# Patient Record
Sex: FEMALE | Race: BLACK OR AFRICAN AMERICAN | NOT HISPANIC OR LATINO | Employment: OTHER | URBAN - METROPOLITAN AREA
[De-identification: names, ages, dates, MRNs, and addresses within clinical notes are randomized per-mention and may not be internally consistent; named-entity substitution may affect disease eponyms.]

---

## 2018-08-23 ENCOUNTER — APPOINTMENT (OUTPATIENT)
Dept: WOMENS IMAGING | Facility: HOSPITAL | Age: 68
End: 2018-08-23

## 2018-08-23 PROCEDURE — 77067 SCR MAMMO BI INCL CAD: CPT | Performed by: RADIOLOGY

## 2019-09-30 ENCOUNTER — APPOINTMENT (OUTPATIENT)
Dept: WOMENS IMAGING | Facility: HOSPITAL | Age: 69
End: 2019-09-30

## 2019-09-30 PROCEDURE — 77067 SCR MAMMO BI INCL CAD: CPT | Performed by: RADIOLOGY

## 2021-07-02 ENCOUNTER — TELEPHONE (OUTPATIENT)
Dept: CARDIOLOGY | Facility: CLINIC | Age: 71
End: 2021-07-02

## 2021-07-09 ENCOUNTER — OFFICE VISIT (OUTPATIENT)
Dept: CARDIOLOGY | Facility: CLINIC | Age: 71
End: 2021-07-09

## 2021-07-09 VITALS
WEIGHT: 287 LBS | HEIGHT: 67 IN | SYSTOLIC BLOOD PRESSURE: 114 MMHG | TEMPERATURE: 97.1 F | BODY MASS INDEX: 45.04 KG/M2 | DIASTOLIC BLOOD PRESSURE: 80 MMHG | HEART RATE: 92 BPM | RESPIRATION RATE: 15 BRPM | OXYGEN SATURATION: 96 %

## 2021-07-09 DIAGNOSIS — I10 ESSENTIAL HYPERTENSION: ICD-10-CM

## 2021-07-09 DIAGNOSIS — I25.10 CORONARY ARTERY DISEASE INVOLVING NATIVE CORONARY ARTERY OF NATIVE HEART WITHOUT ANGINA PECTORIS: ICD-10-CM

## 2021-07-09 DIAGNOSIS — E78.5 HYPERLIPIDEMIA LDL GOAL <70: ICD-10-CM

## 2021-07-09 DIAGNOSIS — I50.32 CHRONIC DIASTOLIC CONGESTIVE HEART FAILURE (HCC): Primary | ICD-10-CM

## 2021-07-09 PROCEDURE — 93000 ELECTROCARDIOGRAM COMPLETE: CPT | Performed by: PHYSICIAN ASSISTANT

## 2021-07-09 PROCEDURE — 99204 OFFICE O/P NEW MOD 45 MIN: CPT | Performed by: PHYSICIAN ASSISTANT

## 2021-07-09 NOTE — PATIENT INSTRUCTIONS
Heart Failure, Diagnosis    Heart failure is a condition in which the heart has trouble pumping blood because it has become weak or stiff. This means that the heart does not pump blood well enough for the body to stay healthy. For some people with heart failure, fluid may back up into the lungs. There may also be swelling (edema) in the lower legs. Heart failure is usually a long-term (chronic) condition. It is important for you to take good care of yourself and follow the treatment plan from your health care provider.  What are the causes?  This condition may be caused by:  · High blood pressure (hypertension). Hypertension causes the heart muscle to work harder than normal. This makes the heart stiff or weak.  · Coronary artery disease, or CAD. CAD is the buildup of cholesterol and fat (plaque) in the arteries of the heart.  · Heart attack, also called myocardial infarction. This injures the heart muscle, making it hard for the heart to pump blood.  · Abnormal heart valves. The valves do not open and close properly, forcing the heart to pump harder to keep the blood flowing.  · Heart muscle disease (cardiomyopathy or myocarditis). This is damage to the heart muscle. It can increase the risk of heart failure.  · Lung disease. The heart works harder when the lungs are not healthy.  · Abnormal heart rhythms. These can lead to heart failure.  What increases the risk?  The risk of heart failure increases as a person ages. This condition is also more likely to develop in people who:  · Are overweight.  · Are male.  · Smoke or chew tobacco.  · Abuse alcohol or illegal drugs.  · Have taken medicines that can damage the heart, such as chemotherapy drugs.  · Have diabetes.  · Have abnormal heart rhythms.  · Have thyroid problems.  · Have low blood counts (anemia).  What are the signs or symptoms?  Symptoms of this condition include:  · Shortness of breath with activity, such as when climbing stairs.  · A cough that does not  go away.  · Swelling of the feet, ankles, legs, or abdomen.  · Losing weight for no reason.  · Trouble breathing when lying flat (orthopnea).  · Waking from sleep because of the need to sit up and get more air.  · Rapid heartbeat.  · Tiredness (fatigue) and loss of energy.  · Feeling light-headed, dizzy, or close to fainting.  · Loss of appetite.  · Nausea.  · Waking up more often during the night to urinate (nocturia).  · Confusion.  How is this diagnosed?  This condition is diagnosed based on:  · Your medical history, symptoms, and a physical exam.  · Diagnostic tests, which may include:  ? Echocardiogram.  ? Electrocardiogram (ECG).  ? Chest X-ray.  ? Blood tests.  ? Exercise stress test.  ? Radionuclide scans.  ? Cardiac catheterization and angiogram.  How is this treated?  Treatment for this condition is aimed at managing the symptoms of heart failure.  Medicines  Treatment may include medicines that:  · Help lower blood pressure by relaxing (dilating) the blood vessels. These medicines are called ACE inhibitors (angiotensin-converting enzyme) and ARBs (angiotensin receptor blockers).  · Cause the kidneys to remove salt and water from the blood through urination (diuretics).  · Improve heart muscle strength and prevent the heart from beating too fast (beta blockers).  · Increase the force of the heartbeat (digoxin).  Healthy behavior changes         Treatment may also include making healthy lifestyle changes, such as:  · Reaching and staying at a healthy weight.  · Quitting smoking or chewing tobacco.  · Eating heart-healthy foods.  · Limiting or avoiding alcohol.  · Stopping the use of illegal drugs.  · Being physically active.    Other treatments  Other treatments may include:  · Procedures to open blocked arteries or repair damaged valves.  · Placing a pacemaker to improve heart function (cardiac resynchronization therapy).  · Placing a device to treat serious abnormal heart rhythms (implantable cardioverter  defibrillator, or ICD).  · Placing a device to improve the pumping ability of the heart (left ventricular assist device, or LVAD).  · Receiving a healthy heart from a donor (heart transplant). This is done when other treatments have not helped.  Follow these instructions at home:  · Manage other health conditions as told by your health care provider. These may include hypertension, diabetes, thyroid disease, or abnormal heart rhythms.  · Get ongoing education and support as needed. Learn as much as you can about heart failure.  · Keep all follow-up visits as told by your health care provider. This is important.  Summary  · Heart failure is a condition in which the heart has trouble pumping blood because it has become weak or stiff.  · This condition is caused by high blood pressure and other diseases of the heart and lungs.  · Symptoms of this condition include shortness of breath, tiredness (fatigue), nausea, and swelling of the feet, ankles, legs, or abdomen.  · Treatments for this condition may include medicines, lifestyle changes, and surgery.  · Manage other health conditions as told by your health care provider.  This information is not intended to replace advice given to you by your health care provider. Make sure you discuss any questions you have with your health care provider.  Document Revised: 03/06/2020 Document Reviewed: 03/06/2020  [employer] Patient Education © 2021 [employer]

## 2021-07-09 NOTE — PROGRESS NOTES
[facility]  [facility] CARDIOLOG[facility]  [address] [place] [address]  Dept: [phone]  Dept Fax: [phone]    Sabina Harkins  1950    Follow Up Office Visit Note    History of Present Illness:  Patient is a 71-year-old female in today to follow-up for coronary artery disease, congestive heart failure, hypertension, and hyperlipidemia.  Patient on Plavix and aspirin.  Previous stents x3.  RCA was stented.  Patient has been having tightness sensation in her chest around her heart.  Patient states that this happens usually at night when she gets a little anxious and goes away quickly.  Patient does not report aggravation or alleviation of symptoms with activity or rest.  Patient states has been coming and going for quite some time now.  Patient had normal stress test last year.  EKG normal today.    Patient last had echocardiogram apparently in 2012 which showed mild diastolic dysfunction ejection fraction 60%.  Patient currently taking Coreg 25 mg twice daily, losartan 100 mg, and torsemide 20 mg daily.  Patient states that she usually only takes 10 mg of torsemide unless she has significant swelling then takes a full 20 mg.  Patient denies any cardiac symptoms today.  Patient reports only mild swelling in her ankles today.    Patient on losartan 100 mg daily for high blood pressure as well.  Patient taking medication as directed without any problems or side effects.  Patient reports blood pressure normal at home around 115/80 on average.    Patient on Lipitor 80 mg daily for hyperlipidemia.    Patient is supposed to have a procedure where she is getting an injection in her spine.  Patient states that the doctor doing this procedure wants her to be off her Plavix for 7 days.      The following portions of the patient's history were reviewed and updated as appropriate: allergies, current medications, past family history, past medical history, past social history, past surgical  history and problem list.    Medications:  allopurinol  aspirin  atorvastatin  carvedilol  clopidogrel  famotidine  loratadine  losartan  nitroglycerin  oxybutynin XL  pantoprazole  polyethylene glycol  potassium chloride tablet controlled-release  pregabalin  sertraline  SM Vitamin D3 capsule  torsemide  traMADol    Subjective  Allergies   Allergen Reactions   • Gabapentin Hallucinations   • Codeine Unknown - High Severity   • Nabumetone Unknown - High Severity        Past Medical History:   Diagnosis Date   • Allergic rhinitis    • Bladder disorder    • Body mass index (BMI) of 40.0 to 44.9 in adult (CMS/[facility])    • CAD (coronary artery disease)    • Chest pain    • Chronic bilateral low back pain with sciatica    • Chronic diastolic heart failure (CMS/[facility])    • Chronic systolic (congestive) heart failure (CMS/[facility])    • Complex dyslipidemia    • Coronary artery disease involving native coronary artery of native heart without angina pectoris    • Depressive disorder    • Diastolic heart failure, stage B (CMS/[facility])    • Dyspnea    • Essential hypertension    • Gait abnormality    • GERD without esophagitis    • Gout    • Heart disease    • History of colonic polyps    • Hyperglycemia    • Hypokalemia    • Idiopathic 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 tophus    • Increased frequency of urination    • Knee pain    • LAD (linear IgA dermatosis)    • Left ventricular diastolic dysfunction    • Long term (current) use of opiate analgesic    • Low back pain at multiple sites    • Lumbar radiculitis    • Mixed hyperlipidemia    • Morbid obesity (CMS/[facility])    • Multiple vessel coronary artery disease    • Myocardial infarction (CMS/[facility])    • Osteoarthritis of knee    • Other chronic pain    • Overactive bladder    • Pure hypercholesterolemia    • Seasonal allergies    • SOB (shortness of breath)    • Vitamin B12 deficiency    • Vitamin D deficiency        Past Surgical History:   Procedure Laterality Date   • CARDIAC  "CATHETERIZATION     • COLONOSCOPY     • CORONARY STENT PLACEMENT     • KNEE SURGERY      ARTHROSCOPIC SURGERY RIGHT KNEE   • LUMBAR FUSION     • OTHER SURGICAL HISTORY      RESECTION OF SWEAT GLAND AND ACELOUS SKIN   • TUBAL ABDOMINAL LIGATION Bilateral        Family History   Problem Relation Age of Onset   • Colon cancer Mother    • Stroke Father    • Hypertension Sister    • Cardiomyopathy Brother    • Stroke Other    • Diabetes type II Daughter    • Diabetes type II Son         Social History     Socioeconomic History   • Marital status: Unknown     Spouse name: Not on file   • Number of children: Not on file   • Years of education: Not on file   • Highest education level: Not on file   Tobacco Use   • Smoking status: Never Smoker   • Smokeless tobacco: Never Used   Substance and Sexual Activity   • Alcohol use: Yes   • Drug use: Never   • Sexual activity: Defer       Review of Systems   Constitutional: Negative for activity change, chills and fever.   Respiratory: Negative for cough and shortness of breath.    Cardiovascular: Positive for leg swelling. Negative for chest pain and palpitations.   Gastrointestinal: Negative for diarrhea, nausea and vomiting.   Endocrine: Negative for cold intolerance and heat intolerance.   Musculoskeletal: Negative for arthralgias and myalgias.   Skin: Negative for rash and wound.   Neurological: Negative for dizziness, light-headedness and headaches.       Objective  Vitals:    07/09/21 1307   BP: 114/80   BP Location: Left arm   Patient Position: Sitting   Cuff Size: Large Adult   Pulse: 92   Resp: 15   Temp: 97.1 °F (36.2 °C)   TempSrc: Infrared   SpO2: 96%   Weight: 130 kg (287 lb)   Height: 170.2 cm (67\")   PainSc:   8     Body mass index is 44.95 kg/m².     Physical Exam  Vitals reviewed.   Constitutional:       Appearance: Healthy appearance. Not in distress.   Neck:      Vascular: No JVR. JVD normal.   Pulmonary:      Effort: Pulmonary effort is normal.      Breath " sounds: Normal breath sounds. No wheezing. No rhonchi. No rales.   Chest:      Chest wall: Not tender to palpatation.   Cardiovascular:      PMI at left midclavicular line. Normal rate. Regular rhythm. Normal S1. Normal S2.      Murmurs: There is no murmur.      No gallop. No click. No rub.   Pulses:     Intact distal pulses.   Edema:     Peripheral edema present.     Ankle: bilateral trace pitting edema of the ankle.  Abdominal:      General: Bowel sounds are normal.      Palpations: Abdomen is soft.      Tenderness: There is no abdominal tenderness.   Musculoskeletal: Normal range of motion.         General: No tenderness. Skin:     General: Skin is warm and dry.   Neurological:      General: No focal deficit present.      Mental Status: Alert and oriented to person, place and time.          Diagnostic Data    ECG 12 Lead    Date/Time: 7/9/2021 1:52 PM  Performed by: Rojelio Lazo PA-C  Authorized by: Rojelio Lazo PA-C   Rhythm: sinus rhythm  Rate: normal  QRS axis: normal    Clinical impression: normal ECG            Assessment  Diagnoses and all orders for this visit:    1. Chronic diastolic congestive heart failure (CMS/HCC) (Primary)  -     Adult Transthoracic Echo Complete W/ Cont if Necessary Per Protocol; Future    2. Coronary artery disease involving native coronary artery of native heart without angina pectoris    3. Essential hypertension    4. Hyperlipidemia LDL goal <70        Plan    1. Chronic diastolic congestive heart failure (CMS/HCC) (Primary)- seems overall doing well, having some leg swelling.  Currently on Coreg 25 mg twice daily, losartan 100 mg daily, and torsemide 10 to 20 mg daily as needed.  Will repeat echocardiogram.  Advised on the use of compression stockings and limiting salt intake.    2. Coronary artery disease involving native coronary artery of native heart without angina pectoris- overall seems to be doing well.  Patient has had some chest tightness, likely  not related to heart but more anxiety.  Normal stress test last year.  Normal EKG today.  On Plavix and aspirin.  Okay to stop Plavix for 7 days prior to spinal injection procedure.    3. Essential hypertension- well-controlled on losartan 100 mg daily, continue treatment, continue to monitor.    4. Hyperlipidemia LDL goal <70- on Lipitor 80 mg at bedtime.  Recent lipids from April were normal.  Repeat lipids next time.      Return in about 4 weeks (around 8/6/2021) for echo reults.    Rojelio Lazo PA-C  07/09/2021

## 2021-08-02 ENCOUNTER — TELEPHONE (OUTPATIENT)
Dept: CARDIOLOGY | Facility: CLINIC | Age: 71
End: 2021-08-02

## 2021-08-03 NOTE — TELEPHONE ENCOUNTER
Echo normal Ef., heart is thick, and mildly stiffness     Spoke with pt she understand what is going on . She wants us to send a copy to pcp

## 2021-08-18 ENCOUNTER — TELEPHONE (OUTPATIENT)
Dept: CARDIOLOGY | Facility: CLINIC | Age: 71
End: 2021-08-18

## 2021-08-18 NOTE — TELEPHONE ENCOUNTER
Patient is having an epidural steroid injection with Dr Butsillos needing to hold the plavix 7 days before 24 hours after and hold aspirin 25 hours before and after injection. Please fax clearance letter [phone] call [phone] with any questions.

## 2022-01-26 ENCOUNTER — OFFICE VISIT (OUTPATIENT)
Dept: CARDIOLOGY | Facility: CLINIC | Age: 72
End: 2022-01-26

## 2022-01-26 VITALS
BODY MASS INDEX: 44.48 KG/M2 | WEIGHT: 283.4 LBS | RESPIRATION RATE: 18 BRPM | SYSTOLIC BLOOD PRESSURE: 130 MMHG | TEMPERATURE: 97.5 F | HEIGHT: 67 IN | HEART RATE: 90 BPM | DIASTOLIC BLOOD PRESSURE: 88 MMHG | OXYGEN SATURATION: 99 %

## 2022-01-26 DIAGNOSIS — I25.10 CORONARY ARTERY DISEASE INVOLVING NATIVE CORONARY ARTERY OF NATIVE HEART WITHOUT ANGINA PECTORIS: Primary | ICD-10-CM

## 2022-01-26 DIAGNOSIS — I50.32 CHRONIC DIASTOLIC CONGESTIVE HEART FAILURE: ICD-10-CM

## 2022-01-26 DIAGNOSIS — E78.5 HYPERLIPIDEMIA LDL GOAL <70: ICD-10-CM

## 2022-01-26 DIAGNOSIS — I10 ESSENTIAL HYPERTENSION: ICD-10-CM

## 2022-01-26 PROCEDURE — 93000 ELECTROCARDIOGRAM COMPLETE: CPT | Performed by: INTERNAL MEDICINE

## 2022-01-26 PROCEDURE — 99214 OFFICE O/P EST MOD 30 MIN: CPT | Performed by: INTERNAL MEDICINE

## 2022-01-26 NOTE — PROGRESS NOTES
[facility]  [facility] CARDIOLOGY  [address] [place] [address]  Dept: [phone]  Dept Fax: [phone]    Sabina Harkins  1950    Follow Up Office Visit Note    History of Present Illness:  Sabina Harkins is a 71 y.o. female who presents to the clinic for CAD- She has had chest discomfort for the last 2 weeks , on and off  At resting or walking last 10 minutes, no SOB, no sweating, ., her Bp is 120.80, her cardiac exam seems normal, she did have a stress nuclear in 2020 normal, normal Ef on ASA, Plavix, Coreg 25 bid,  ,EKG sinus with no changes Old inferior wall MI    The following portions of the patient's history were reviewed and updated as appropriate: allergies, current medications, past family history, past medical history, past social history, past surgical history and problem list.    Medications:  allopurinol  aspirin  atorvastatin  carvedilol  clopidogrel  famotidine  loratadine  losartan  nitroglycerin  oxybutynin XL  pantoprazole  polyethylene glycol  potassium chloride tablet controlled-release  pregabalin  sertraline  SM Vitamin D3 capsule  torsemide  traMADol    Subjective  Allergies   Allergen Reactions   • Gabapentin Hallucinations   • Codeine Unknown - High Severity     Pt had surgery and doctor believes she might be allergic to it. Patient is not sure what it does.   • Nabumetone Unknown - High Severity        Past Medical History:   Diagnosis Date   • Allergic rhinitis    • Bladder disorder    • Body mass index (BMI) of 40.0 to 44.9 in adult ([facility])    • CAD (coronary artery disease)    • Chest pain    • Chronic bilateral low back pain with sciatica    • Chronic diastolic heart failure (HCC)    • Chronic systolic (congestive) heart failure ([facility])    • Complex dyslipidemia    • Coronary artery disease involving native coronary artery of native heart without angina pectoris    • Depressive disorder    • Diastolic heart failure, stage B ([facility])    • Dyspnea    •  Essential hypertension    • Gait abnormality    • GERD without esophagitis    • Gout    • Heart disease    • History of colonic polyps    • Hyperglycemia    • Hypokalemia    • Idiopathic 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 tophus    • Increased frequency of urination    • Knee pain    • LAD (linear IgA dermatosis)    • Left ventricular diastolic dysfunction    • Long term (current) use of opiate analgesic    • Low back pain at multiple sites    • Lumbar radiculitis    • Mixed hyperlipidemia    • Morbid obesity (HCC)    • Multiple vessel coronary artery disease    • Myocardial infarction (HCC)    • Osteoarthritis of knee    • Other chronic pain    • Overactive bladder    • Pure hypercholesterolemia    • Seasonal allergies    • SOB (shortness of breath)    • Vitamin B12 deficiency    • Vitamin D deficiency        Past Surgical History:   Procedure Laterality Date   • CARDIAC CATHETERIZATION     • COLONOSCOPY     • CORONARY STENT PLACEMENT     • KNEE SURGERY      ARTHROSCOPIC SURGERY RIGHT KNEE   • LUMBAR FUSION     • OTHER SURGICAL HISTORY      RESECTION OF SWEAT GLAND AND ACELOUS SKIN   • TUBAL ABDOMINAL LIGATION Bilateral        Family History   Problem Relation Age of Onset   • Colon cancer Mother    • Stroke Father    • Hypertension Sister    • Cardiomyopathy Brother    • Stroke Other    • Diabetes type II Daughter    • Diabetes type II Son         Social History     Socioeconomic History   • Marital status: Unknown   Tobacco Use   • Smoking status: Never Smoker   • Smokeless tobacco: Never Used   Substance and Sexual Activity   • Alcohol use: Yes   • Drug use: Never   • Sexual activity: Defer       Review of Systems   Constitutional: Negative.    HENT: Negative.    Respiratory: Positive for shortness of breath.    Cardiovascular: Positive for chest pain.   Endocrine: Negative.    Genitourinary: Negative.    Musculoskeletal: Negative.    Skin: Negative.    Allergic/Immunologic: Negative.    Neurological:  "Negative.    Hematological: Negative.    Psychiatric/Behavioral: Negative.    All other systems reviewed and are negative.      Cardiovascular Procedures    ECHO/MUGA:   STRESS TESTS:   CARDIAC CATH:   DEVICES:   HOLTER:   CT/MRI:   VASCULAR:   CARDIOTHORACIC:     Objective  Vitals:    01/26/22 0907   BP: 130/88   BP Location: Right arm   Patient Position: Lying   Cuff Size: Adult   Pulse: 90   Resp: 18   Temp: 97.5 °F (36.4 °C)   TempSrc: Infrared   SpO2: 99%   Weight: 129 kg (283 lb 6.4 oz)   Height: 170.2 cm (67\")   PainSc: 0-No pain     Body mass index is 44.39 kg/m².     Physical Exam  Constitutional:       Appearance: Healthy appearance. Not in distress.   Neck:      Vascular: No JVR. JVD normal.   Pulmonary:      Effort: Pulmonary effort is normal.      Breath sounds: Normal breath sounds. No wheezing. No rhonchi. No rales.   Chest:      Chest wall: Not tender to palpatation.   Cardiovascular:      PMI at left midclavicular line. Normal rate. Regular rhythm. Normal S1. Normal S2.      Murmurs: There is no murmur.      No gallop. No click. No rub.   Pulses:     Intact distal pulses.   Edema:     Peripheral edema absent.   Abdominal:      General: Bowel sounds are normal.      Palpations: Abdomen is soft.      Tenderness: There is no abdominal tenderness.   Musculoskeletal: Normal range of motion.         General: No tenderness. Skin:     General: Skin is warm and dry.   Neurological:      General: No focal deficit present.      Mental Status: Alert and oriented to person, place and time.          Diagnostic Data    ECG 12 Lead    Date/Time: 1/26/2022 9:36 AM  Performed by: Mehul Stephens MD  Authorized by: Mehul Stephens MD   Comparison: compared with previous ECG from 7/9/2021  Similar to previous ECG  Rhythm: sinus rhythm  Rate: normal  BPM: 83  QRS axis: normal    Clinical impression: abnormal EKG and myocardial infarction            Assessment and Plan  Diagnoses and all orders for this " visit:    Coronary artery disease involving native coronary artery of native heart without angina pectoris- Chest discomfort seems atypical, stress nuclear 2020.  Normal, EKgh no changes will observe, seems more often at resting, will watch     Chronic diastolic congestive heart failure (HCC)- Has no major edema, some SOB on exertion, on Torsemide 20 mg daily,     Essential hypertension- The BP is 125.80 on Losartan 100 mg, Coreg 25 bid     Hyperlipidemia LDL goal <70- On Lipitor 80 mg , will get lab today         No follow-ups on file.    Mehul Stephens MD  01/26/2022

## 2022-01-27 LAB
ALBUMIN SERPL-MCNC: 4 G/DL (ref 3.8–4.8)
ALBUMIN/GLOB SERPL: 1.6 {RATIO} (ref 1.2–2.2)
ALP SERPL-CCNC: 150 IU/L (ref 44–121)
ALT SERPL-CCNC: 17 IU/L (ref 0–32)
AST SERPL-CCNC: 19 IU/L (ref 0–40)
BASOPHILS # BLD AUTO: 0.1 X10E3/UL (ref 0–0.2)
BASOPHILS NFR BLD AUTO: 1 %
BILIRUB SERPL-MCNC: 0.7 MG/DL (ref 0–1.2)
BUN SERPL-MCNC: 16 MG/DL (ref 8–27)
BUN/CREAT SERPL: 14 (ref 12–28)
CALCIUM SERPL-MCNC: 9.3 MG/DL (ref 8.7–10.3)
CHLORIDE SERPL-SCNC: 108 MMOL/L (ref 96–106)
CHOLEST SERPL-MCNC: NORMAL MG/DL
CO2 SERPL-SCNC: 23 MMOL/L (ref 20–29)
CREAT SERPL-MCNC: 1.16 MG/DL (ref 0.57–1)
EOSINOPHIL # BLD AUTO: 0.1 X10E3/UL (ref 0–0.4)
EOSINOPHIL NFR BLD AUTO: 1 %
ERYTHROCYTE [DISTWIDTH] IN BLOOD BY AUTOMATED COUNT: 12.8 % (ref 11.7–15.4)
GLOBULIN SER CALC-MCNC: 2.5 G/DL (ref 1.5–4.5)
GLUCOSE SERPL-MCNC: 95 MG/DL (ref 65–99)
HCT VFR BLD AUTO: 46.8 % (ref 34–46.6)
HDLC SERPL-MCNC: NORMAL MG/DL
HGB BLD-MCNC: 15.9 G/DL (ref 11.1–15.9)
IMM GRANULOCYTES # BLD AUTO: 0.1 X10E3/UL (ref 0–0.1)
IMM GRANULOCYTES NFR BLD AUTO: 1 %
LYMPHOCYTES # BLD AUTO: 2.6 X10E3/UL (ref 0.7–3.1)
LYMPHOCYTES NFR BLD AUTO: 25 %
MCH RBC QN AUTO: 31 PG (ref 26.6–33)
MCHC RBC AUTO-ENTMCNC: 34 G/DL (ref 31.5–35.7)
MCV RBC AUTO: 91 FL (ref 79–97)
MONOCYTES # BLD AUTO: 1 X10E3/UL (ref 0.1–0.9)
MONOCYTES NFR BLD AUTO: 9 %
NEUTROPHILS # BLD AUTO: 6.7 X10E3/UL (ref 1.4–7)
NEUTROPHILS NFR BLD AUTO: 63 %
PLATELET # BLD AUTO: 264 X10E3/UL (ref 150–450)
POTASSIUM SERPL-SCNC: 3.9 MMOL/L (ref 3.5–5.2)
PROT SERPL-MCNC: 6.5 G/DL (ref 6–8.5)
RBC # BLD AUTO: 5.13 X10E6/UL (ref 3.77–5.28)
SODIUM SERPL-SCNC: 145 MMOL/L (ref 134–144)
SPECIMEN STATUS: NORMAL
TRIGL SERPL-MCNC: NORMAL MG/DL
VLDLC SERPL CALC-MCNC: NORMAL MG/DL
WBC # BLD AUTO: 10.5 X10E3/UL (ref 3.4–10.8)

## 2022-01-28 ENCOUNTER — TELEPHONE (OUTPATIENT)
Dept: CARDIOLOGY | Facility: CLINIC | Age: 72
End: 2022-01-28

## 2022-01-28 ENCOUNTER — LAB (OUTPATIENT)
Dept: CARDIOLOGY | Facility: CLINIC | Age: 72
End: 2022-01-28

## 2022-01-28 RX ORDER — EZETIMIBE 10 MG/1
10 TABLET ORAL DAILY
COMMUNITY
End: 2022-01-28 | Stop reason: SDUPTHER

## 2022-01-28 RX ORDER — EZETIMIBE 10 MG/1
10 TABLET ORAL DAILY
Qty: 90 TABLET | Refills: 3 | Status: SHIPPED | OUTPATIENT
Start: 2022-01-28 | End: 2023-01-04 | Stop reason: SDUPTHER

## 2022-01-28 NOTE — TELEPHONE ENCOUNTER
Spoke with Georgette, Ms. Harkins's daughter, and advised her of the lab results and advised I would send in a prescription for the Zetia. Daughter verbalized understanding.

## 2022-01-28 NOTE — TELEPHONE ENCOUNTER
----- Message from Mehul Stephens MD sent at 1/27/2022  8:46 AM EST -----  Lab are good but LDL is high 91,. We need to be under 70, will add Zetia, please keep taking the Atorvastatin

## 2022-01-29 LAB
ALBUMIN SERPL-MCNC: 4.1 G/DL (ref 3.8–4.8)
ALBUMIN/GLOB SERPL: 1.8 {RATIO} (ref 1.2–2.2)
ALP SERPL-CCNC: 152 IU/L (ref 44–121)
ALT SERPL-CCNC: 20 IU/L (ref 0–32)
AST SERPL-CCNC: 20 IU/L (ref 0–40)
BILIRUB SERPL-MCNC: 0.6 MG/DL (ref 0–1.2)
BUN SERPL-MCNC: 14 MG/DL (ref 8–27)
BUN/CREAT SERPL: 13 (ref 12–28)
CALCIUM SERPL-MCNC: 9.2 MG/DL (ref 8.7–10.3)
CHLORIDE SERPL-SCNC: 111 MMOL/L (ref 96–106)
CHOLEST SERPL-MCNC: 160 MG/DL (ref 100–199)
CO2 SERPL-SCNC: 21 MMOL/L (ref 20–29)
CREAT SERPL-MCNC: 1.06 MG/DL (ref 0.57–1)
GLOBULIN SER CALC-MCNC: 2.3 G/DL (ref 1.5–4.5)
GLUCOSE SERPL-MCNC: 96 MG/DL (ref 65–99)
HDLC SERPL-MCNC: 60 MG/DL
LDLC SERPL CALC-MCNC: 83 MG/DL (ref 0–99)
POTASSIUM SERPL-SCNC: 4.1 MMOL/L (ref 3.5–5.2)
PROT SERPL-MCNC: 6.4 G/DL (ref 6–8.5)
SODIUM SERPL-SCNC: 148 MMOL/L (ref 134–144)
TRIGL SERPL-MCNC: 90 MG/DL (ref 0–149)
VLDLC SERPL CALC-MCNC: 17 MG/DL (ref 5–40)

## 2022-02-25 ENCOUNTER — TELEPHONE (OUTPATIENT)
Dept: CARDIOLOGY | Facility: CLINIC | Age: 72
End: 2022-02-25

## 2022-02-25 NOTE — TELEPHONE ENCOUNTER
[facility] pain clinic was needing a clearance on file for an upcoming procedure and needing to hold patients plavix for 7 days.  Spoke with Ms. Harkins and she advised she is feeling good, no chest pain, soa, edema.  A clearance letter sent to [facility].

## 2022-04-13 ENCOUNTER — OFFICE VISIT (OUTPATIENT)
Dept: CARDIOLOGY | Facility: CLINIC | Age: 72
End: 2022-04-13

## 2022-04-13 VITALS
RESPIRATION RATE: 18 BRPM | SYSTOLIC BLOOD PRESSURE: 136 MMHG | OXYGEN SATURATION: 96 % | TEMPERATURE: 98 F | HEART RATE: 84 BPM | DIASTOLIC BLOOD PRESSURE: 82 MMHG | WEIGHT: 279 LBS | BODY MASS INDEX: 43.79 KG/M2 | HEIGHT: 67 IN

## 2022-04-13 DIAGNOSIS — I50.32 CHRONIC DIASTOLIC CONGESTIVE HEART FAILURE: ICD-10-CM

## 2022-04-13 DIAGNOSIS — I10 ESSENTIAL HYPERTENSION: ICD-10-CM

## 2022-04-13 DIAGNOSIS — E78.5 HYPERLIPIDEMIA LDL GOAL <70: ICD-10-CM

## 2022-04-13 DIAGNOSIS — I25.10 CORONARY ARTERY DISEASE INVOLVING NATIVE CORONARY ARTERY OF NATIVE HEART WITHOUT ANGINA PECTORIS: Primary | ICD-10-CM

## 2022-04-13 DIAGNOSIS — E66.01 MORBID OBESITY WITH BMI OF 40.0-44.9, ADULT: ICD-10-CM

## 2022-04-13 PROCEDURE — 99214 OFFICE O/P EST MOD 30 MIN: CPT | Performed by: INTERNAL MEDICINE

## 2022-04-13 NOTE — PROGRESS NOTES
[facility] [place]  [facility] CARDIOLOGY  [address] [place] [address]  Dept: [phone]  Dept Fax: [phone]    Sabina Harkins  1950    Follow Up Office Visit Note    History of Present Illness:  Sabina Harkins is a 71 y.o. female who presents to the clinic for Follow-up. CAD- She feels weaker, more SOB, no edema, on ASA, Plavix. Plus Coreg 25 bid.,denies any chest pain, she has prior stent in 2006 to RCA and in 2011 to CX, , she has another cath in 2015 with 100% LAD , RCA 50% and 50% CX, , will get a stress nuclear. On Asa, Plavix and Coreg 25 bid     The following portions of the patient's history were reviewed and updated as appropriate: allergies, current medications, past family history, past medical history, past social history, past surgical history and problem list.    Medications:  allopurinol  aspirin  atorvastatin  carvedilol  clopidogrel  ezetimibe  famotidine  loratadine  losartan  nitroglycerin  oxybutynin XL  pantoprazole  polyethylene glycol  potassium chloride tablet controlled-release  pregabalin  sertraline  SM Vitamin D3 capsule  torsemide  traMADol    Subjective  Allergies   Allergen Reactions   • Codeine Unknown - High Severity     Pt had surgery and doctor believes she might be allergic to it. Patient is not sure what it does.   • Nabumetone Unknown - High Severity   • Gabapentin Hallucinations        Past Medical History:   Diagnosis Date   • Allergic rhinitis    • Bladder disorder    • Body mass index (BMI) of 40.0 to 44.9 in adult (HCC)    • CAD (coronary artery disease)    • Chest pain    • Chronic bilateral low back pain with sciatica    • Chronic diastolic heart failure (HCC)    • Chronic systolic (congestive) heart failure (HCC)    • Complex dyslipidemia    • Coronary artery disease involving native coronary artery of native heart without angina pectoris    • Depressive disorder    • Diastolic heart failure, stage B ([facility])    • Dyspnea    • Essential  hypertension    • Gait abnormality    • GERD without esophagitis    • Gout    • Heart disease    • History of colonic polyps    • Hyperglycemia    • Hypokalemia    • Idiopathic 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 tophus    • Increased frequency of urination    • Knee pain    • LAD (linear IgA dermatosis)    • Left ventricular diastolic dysfunction    • Long term (current) use of opiate analgesic    • Low back pain at multiple sites    • Lumbar radiculitis    • Mixed hyperlipidemia    • Morbid obesity (HCC)    • Multiple vessel coronary artery disease    • Myocardial infarction (HCC)    • Osteoarthritis of knee    • Other chronic pain    • Overactive bladder    • Pure hypercholesterolemia    • Seasonal allergies    • SOB (shortness of breath)    • Vitamin B12 deficiency    • Vitamin D deficiency        Past Surgical History:   Procedure Laterality Date   • CARDIAC CATHETERIZATION     • COLONOSCOPY     • CORONARY STENT PLACEMENT     • KNEE SURGERY      ARTHROSCOPIC SURGERY RIGHT KNEE   • LUMBAR FUSION     • OTHER SURGICAL HISTORY      RESECTION OF SWEAT GLAND AND ACELOUS SKIN   • TUBAL ABDOMINAL LIGATION Bilateral        Family History   Problem Relation Age of Onset   • Colon cancer Mother    • Stroke Father    • Hypertension Sister    • Cardiomyopathy Brother    • Stroke Other    • Diabetes type II Daughter    • Diabetes type II Son         Social History     Socioeconomic History   • Marital status: Unknown   Tobacco Use   • Smoking status: Never Smoker   • Smokeless tobacco: Never Used   Vaping Use   • Vaping Use: Never used   Substance and Sexual Activity   • Alcohol use: Yes   • Drug use: Never   • Sexual activity: Defer       Review of Systems   Constitutional: Positive for fatigue.   HENT: Negative.    Respiratory: Positive for shortness of breath.    Cardiovascular: Positive for leg swelling.   Endocrine: Negative.    Genitourinary: Negative.    Musculoskeletal: Negative.    Skin: Negative.   "  Allergic/Immunologic: Negative.    Neurological: Negative.    Hematological: Negative.    Psychiatric/Behavioral: Negative.        Cardiovascular Procedures    ECHO/MUGA:   STRESS TESTS:   CARDIAC CATH:   DEVICES:   HOLTER:   CT/MRI:   VASCULAR:   CARDIOTHORACIC:     Objective  Vitals:    04/13/22 1023   BP: 136/82   BP Location: Left arm   Patient Position: Lying   Cuff Size: Adult   Pulse: 84   Resp: 18   Temp: 98 °F (36.7 °C)   TempSrc: Infrared   SpO2: 96%   Weight: 127 kg (279 lb)   Height: 170.2 cm (67\")   PainSc: 0-No pain     Body mass index is 43.7 kg/m².     Physical Exam  Constitutional:       Appearance: Healthy appearance. Not in distress.   Neck:      Vascular: No JVR. JVD normal.   Pulmonary:      Effort: Pulmonary effort is normal.      Breath sounds: Normal breath sounds. No wheezing. No rhonchi. No rales.   Chest:      Chest wall: Not tender to palpatation.   Cardiovascular:      PMI at left midclavicular line. Normal rate. Regular rhythm. Normal S1. Normal S2.      Murmurs: There is no murmur.      No gallop. No click. No rub.   Pulses:     Intact distal pulses.   Edema:     Peripheral edema absent.   Abdominal:      General: Bowel sounds are normal.      Palpations: Abdomen is soft.      Tenderness: There is no abdominal tenderness.   Musculoskeletal: Normal range of motion.         General: No tenderness. Skin:     General: Skin is warm and dry.   Neurological:      General: No focal deficit present.      Mental Status: Alert and oriented to person, place and time.          Diagnostic Data  Procedures    Assessment and Plan  Diagnoses and all orders for this visit:    Coronary artery disease involving native coronary artery of native heart without angina pectoris- No chest pain, on ASA, Plavix, and Coreg.as she feels more SOB and weak, will get lab and also a stress nuclear   -     Stress Test With Myocardial Perfusion Two Day; Future  -     High Sensitivity CRP  -     CBC & Differential  -    "  Comprehensive Metabolic Panel    Chronic diastolic congestive heart failure (HCC)- Has mild edema, but feels more SOB on Torsemide 20 mg, increase from 10 to 20 but not better, will get a BNP  -     Comprehensive Metabolic Panel  -     proBNP  -     TSH    Essential hypertension- The BP is 110.60 on Coreg 25 bid and Losartan 100 mg, Plus Torsemide 20 mg    Hyperlipidemia LDL goal <70- On Lipitor 80 mg and Zetia   -     High Sensitivity CRP  -     Lipid Panel  -     CK    Morbid obesity with BMI of 40.0-44.9, adult ([facility])         No follow-ups on file.    Mehul Stephens MD  04/13/2022

## 2022-04-14 LAB
ALBUMIN SERPL-MCNC: 4.3 G/DL (ref 3.8–4.8)
ALBUMIN/GLOB SERPL: 1.5 {RATIO} (ref 1.2–2.2)
ALP SERPL-CCNC: 172 IU/L (ref 44–121)
ALT SERPL-CCNC: 15 IU/L (ref 0–32)
AST SERPL-CCNC: 25 IU/L (ref 0–40)
BASOPHILS # BLD AUTO: 0.1 X10E3/UL (ref 0–0.2)
BASOPHILS NFR BLD AUTO: 1 %
BILIRUB SERPL-MCNC: 0.9 MG/DL (ref 0–1.2)
BUN SERPL-MCNC: 24 MG/DL (ref 8–27)
BUN/CREAT SERPL: 16 (ref 12–28)
CALCIUM SERPL-MCNC: 9.4 MG/DL (ref 8.7–10.3)
CHLORIDE SERPL-SCNC: 103 MMOL/L (ref 96–106)
CHOLEST SERPL-MCNC: 150 MG/DL (ref 100–199)
CK SERPL-CCNC: 112 U/L (ref 32–182)
CO2 SERPL-SCNC: 18 MMOL/L (ref 20–29)
CREAT SERPL-MCNC: 1.46 MG/DL (ref 0.57–1)
CRP SERPL HS-MCNC: 1.04 MG/L (ref 0–3)
EGFRCR SERPLBLD CKD-EPI 2021: 39 ML/MIN/1.73
EOSINOPHIL # BLD AUTO: 0.1 X10E3/UL (ref 0–0.4)
EOSINOPHIL NFR BLD AUTO: 1 %
ERYTHROCYTE [DISTWIDTH] IN BLOOD BY AUTOMATED COUNT: 13 % (ref 11.7–15.4)
GLOBULIN SER CALC-MCNC: 2.8 G/DL (ref 1.5–4.5)
GLUCOSE SERPL-MCNC: 95 MG/DL (ref 65–99)
HCT VFR BLD AUTO: 52.4 % (ref 34–46.6)
HDLC SERPL-MCNC: 48 MG/DL
HGB BLD-MCNC: 16.9 G/DL (ref 11.1–15.9)
IMM GRANULOCYTES # BLD AUTO: 0.1 X10E3/UL (ref 0–0.1)
IMM GRANULOCYTES NFR BLD AUTO: 1 %
LDLC SERPL CALC-MCNC: 76 MG/DL (ref 0–99)
LYMPHOCYTES # BLD AUTO: 2.1 X10E3/UL (ref 0.7–3.1)
LYMPHOCYTES NFR BLD AUTO: 22 %
MCH RBC QN AUTO: 29.8 PG (ref 26.6–33)
MCHC RBC AUTO-ENTMCNC: 32.3 G/DL (ref 31.5–35.7)
MCV RBC AUTO: 92 FL (ref 79–97)
MONOCYTES # BLD AUTO: 0.8 X10E3/UL (ref 0.1–0.9)
MONOCYTES NFR BLD AUTO: 9 %
NEUTROPHILS # BLD AUTO: 6.2 X10E3/UL (ref 1.4–7)
NEUTROPHILS NFR BLD AUTO: 66 %
NT-PROBNP SERPL-MCNC: 40 PG/ML (ref 0–301)
PLATELET # BLD AUTO: 268 X10E3/UL (ref 150–450)
POTASSIUM SERPL-SCNC: 3.9 MMOL/L (ref 3.5–5.2)
PROT SERPL-MCNC: 7.1 G/DL (ref 6–8.5)
RBC # BLD AUTO: 5.68 X10E6/UL (ref 3.77–5.28)
SODIUM SERPL-SCNC: 145 MMOL/L (ref 134–144)
TRIGL SERPL-MCNC: 150 MG/DL (ref 0–149)
TSH SERPL DL<=0.005 MIU/L-ACNC: 1.1 UIU/ML (ref 0.45–4.5)
VLDLC SERPL CALC-MCNC: 26 MG/DL (ref 5–40)
WBC # BLD AUTO: 9.3 X10E3/UL (ref 3.4–10.8)

## 2022-04-15 ENCOUNTER — TELEPHONE (OUTPATIENT)
Dept: CARDIOLOGY | Facility: CLINIC | Age: 72
End: 2022-04-15

## 2022-04-15 NOTE — TELEPHONE ENCOUNTER
"Spoke with Sabina and advised her that overall her lab work looked really good, including her lipids. However her kidney function was mildly elevated and Dr. Stephens would like you to alternated your torsemide 20mg one day than 10mg the next day. Pt repeated the instructions and verbalized understanding.  \"Do I come back to see him or have any more lab work before my October appointment\"?  I advised I would check with Dr. Stephens and let her know on Monday when she comes for her stress test and ECHO.   "

## 2022-04-15 NOTE — TELEPHONE ENCOUNTER
----- Message from Mehul Stephens MD sent at 4/14/2022  3:59 PM EDT -----  Lipids are near optimal, will talk about that next visit but  the creatinine again is 1,4, ask her to change torsemide to 20 alternating with 10

## 2022-04-28 ENCOUNTER — TELEPHONE (OUTPATIENT)
Dept: CARDIOLOGY | Facility: CLINIC | Age: 72
End: 2022-04-28

## 2022-04-28 NOTE — TELEPHONE ENCOUNTER
Called Ms. Harkins and explained that Dr. Stephens wanted me to get her scheduled to go over her Stress Test results.  Please call [phone] to schedule. Thanks.

## 2022-05-02 ENCOUNTER — OFFICE VISIT (OUTPATIENT)
Dept: CARDIOLOGY | Facility: CLINIC | Age: 72
End: 2022-05-02

## 2022-05-02 VITALS
OXYGEN SATURATION: 97 % | RESPIRATION RATE: 22 BRPM | SYSTOLIC BLOOD PRESSURE: 160 MMHG | HEIGHT: 67 IN | BODY MASS INDEX: 44.73 KG/M2 | TEMPERATURE: 96.6 F | WEIGHT: 285 LBS | HEART RATE: 113 BPM | DIASTOLIC BLOOD PRESSURE: 108 MMHG

## 2022-05-02 DIAGNOSIS — E78.5 HYPERLIPIDEMIA LDL GOAL <70: ICD-10-CM

## 2022-05-02 DIAGNOSIS — E66.01 MORBID OBESITY WITH BMI OF 40.0-44.9, ADULT: ICD-10-CM

## 2022-05-02 DIAGNOSIS — I50.32 CHRONIC DIASTOLIC CONGESTIVE HEART FAILURE: ICD-10-CM

## 2022-05-02 DIAGNOSIS — I10 ESSENTIAL HYPERTENSION: ICD-10-CM

## 2022-05-02 DIAGNOSIS — I25.10 CORONARY ARTERY DISEASE INVOLVING NATIVE CORONARY ARTERY OF NATIVE HEART WITHOUT ANGINA PECTORIS: Primary | ICD-10-CM

## 2022-05-02 DIAGNOSIS — G47.33 OSA (OBSTRUCTIVE SLEEP APNEA): ICD-10-CM

## 2022-05-02 PROCEDURE — 99214 OFFICE O/P EST MOD 30 MIN: CPT | Performed by: INTERNAL MEDICINE

## 2022-05-02 RX ORDER — CARVEDILOL 25 MG/1
TABLET ORAL
Qty: 270 TABLET | Refills: 3 | Status: SHIPPED | OUTPATIENT
Start: 2022-05-02 | End: 2023-01-04 | Stop reason: SDUPTHER

## 2022-05-02 NOTE — PROGRESS NOTES
[facility] [place]  [facility] CARDIOLOGY  [address] [place] [address]  Dept: [phone]  Dept Fax: [phone]    Sabina Harkins  1950    Follow Up Office Visit Note    History of Present Illness:  Sabina Harkins is a 71 y.o. female who presents to the clinic for Follow-up. CAD- She denies any chest pain, the stress nuclear was normal, normal EF, she has prior stents in 2006 and also in 2011 to Cx plus cath in 2015 has 100% LAD and 50% RCA and 50% CX, , her Hr today is fast 110, she has been on Coreg 25 bid, will increase to 37,5 bid,     The following portions of the patient's history were reviewed and updated as appropriate: allergies, current medications, past family history, past medical history, past social history, past surgical history and problem list.    Medications:  allopurinol  aspirin  atorvastatin  carvedilol  clopidogrel  ezetimibe  loratadine  losartan  nitroglycerin  oxybutynin XL  pantoprazole  polyethylene glycol  potassium chloride tablet controlled-release  sertraline  SM Vitamin D3 capsule  torsemide    Subjective  Allergies   Allergen Reactions   • Codeine Unknown - High Severity     Pt had surgery and doctor believes she might be allergic to it. Patient is not sure what it does.   • Nabumetone Unknown - High Severity   • Gabapentin Hallucinations        Past Medical History:   Diagnosis Date   • Allergic rhinitis    • Bladder disorder    • Body mass index (BMI) of 40.0 to 44.9 in adult ([facility])    • CAD (coronary artery disease)    • Chest pain    • Chronic bilateral low back pain with sciatica    • Chronic diastolic heart failure (HCC)    • Chronic systolic (congestive) heart failure ([facility])    • Complex dyslipidemia    • Coronary artery disease involving native coronary artery of native heart without angina pectoris    • Depressive disorder    • Diastolic heart failure, stage B ([facility])    • Dyspnea    • Essential hypertension    • Gait abnormality    • GERD  without esophagitis    • Gout    • Heart disease    • History of colonic polyps    • Hyperglycemia    • Hypokalemia    • Idiopathic 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 tophus    • Increased frequency of urination    • Knee pain    • LAD (linear IgA dermatosis)    • Left ventricular diastolic dysfunction    • Long term (current) use of opiate analgesic    • Low back pain at multiple sites    • Lumbar radiculitis    • Mixed hyperlipidemia    • Morbid obesity (HCC)    • Multiple vessel coronary artery disease    • Myocardial infarction (HCC)    • Osteoarthritis of knee    • Other chronic pain    • Overactive bladder    • Pure hypercholesterolemia    • Seasonal allergies    • SOB (shortness of breath)    • Vitamin B12 deficiency    • Vitamin D deficiency        Past Surgical History:   Procedure Laterality Date   • CARDIAC CATHETERIZATION     • COLONOSCOPY     • CORONARY STENT PLACEMENT     • KNEE SURGERY      ARTHROSCOPIC SURGERY RIGHT KNEE   • LUMBAR FUSION     • OTHER SURGICAL HISTORY      RESECTION OF SWEAT GLAND AND ACELOUS SKIN   • TUBAL ABDOMINAL LIGATION Bilateral        Family History   Problem Relation Age of Onset   • Colon cancer Mother    • Stroke Father    • Hypertension Sister    • Cardiomyopathy Brother    • Stroke Other    • Diabetes type II Daughter    • Diabetes type II Son         Social History     Socioeconomic History   • Marital status: Unknown   Tobacco Use   • Smoking status: Never Smoker   • Smokeless tobacco: Never Used   Vaping Use   • Vaping Use: Never used   Substance and Sexual Activity   • Alcohol use: Yes   • Drug use: Never   • Sexual activity: Defer       Review of Systems   Constitutional: Negative.    HENT: Negative.    Respiratory: Negative.    Cardiovascular: Negative.    Endocrine: Negative.    Genitourinary: Negative.    Musculoskeletal: Negative.    Skin: Negative.    Allergic/Immunologic: Negative.    Neurological: Negative.    Hematological: Negative.   "  Psychiatric/Behavioral: Negative.        Cardiovascular Procedures    ECHO/MUGA:   STRESS TESTS:   CARDIAC CATH:   DEVICES:   HOLTER:   CT/MRI:   VASCULAR:   CARDIOTHORACIC:     Objective  Vitals:    05/02/22 1511   BP: (!) 160/108   BP Location: Right arm   Patient Position: Sitting   Cuff Size: Large Adult   Pulse: 113   Resp: 22   Temp: 96.6 °F (35.9 °C)   TempSrc: Temporal   SpO2: 97%   Weight: 129 kg (285 lb)   Height: 170.2 cm (67\")   PainSc: 0-No pain     Body mass index is 44.64 kg/m².     Physical Exam  Constitutional:       Appearance: Healthy appearance. Not in distress.   Neck:      Vascular: No JVR. JVD normal.   Pulmonary:      Effort: Pulmonary effort is normal.      Breath sounds: Normal breath sounds. No wheezing. No rhonchi. No rales.   Chest:      Chest wall: Not tender to palpatation.   Cardiovascular:      PMI at left midclavicular line. Normal rate. Regular rhythm. Normal S1. Normal S2.      Murmurs: There is no murmur.      No gallop. No click. No rub.   Pulses:     Intact distal pulses.   Edema:     Peripheral edema absent.   Abdominal:      General: Bowel sounds are normal.      Palpations: Abdomen is soft.      Tenderness: There is no abdominal tenderness.   Musculoskeletal: Normal range of motion.         General: No tenderness. Skin:     General: Skin is warm and dry.   Neurological:      General: No focal deficit present.      Mental Status: Alert and oriented to person, place and time.          Diagnostic Data  Procedures    Assessment and Plan  Diagnoses and all orders for this visit:    Coronary artery disease involving native coronary artery of native heart without angina pectoris-No chest pain, but feels fatigue and tired, stress nuclear normal,     Chronic diastolic congestive heart failure (HCC)- On torsemide 20 mg, no edema, lab few weeks ago was fine, we might consider Aldactone in the future    Essential hypertension- the BP is 130.80, on Coreg, Losartan 100 mg and Torsemide " 20 mg    Hyperlipidemia LDL goal <70- on Lipitor and Zetia    Morbid obesity with BMI of 40.0-44.9, adult (HCC)    ERICA (obstructive sleep apnea)- No using the CPAP, advised to see sleep doctor    Other orders  -     carvedilol (COREG) 25 MG tablet; Take 37.,5 bid         Return in about 3 months (around 8/2/2022) for Recheck.    Mehul Stephens MD  05/02/2022

## 2022-05-09 ENCOUNTER — OFFICE VISIT (OUTPATIENT)
Dept: FAMILY MEDICINE CLINIC | Facility: CLINIC | Age: 72
End: 2022-05-09

## 2022-05-09 VITALS
HEIGHT: 67 IN | HEART RATE: 85 BPM | TEMPERATURE: 98 F | DIASTOLIC BLOOD PRESSURE: 84 MMHG | SYSTOLIC BLOOD PRESSURE: 126 MMHG | RESPIRATION RATE: 18 BRPM | WEIGHT: 283 LBS | OXYGEN SATURATION: 96 % | BODY MASS INDEX: 44.42 KG/M2

## 2022-05-09 DIAGNOSIS — R06.02 SOB (SHORTNESS OF BREATH): Primary | ICD-10-CM

## 2022-05-09 DIAGNOSIS — E66.01 MORBID OBESITY WITH BMI OF 40.0-44.9, ADULT: ICD-10-CM

## 2022-05-09 DIAGNOSIS — F41.1 ANXIETY STATE: ICD-10-CM

## 2022-05-09 PROBLEM — F32.A DEPRESSIVE DISORDER: Status: ACTIVE | Noted: 2022-05-09

## 2022-05-09 PROBLEM — M19.90 ARTHRITIS: Status: ACTIVE | Noted: 2022-05-09

## 2022-05-09 PROBLEM — K21.9 GASTROESOPHAGEAL REFLUX DISEASE: Status: ACTIVE | Noted: 2022-05-09

## 2022-05-09 PROCEDURE — 99214 OFFICE O/P EST MOD 30 MIN: CPT | Performed by: PHYSICIAN ASSISTANT

## 2022-05-09 RX ORDER — OXYBUTYNIN CHLORIDE 15 MG/1
TABLET, EXTENDED RELEASE ORAL
COMMUNITY
Start: 2022-01-31 | End: 2022-11-28

## 2022-05-09 RX ORDER — PROMETHAZINE HYDROCHLORIDE 25 MG/1
TABLET ORAL
COMMUNITY
Start: 2022-01-31

## 2022-05-09 RX ORDER — ALPRAZOLAM 1 MG/1
1 TABLET ORAL 2 TIMES DAILY PRN
Qty: 5 TABLET | Refills: 0 | Status: SHIPPED | OUTPATIENT
Start: 2022-05-09 | End: 2023-01-06 | Stop reason: ALTCHOICE

## 2022-05-09 NOTE — ASSESSMENT & PLAN NOTE
"She just needs medication to get through an MRI which she has to have done and they have told her she needs to be in the \"tube, not an open MRI\" she can't even stand to think about this due to claustrophobia and they suggested talking to her PCP to get something to help her.  I am recommending Xanax and she is willing to try this.  RX for short limited supply of  Xanax will be sent to her pharmacy. CHRISTOS has been reviewed.   "

## 2022-05-09 NOTE — ASSESSMENT & PLAN NOTE
She recently saw Dr. rogers and had a heart work up which was fine, he told her it was weight related, but said her BP was high, hr was high when she was in their clinic, but she says it's because she was rushing. He suggested she get those things rechecked here. Reassurance they are fine today.  I think her SOA is multifactorial, excess weight, Diastolic heart failure, not appropriately treating her ERICA.  We discussed all these things today.  She is most interested in seeing someone for assistance with weight loss.

## 2022-05-09 NOTE — ASSESSMENT & PLAN NOTE
Patient's (Body mass index is 44.31 kg/m².) indicates that they are morbidly obese (BMI > 40 or > 35 with obesity - related health condition) with health conditions that include obstructive sleep apnea, hypertension and coronary heart disease . Weight is worsening. BMI is is above average; BMI management plan is completed. We discussed portion control, increasing exercise and consulting a Bariatric surgeon.  She is planning to see Bariatrics to discuss options.

## 2022-05-09 NOTE — PROGRESS NOTES
"Chief Complaint  Shortness of Breath (On exertion, not light headed), Weight Loss (Wants to know if I would recommend Dr. Willingham she thought about seeing him but is not really wanting surgery and wanted to know if he does other things to help with weight loss. ), and Anxiety (Has to have an MRI and she is very anxious about the test and was told to come and see about getting something to help her anxiety and claustrophobia)    Subjective          Sabina Harkins presents to [facility]IMARY [facility]  History of Present Illness   patient comes in today with multiple questions and concerns.  She recently saw Dr. Stephens and when she was in the clinic there her blood pressure was high, her pulse rate was high and she was short of breath but she said she had to rush into the office because she was late and she thinks that is why all of her parameters were elevated he suggested she come here and have them all rechecked.  She is apparently completed a full cardiac work-up and he told her that the shortness of breath was probably due to her weight and her diastolic heart failure and that she should consider weight loss which is her other question she wants to know what we think about Dr. Willingham and whether or not she should schedule an appointment to see him.  She is concerned that all he will offer is bariatric surgery but I did tell her that he has several options that he uses for weight loss.  In her 30s she is she has to have some type of MRI that requires her to be in the MRI tube and not an open MRI and she is quite anxious because she has claustrophobia they suggested that she come here and asked to get something to help her.  Objective     Vital Signs:   /84   Pulse 85   Temp 98 °F (36.7 °C) (Infrared)   Resp 18   Ht 170.2 cm (67.01\")   Wt 128 kg (283 lb)   SpO2 96%   BMI 44.31 kg/m²     Body mass index is 44.31 kg/m².    Review of Systems   Constitutional: Positive for fatigue. "   Respiratory: Positive for shortness of breath.    Cardiovascular: Negative.    Psychiatric/Behavioral: Negative.        Social History: reports that she has never smoked. She has never used smokeless tobacco. She reports current alcohol use. She reports that she does not use drugs.      Current Outpatient Medications:   •  allopurinol (ZYLOPRIM) 300 MG tablet, Take 300 mg by mouth Daily., Disp: , Rfl:   •  aspirin 81 MG EC tablet, Take 81 mg by mouth Daily., Disp: , Rfl:   •  atorvastatin (LIPITOR) 80 MG tablet, Take 80 mg by mouth Daily., Disp: , Rfl:   •  carvedilol (COREG) 25 MG tablet, Take 37.,5 bid, Disp: 270 tablet, Rfl: 3  •  Cholecalciferol ( Vitamin D3) 100 MCG (4000 UT) capsule, Take 1 capsule by mouth Daily., Disp: , Rfl:   •  clopidogrel (PLAVIX) 75 MG tablet, Take 75 mg by mouth Daily., Disp: , Rfl:   •  ezetimibe (ZETIA) 10 MG tablet, Take 1 tablet by mouth Daily., Disp: 90 tablet, Rfl: 3  •  loratadine (CLARITIN) 10 MG tablet, Take 10 mg by mouth Daily., Disp: , Rfl:   •  losartan (COZAAR) 100 MG tablet, Take 100 mg by mouth Daily., Disp: , Rfl:   •  nitroglycerin (NITROSTAT) 0.4 MG SL tablet, Place 0.4 mg under the tongue Every 5 (Five) Minutes As Needed for Chest Pain. Take no more than 3 doses in 15 minutes., Disp: , Rfl:   •  oxybutynin XL (DITROPAN XL) 15 MG 24 hr tablet, TAKE 1 TABLET BY MOUTH ONCE DAILY FOR BLADDER SPASMS, Disp: , Rfl:   •  pantoprazole (PROTONIX) 40 MG EC tablet, Take 40 mg by mouth 2 (Two) Times a Day., Disp: , Rfl:   •  polyethylene glycol (MIRALAX) 17 GM/SCOOP powder, Take 17 g by mouth Daily., Disp: , Rfl:   •  potassium chloride (K-DUR,KLOR-CON) 20 MEQ tablet controlled-release ER tablet, Take 20 mEq by mouth Daily., Disp: , Rfl:   •  promethazine (PHENERGAN) 25 MG tablet, TAKE 1/2 TO 1 (ONE-HALF TO ONE) TABLET BY MOUTH EVERY 4 TO 6 HOURS AS NEEDED FOR NAUSEA CAUTION SEDATION, Disp: , Rfl:   •  sertraline (ZOLOFT) 50 MG tablet, Take 50 mg by mouth Daily., Disp: ,  Rfl:   •  torsemide (DEMADEX) 20 MG tablet, Take 20 mg by mouth Daily., Disp: , Rfl:     Allergies: Codeine, Nabumetone, and Gabapentin    Physical Exam  Constitutional:       Appearance: She is obese.   Cardiovascular:      Rate and Rhythm: Normal rate and regular rhythm.      Heart sounds: Normal heart sounds.   Pulmonary:      Effort: Pulmonary effort is normal.      Breath sounds: Normal breath sounds.   Abdominal:      Palpations: Abdomen is soft.   Musculoskeletal:      Cervical back: Normal range of motion and neck supple.   Neurological:      Mental Status: She is alert and oriented to person, place, and time.   Psychiatric:         Mood and Affect: Mood normal.             Assessment and Plan   Diagnoses and all orders for this visit:    1. SOB (shortness of breath) (Primary)  Assessment & Plan:  She recently saw Dr. rogers and had a heart work up which was fine, he told her it was weight related, but said her BP was high, hr was high when she was in their clinic, but she says it's because she was rushing. He suggested she get those things rechecked here. Reassurance they are fine today.  I think her SOA is multifactorial, excess weight, Diastolic heart failure, not appropriately treating her ERICA.  We discussed all these things today.  She is most interested in seeing someone for assistance with weight loss.       2. Morbid obesity with BMI of 40.0-44.9, adult (HCC)  Assessment & Plan:  Patient's (Body mass index is 44.31 kg/m².) indicates that they are morbidly obese (BMI > 40 or > 35 with obesity - related health condition) with health conditions that include obstructive sleep apnea, hypertension and coronary heart disease . Weight is worsening. BMI is is above average; BMI management plan is completed. We discussed portion control, increasing exercise and consulting a Bariatric surgeon.  She is planning to see Bariatrics to discuss options.       3. Anxiety state  Assessment & Plan:  She just needs  "medication to get through an MRI which she has to have done and they have told her she needs to be in the \"tube, not an open MRI\" she can't even stand to think about this due to claustrophobia and they suggested talking to her PCP to get something to help her.  I am recommending Xanax and she is willing to try this.  RX for short limited supply of  Xanax will be sent to her pharmacy. CHRISTOS has been reviewed.             Follow Up   Return if symptoms worsen or fail to improve.  Patient was given instructions and counseling regarding her condition or for health maintenance advice. Please see specific information pulled into the AVS if appropriate.     Erika El PA-C  "

## 2022-05-31 RX ORDER — MULTIVIT-MIN/IRON/FOLIC ACID/K 18-600-40
CAPSULE ORAL
Qty: 180 CAPSULE | Refills: 0 | Status: SHIPPED | OUTPATIENT
Start: 2022-05-31 | End: 2022-09-27

## 2022-09-15 ENCOUNTER — OFFICE VISIT (OUTPATIENT)
Dept: FAMILY MEDICINE CLINIC | Facility: CLINIC | Age: 72
End: 2022-09-15

## 2022-09-15 VITALS
SYSTOLIC BLOOD PRESSURE: 130 MMHG | DIASTOLIC BLOOD PRESSURE: 82 MMHG | BODY MASS INDEX: 44.57 KG/M2 | WEIGHT: 284 LBS | HEIGHT: 67 IN

## 2022-09-15 DIAGNOSIS — R06.02 SOB (SHORTNESS OF BREATH): ICD-10-CM

## 2022-09-15 DIAGNOSIS — F32.A DEPRESSIVE DISORDER: ICD-10-CM

## 2022-09-15 DIAGNOSIS — E66.01 MORBID OBESITY WITH BMI OF 40.0-44.9, ADULT: ICD-10-CM

## 2022-09-15 DIAGNOSIS — R82.90 MALODOROUS URINE: Primary | ICD-10-CM

## 2022-09-15 PROCEDURE — 99214 OFFICE O/P EST MOD 30 MIN: CPT | Performed by: PHYSICIAN ASSISTANT

## 2022-09-15 RX ORDER — BUPROPION HYDROCHLORIDE 150 MG/1
150 TABLET ORAL DAILY
Qty: 30 TABLET | Refills: 1 | Status: SHIPPED | OUTPATIENT
Start: 2022-09-15 | End: 2023-01-05 | Stop reason: SDUPTHER

## 2022-09-27 RX ORDER — MULTIVIT-MIN/IRON/FOLIC ACID/K 18-600-40
CAPSULE ORAL
Qty: 180 CAPSULE | Refills: 0 | Status: SHIPPED | OUTPATIENT
Start: 2022-09-27 | End: 2023-01-05 | Stop reason: SDUPTHER

## 2022-11-11 RX ORDER — CLOPIDOGREL BISULFATE 75 MG/1
TABLET ORAL
Qty: 90 TABLET | Refills: 0 | Status: SHIPPED | OUTPATIENT
Start: 2022-11-11 | End: 2023-01-04 | Stop reason: SDUPTHER

## 2022-11-28 RX ORDER — OXYBUTYNIN CHLORIDE 15 MG/1
TABLET, EXTENDED RELEASE ORAL
Qty: 90 TABLET | Refills: 0 | Status: SHIPPED | OUTPATIENT
Start: 2022-11-28 | End: 2023-01-05 | Stop reason: SDUPTHER

## 2022-12-09 RX ORDER — LOSARTAN POTASSIUM 100 MG/1
TABLET ORAL
Qty: 30 TABLET | Refills: 0 | Status: SHIPPED | OUTPATIENT
Start: 2022-12-09 | End: 2023-01-04 | Stop reason: SDUPTHER

## 2022-12-09 RX ORDER — ALLOPURINOL 300 MG/1
TABLET ORAL
Qty: 30 TABLET | Refills: 0 | Status: SHIPPED | OUTPATIENT
Start: 2022-12-09 | End: 2023-01-05 | Stop reason: SDUPTHER

## 2023-01-04 ENCOUNTER — OFFICE VISIT (OUTPATIENT)
Dept: CARDIOLOGY | Facility: CLINIC | Age: 73
End: 2023-01-04
Payer: MEDICARE

## 2023-01-04 VITALS
WEIGHT: 279 LBS | TEMPERATURE: 98 F | DIASTOLIC BLOOD PRESSURE: 82 MMHG | HEART RATE: 67 BPM | RESPIRATION RATE: 16 BRPM | OXYGEN SATURATION: 99 % | BODY MASS INDEX: 43.79 KG/M2 | SYSTOLIC BLOOD PRESSURE: 144 MMHG | HEIGHT: 67 IN

## 2023-01-04 DIAGNOSIS — E78.5 HYPERLIPIDEMIA LDL GOAL <70: ICD-10-CM

## 2023-01-04 DIAGNOSIS — I50.32 CHRONIC DIASTOLIC CONGESTIVE HEART FAILURE: Primary | ICD-10-CM

## 2023-01-04 DIAGNOSIS — I25.10 CORONARY ARTERY DISEASE INVOLVING NATIVE CORONARY ARTERY OF NATIVE HEART WITHOUT ANGINA PECTORIS: ICD-10-CM

## 2023-01-04 DIAGNOSIS — G47.33 OSA (OBSTRUCTIVE SLEEP APNEA): ICD-10-CM

## 2023-01-04 DIAGNOSIS — I10 ESSENTIAL HYPERTENSION: ICD-10-CM

## 2023-01-04 DIAGNOSIS — E66.01 MORBID OBESITY WITH BMI OF 40.0-44.9, ADULT: ICD-10-CM

## 2023-01-04 PROBLEM — R82.90 MALODOROUS URINE: Status: RESOLVED | Noted: 2022-09-15 | Resolved: 2023-01-04

## 2023-01-04 PROCEDURE — 99214 OFFICE O/P EST MOD 30 MIN: CPT | Performed by: INTERNAL MEDICINE

## 2023-01-04 PROCEDURE — 93000 ELECTROCARDIOGRAM COMPLETE: CPT | Performed by: INTERNAL MEDICINE

## 2023-01-04 RX ORDER — DAPAGLIFLOZIN 10 MG/1
10 TABLET, FILM COATED ORAL DAILY
Qty: 90 TABLET | Refills: 2 | Status: SHIPPED | OUTPATIENT
Start: 2023-01-04 | End: 2023-02-01

## 2023-01-04 RX ORDER — EZETIMIBE 10 MG/1
10 TABLET ORAL DAILY
Qty: 90 TABLET | Refills: 3 | Status: SHIPPED | OUTPATIENT
Start: 2023-01-04

## 2023-01-04 RX ORDER — LOSARTAN POTASSIUM 100 MG/1
100 TABLET ORAL DAILY
Qty: 90 TABLET | Refills: 3 | Status: SHIPPED | OUTPATIENT
Start: 2023-01-04

## 2023-01-04 RX ORDER — CARVEDILOL 25 MG/1
TABLET ORAL
Qty: 270 TABLET | Refills: 3 | Status: SHIPPED | OUTPATIENT
Start: 2023-01-04

## 2023-01-04 RX ORDER — CLOPIDOGREL BISULFATE 75 MG/1
75 TABLET ORAL DAILY
Qty: 90 TABLET | Refills: 3 | Status: SHIPPED | OUTPATIENT
Start: 2023-01-04

## 2023-01-04 RX ORDER — ATORVASTATIN CALCIUM 80 MG/1
80 TABLET, FILM COATED ORAL DAILY
Qty: 90 TABLET | Refills: 3 | Status: SHIPPED | OUTPATIENT
Start: 2023-01-04

## 2023-01-04 RX ORDER — TORSEMIDE 20 MG/1
20 TABLET ORAL DAILY
Qty: 90 TABLET | Refills: 3 | Status: SHIPPED | OUTPATIENT
Start: 2023-01-04

## 2023-01-05 ENCOUNTER — OFFICE VISIT (OUTPATIENT)
Dept: FAMILY MEDICINE CLINIC | Facility: CLINIC | Age: 73
End: 2023-01-05
Payer: MEDICARE

## 2023-01-05 VITALS
OXYGEN SATURATION: 97 % | BODY MASS INDEX: 43.79 KG/M2 | WEIGHT: 279 LBS | HEART RATE: 87 BPM | SYSTOLIC BLOOD PRESSURE: 144 MMHG | DIASTOLIC BLOOD PRESSURE: 90 MMHG | HEIGHT: 67 IN

## 2023-01-05 DIAGNOSIS — E87.6 HYPOKALEMIA: ICD-10-CM

## 2023-01-05 DIAGNOSIS — R06.02 SOB (SHORTNESS OF BREATH): ICD-10-CM

## 2023-01-05 DIAGNOSIS — E55.9 VITAMIN D DEFICIENCY: ICD-10-CM

## 2023-01-05 DIAGNOSIS — F33.0 MAJOR DEPRESSIVE DISORDER, RECURRENT, MILD: ICD-10-CM

## 2023-01-05 DIAGNOSIS — N32.81 OVERACTIVE BLADDER: ICD-10-CM

## 2023-01-05 DIAGNOSIS — R19.7 DIARRHEA IN ADULT PATIENT: Primary | ICD-10-CM

## 2023-01-05 DIAGNOSIS — K21.9 GASTROESOPHAGEAL REFLUX DISEASE WITHOUT ESOPHAGITIS: ICD-10-CM

## 2023-01-05 DIAGNOSIS — M1A.0790 IDIOPATHIC CHRONIC GOUT OF ANKLE WITHOUT TOPHUS, UNSPECIFIED LATERALITY: ICD-10-CM

## 2023-01-05 DIAGNOSIS — F32.A DEPRESSIVE DISORDER: ICD-10-CM

## 2023-01-05 LAB
ALBUMIN SERPL-MCNC: 3.9 G/DL (ref 3.8–4.8)
ALBUMIN/GLOB SERPL: 1.7 {RATIO} (ref 1.2–2.2)
ALP SERPL-CCNC: 175 IU/L (ref 44–121)
ALT SERPL-CCNC: 17 IU/L (ref 0–32)
AST SERPL-CCNC: 16 IU/L (ref 0–40)
BASOPHILS # BLD AUTO: 0.1 X10E3/UL (ref 0–0.2)
BASOPHILS NFR BLD AUTO: 1 %
BILIRUB SERPL-MCNC: 0.7 MG/DL (ref 0–1.2)
BUN SERPL-MCNC: 12 MG/DL (ref 8–27)
BUN/CREAT SERPL: 12 (ref 12–28)
CALCIUM SERPL-MCNC: 8.8 MG/DL (ref 8.7–10.3)
CHLORIDE SERPL-SCNC: 113 MMOL/L (ref 96–106)
CHOLEST SERPL-MCNC: 203 MG/DL (ref 100–199)
CO2 SERPL-SCNC: 21 MMOL/L (ref 20–29)
CREAT SERPL-MCNC: 1.02 MG/DL (ref 0.57–1)
EGFRCR SERPLBLD CKD-EPI 2021: 59 ML/MIN/1.73
EOSINOPHIL # BLD AUTO: 0.1 X10E3/UL (ref 0–0.4)
EOSINOPHIL NFR BLD AUTO: 2 %
ERYTHROCYTE [DISTWIDTH] IN BLOOD BY AUTOMATED COUNT: 14.2 % (ref 11.7–15.4)
GLOBULIN SER CALC-MCNC: 2.3 G/DL (ref 1.5–4.5)
GLUCOSE SERPL-MCNC: 91 MG/DL (ref 70–99)
HCT VFR BLD AUTO: 45.1 % (ref 34–46.6)
HDLC SERPL-MCNC: 48 MG/DL
HGB BLD-MCNC: 15.2 G/DL (ref 11.1–15.9)
IMM GRANULOCYTES # BLD AUTO: 0 X10E3/UL (ref 0–0.1)
IMM GRANULOCYTES NFR BLD AUTO: 0 %
LDLC SERPL CALC-MCNC: 127 MG/DL (ref 0–99)
LYMPHOCYTES # BLD AUTO: 2.2 X10E3/UL (ref 0.7–3.1)
LYMPHOCYTES NFR BLD AUTO: 32 %
MCH RBC QN AUTO: 30.6 PG (ref 26.6–33)
MCHC RBC AUTO-ENTMCNC: 33.7 G/DL (ref 31.5–35.7)
MCV RBC AUTO: 91 FL (ref 79–97)
MONOCYTES # BLD AUTO: 0.6 X10E3/UL (ref 0.1–0.9)
MONOCYTES NFR BLD AUTO: 8 %
NEUTROPHILS # BLD AUTO: 3.9 X10E3/UL (ref 1.4–7)
NEUTROPHILS NFR BLD AUTO: 57 %
NT-PROBNP SERPL-MCNC: 193 PG/ML (ref 0–301)
PLATELET # BLD AUTO: 265 X10E3/UL (ref 150–450)
POTASSIUM SERPL-SCNC: 4.1 MMOL/L (ref 3.5–5.2)
PROT SERPL-MCNC: 6.2 G/DL (ref 6–8.5)
RBC # BLD AUTO: 4.96 X10E6/UL (ref 3.77–5.28)
SODIUM SERPL-SCNC: 147 MMOL/L (ref 134–144)
TRIGL SERPL-MCNC: 158 MG/DL (ref 0–149)
VLDLC SERPL CALC-MCNC: 28 MG/DL (ref 5–40)
WBC # BLD AUTO: 6.9 X10E3/UL (ref 3.4–10.8)

## 2023-01-05 PROCEDURE — 99214 OFFICE O/P EST MOD 30 MIN: CPT | Performed by: PHYSICIAN ASSISTANT

## 2023-01-05 RX ORDER — POTASSIUM CHLORIDE 1500 MG/1
20 TABLET, FILM COATED, EXTENDED RELEASE ORAL DAILY
Qty: 90 TABLET | Refills: 3 | Status: SHIPPED | OUTPATIENT
Start: 2023-01-05 | End: 2023-02-01 | Stop reason: SDUPTHER

## 2023-01-05 RX ORDER — ALLOPURINOL 300 MG/1
300 TABLET ORAL DAILY
Qty: 90 TABLET | Refills: 3 | Status: SHIPPED | OUTPATIENT
Start: 2023-01-05 | End: 2023-02-01 | Stop reason: SDUPTHER

## 2023-01-05 RX ORDER — BUPROPION HYDROCHLORIDE 150 MG/1
150 TABLET ORAL DAILY
Qty: 90 TABLET | Refills: 1 | Status: SHIPPED | OUTPATIENT
Start: 2023-01-05 | End: 2023-02-01 | Stop reason: SDUPTHER

## 2023-01-05 RX ORDER — OXYBUTYNIN CHLORIDE 15 MG/1
15 TABLET, EXTENDED RELEASE ORAL DAILY
Qty: 90 TABLET | Refills: 3 | Status: SHIPPED | OUTPATIENT
Start: 2023-01-05 | End: 2023-02-01 | Stop reason: SDUPTHER

## 2023-01-05 RX ORDER — MULTIVIT-MIN/IRON/FOLIC ACID/K 18-600-40
CAPSULE ORAL
Qty: 180 CAPSULE | Refills: 3 | Status: SHIPPED | OUTPATIENT
Start: 2023-01-05

## 2023-01-05 RX ORDER — PANTOPRAZOLE SODIUM 40 MG/1
40 TABLET, DELAYED RELEASE ORAL 2 TIMES DAILY
Qty: 180 TABLET | Refills: 3 | Status: SHIPPED | OUTPATIENT
Start: 2023-01-05 | End: 2023-02-01 | Stop reason: SDUPTHER

## 2023-01-06 NOTE — ASSESSMENT & PLAN NOTE
Patient's depression is recurrent and is mild without psychosis. Their depression is currently in partial remission and the condition is improving with treatment. This will be reassessed at the next regular appointment. F/U as described:patient will continue current medication therapy.

## 2023-01-06 NOTE — ASSESSMENT & PLAN NOTE
Advised patient to increase her daily fluid intake to avoid dehydration.  Recommended probiotics.

## 2023-01-06 NOTE — PROGRESS NOTES
Chief Complaint  Diarrhea and Shortness of Breath    Subjective        Sabina Harkins presents to [facility]  History of Present Illness  Patient reports today secondary to having diarrhea for the past 3 to 4 days.  Patient reports it occurs after eating.  Patient denies any known inciting event.  Patient denies any abdominal pain, fever, chills, nausea or vomiting.  Patient denies any known sick contacts.    Patient reports she has been complaining of shortness of breath for months.  Patient states going to cardiology yesterday and she was started on a new medication which should help decrease her shortness of breath.    Patient would like to have medication refilled for overactive bladder.  Diarrhea     Shortness of Breath        Objective   Vital Signs:  /90 (BP Location: Left arm, Patient Position: Sitting, Cuff Size: Large Adult)   Pulse 87   Ht 170.2 cm (67\")   Wt 127 kg (279 lb)   SpO2 97%   BMI 43.70 kg/m²   Estimated body mass index is 43.7 kg/m² as calculated from the following:    Height as of this encounter: 170.2 cm (67\").    Weight as of this encounter: 127 kg (279 lb).          Physical Exam  Vitals and nursing note reviewed.   Constitutional:       General: She is not in acute distress.     Appearance: Normal appearance.   HENT:      Head: Normocephalic.      Right Ear: Hearing normal.      Left Ear: Hearing normal.   Eyes:      Pupils: Pupils are equal, round, and reactive to light.   Cardiovascular:      Rate and Rhythm: Normal rate and regular rhythm.   Pulmonary:      Effort: Pulmonary effort is normal. No respiratory distress.   Skin:     General: Skin is warm and dry.   Neurological:      Mental Status: She is alert.   Psychiatric:         Mood and Affect: Mood normal.        Result Review :                Assessment and Plan   Diagnoses and all orders for this visit:    1. Diarrhea in adult patient (Primary)  Assessment & Plan:  Advised patient to  increase her daily fluid intake to avoid dehydration.  Recommended probiotics.      2. Depressive disorder  Assessment & Plan:  Patient's depression is recurrent and is mild without psychosis. Their depression is currently in partial remission and the condition is improving with treatment. This will be reassessed at the next regular appointment. F/U as described:patient will continue current medication therapy.    Orders:  -     sertraline (ZOLOFT) 50 MG tablet; Take 1 tablet by mouth Daily. For mood  Dispense: 90 tablet; Refill: 3  -     buPROPion XL (Wellbutrin XL) 150 MG 24 hr tablet; Take 1 tablet by mouth Daily. For appetite  Dispense: 90 tablet; Refill: 1    3. Gastroesophageal reflux disease without esophagitis  Assessment & Plan:  Continue current treatment plan    Orders:  -     pantoprazole (PROTONIX) 40 MG EC tablet; Take 1 tablet by mouth 2 (Two) Times a Day. For acid reflux  Dispense: 180 tablet; Refill: 3    4. SOB (shortness of breath)  Assessment & Plan:  Patient advised to continue follow-up with cardiology      5. Vitamin D deficiency  Assessment & Plan:  Refill patient's medication    Orders:  -     Cholecalciferol (Vitamin D) 50 MCG (2000 UT) capsule; Take 2 caps po daily for vit D  Dispense: 180 capsule; Refill: 3    6. Idiopathic chronic gout of ankle without tophus, unspecified laterality  Assessment & Plan:  Continue current treatment plan    Orders:  -     allopurinol (ZYLOPRIM) 300 MG tablet; Take 1 tablet by mouth Daily. For gout prevention  Dispense: 90 tablet; Refill: 3    7. Overactive bladder  Assessment & Plan:  Continue current treatment plan    Orders:  -     oxybutynin XL (DITROPAN XL) 15 MG 24 hr tablet; Take 1 tablet by mouth Daily. For urine  Dispense: 90 tablet; Refill: 3    8. Hypokalemia  -     potassium chloride (K-DUR,KLOR-CON) 20 MEQ tablet controlled-release ER tablet; Take 1 tablet by mouth Daily.  Dispense: 90 tablet; Refill: 3    9. Major depressive disorder,  recurrent, mild (HCC)             Follow Up   No follow-ups on file.  Patient was given instructions and counseling regarding her condition or for health maintenance advice. Please see specific information pulled into the AVS if appropriate.

## 2023-01-06 NOTE — PROGRESS NOTES
[facility] [place]  [facility] CARDIOLOGY  [address] [place] [address]  Dept: [phone]  Dept Fax: [phone]    Sabina Harkins  1950    Follow Up Office Visit Note    History of Present Illness:  Sabina Harkins is a 72 y.o. female who presents to the clinic for  CAD- She has prior stents to CX twice in 2006 and 2011, and in 2015 has cath with 100% LAD and 50% RCA and CX, on ASA, Plavix and Coreg 3,125 bid, no major chest pain, but has more SOB on walking , she has also diastolic heart failure on Torsemide 20 mg daily, will add Farxiga 10 mg daily    The following portions of the patient's history were reviewed and updated as appropriate: allergies, current medications, past family history, past medical history, past social history, past surgical history and problem list.    Medications:  allopurinol  ALPRAZolam  aspirin  atorvastatin  buPROPion XL  carvedilol  clopidogrel  ezetimibe  Farxiga tablet  loratadine  losartan  nitroglycerin  oxybutynin XL  pantoprazole  polyethylene glycol  potassium chloride tablet controlled-release  promethazine  sertraline  torsemide  Vitamin D capsule    Subjective  Allergies   Allergen Reactions   • Codeine Unknown - High Severity     Pt had surgery and doctor believes she might be allergic to it. Patient is not sure what it does.   • Nabumetone Unknown - High Severity   • Gabapentin Hallucinations        Past Medical History:   Diagnosis Date   • Allergic rhinitis    • Bladder disorder    • Body mass index (BMI) of 40.0 to 44.9 in adult (HCC)    • CAD (coronary artery disease)    • Chest pain    • Chronic bilateral low back pain with sciatica    • Chronic diastolic heart failure (HCC)    • Chronic systolic (congestive) heart failure (HCC)    • Complex dyslipidemia    • Coronary artery disease involving native coronary artery of native heart without angina pectoris    • Depressive disorder    • Diastolic heart failure, stage B ([facility])    • Dyspnea     • Essential hypertension    • Gait abnormality    • GERD without esophagitis    • Gout    • Heart disease    • History of colonic polyps    • Hyperglycemia    • Hypokalemia    • Idiopathic 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 tophus    • Increased frequency of urination    • Knee pain    • LAD (linear IgA dermatosis)    • Left ventricular diastolic dysfunction    • Long term (current) use of opiate analgesic    • Low back pain at multiple sites    • Lumbar radiculitis    • Mixed hyperlipidemia    • Morbid obesity (HCC)    • Multiple vessel coronary artery disease    • Myocardial infarction (HCC)    • Osteoarthritis of knee    • Other chronic pain    • Overactive bladder    • Pure hypercholesterolemia    • Seasonal allergies    • SOB (shortness of breath)    • Vitamin B12 deficiency    • Vitamin D deficiency        Past Surgical History:   Procedure Laterality Date   • CARDIAC CATHETERIZATION     • COLONOSCOPY     • CORONARY STENT PLACEMENT     • KNEE SURGERY      ARTHROSCOPIC SURGERY RIGHT KNEE   • LUMBAR FUSION     • OTHER SURGICAL HISTORY      RESECTION OF SWEAT GLAND AND ACELOUS SKIN   • TUBAL ABDOMINAL LIGATION Bilateral        Family History   Problem Relation Age of Onset   • Colon cancer Mother    • Stroke Father    • Hypertension Sister    • Cardiomyopathy Brother    • Stroke Other    • Diabetes type II Daughter    • Diabetes type II Son         Social History     Socioeconomic History   • Marital status:    Tobacco Use   • Smoking status: Never   • Smokeless tobacco: Never   Vaping Use   • Vaping Use: Never used   Substance and Sexual Activity   • Alcohol use: Yes   • Drug use: Never   • Sexual activity: Defer       Review of Systems   Constitutional: Negative.    HENT: Negative.    Respiratory: Positive for shortness of breath.    Cardiovascular: Positive for leg swelling.   Endocrine: Negative.    Genitourinary: Negative.    Musculoskeletal: Negative.    Skin: Negative.     Allergic/Immunologic: Negative.    Neurological: Negative.    Hematological: Negative.    Psychiatric/Behavioral: Negative.        Cardiovascular Procedures    ECHO/MUGA:   STRESS TESTS:   CARDIAC CATH:   DEVICES:   HOLTER:   CT/MRI:   VASCULAR:   CARDIOTHORACIC:     Objective  Vitals:    01/04/23 1017   BP: 144/82   BP Location: Right arm   Patient Position: Lying   Cuff Size: Adult   Pulse: 67   Resp: 16   Temp: 98 °F (36.7 °C)   TempSrc: Infrared   SpO2: 99%   Weight: 127 kg (279 lb)   Height: 170.2 cm (67\")   PainSc: 0-No pain     Body mass index is 43.7 kg/m².     Physical Exam  Vitals reviewed.   Constitutional:       Appearance: Healthy appearance. Not in distress.   Neck:      Vascular: No JVR. JVD normal.   Pulmonary:      Effort: Pulmonary effort is normal.      Breath sounds: Normal breath sounds. No wheezing. No rhonchi. No rales.   Chest:      Chest wall: Not tender to palpatation.   Cardiovascular:      PMI at left midclavicular line. Normal rate. Regular rhythm. Normal S1. Normal S2.      Murmurs: There is no murmur.      No gallop. No click. No rub.   Pulses:     Intact distal pulses.   Edema:     Peripheral edema absent.   Abdominal:      General: Bowel sounds are normal.      Palpations: Abdomen is soft.      Tenderness: There is no abdominal tenderness.   Musculoskeletal: Normal range of motion.         General: No tenderness. Skin:     General: Skin is warm and dry.   Neurological:      General: No focal deficit present.      Mental Status: Alert and oriented to person, place and time.          Diagnostic Data    ECG 12 Lead    Date/Time: 1/6/2023 10:01 AM  Performed by: Mehul Stephens MD  Authorized by: Mehul Stephens MD   Comparison: compared with previous ECG from 1/26/2022  Similar to previous ECG  Rhythm: sinus rhythm  Rate: normal  BPM: 75  QRS axis: normal    Clinical impression: normal ECG            Assessment and Plan  Diagnoses and all orders for this  visit:    Chronic diastolic congestive heart failure (HCC)- She seems having more SOB on exertion, short distances despite Torsemide, will add Farxiga 10 mg daily, will get lab  -     proBNP    Coronary artery disease involving native coronary artery of native heart without angina pectoris- No chest pain, last stress nuclear was normal on ASA, Plavix, prior stents  Last cath 2015 100% LAD and 50% CX and RCA  -     CBC & Differential  -     Comprehensive Metabolic Panel    Essential hypertension- today BP is 160.80 on Coreg 25 bid, Losartan 100 mg and Torsemide 20 mg    Hyperlipidemia LDL goal <70- On Lipitor 80 mg and zetia  -     Lipid Panel    Morbid obesity with BMI of 40.0-44.9, adult ([facility])- Advised to lose weight    ERICA (obstructive sleep apnea)- on CPAP    Other orders  -     dapagliflozin Propanediol (Farxiga) 10 MG tablet; Take 10 mg by mouth Daily.  -     atorvastatin (LIPITOR) 80 MG tablet; Take 1 tablet by mouth Daily.  -     carvedilol (COREG) 25 MG tablet; Take 37.,5 bid  -     clopidogrel (PLAVIX) 75 MG tablet; Take 1 tablet by mouth Daily.  -     ezetimibe (ZETIA) 10 MG tablet; Take 1 tablet by mouth Daily.  -     torsemide (DEMADEX) 20 MG tablet; Take 1 tablet by mouth Daily.  -     losartan (COZAAR) 100 MG tablet; Take 1 tablet by mouth Daily. for blood pressure         Return in about 1 month (around 2/4/2023) for Recheck.    Mehul Stephens MD  01/04/2023

## 2023-01-10 ENCOUNTER — TELEPHONE (OUTPATIENT)
Dept: CARDIOLOGY | Facility: CLINIC | Age: 73
End: 2023-01-10
Payer: MEDICARE

## 2023-01-10 ENCOUNTER — CLINICAL SUPPORT (OUTPATIENT)
Dept: CARDIOLOGY | Facility: CLINIC | Age: 73
End: 2023-01-10
Payer: MEDICARE

## 2023-01-10 NOTE — TELEPHONE ENCOUNTER
----- Message from Mehul Stephens MD sent at 1/6/2023  4:40 PM EST -----  Please call the patient regarding her abnormal result.- Lipids are abnormal LDL is 12,7 despite you have been on Lipitor 80 and Zetia 10m, we need shots, the goal is under 70, Repatha twice a month

## 2023-01-10 NOTE — PROGRESS NOTES
Ms. Harkins came in for Repatha shot and demonstration and also requested a BP check.  Went over the sureclick pen and patient able to verbalize to look for clear medication in window, should not be cloudy or see anything floating.  She can verbalize that the injection can be given in thighs, near umbilicus, and back of upper arms.  She demonstrated cleaning the site with alcohol and pinched up her skin and gave herself the injection and disposed of pen appropriately.  Pt understands the side effects to watch for and understands if any swelling of face, tongue, or difficulty breathing to call 911.  I will touch base with Ms. Harkins in 1 week to assess how she is feeling and tolerating the medication before sending prescription.      Pt rested for 10 minutes and BP taken with Large Adult and R arm:  140/82

## 2023-01-11 ENCOUNTER — TELEPHONE (OUTPATIENT)
Dept: CARDIOLOGY | Facility: CLINIC | Age: 73
End: 2023-01-11
Payer: MEDICARE

## 2023-01-11 NOTE — TELEPHONE ENCOUNTER
Repatha sureclick 140mg/ml   PA Case: [id number], Status: Approved, Coverage Starts on: 1/10/2023 12:00:00 AM, Coverage Ends on: 7/9/2023 12:00:00 AM. Questions? Contact [phone].

## 2023-01-18 NOTE — TELEPHONE ENCOUNTER
Spoke with Pharmacist at [facility] and repatha is going to to cost 723.00 for 84 day supply.  Calling rep for assistance with affordability.

## 2023-01-24 ENCOUNTER — TELEPHONE (OUTPATIENT)
Dept: CARDIOLOGY | Facility: CLINIC | Age: 73
End: 2023-01-24
Payer: MEDICARE

## 2023-01-24 NOTE — TELEPHONE ENCOUNTER
Spoke with Ms. Harkins and gave her the number for [facility] [employer].  She is going to call and answer her part and than let me know once she has finished and I'll send in the [employer] Safety net form.

## 2023-01-25 NOTE — TELEPHONE ENCOUNTER
Spoke with Ms. Harkins and advised we can give her a Repatha sample for now while she works on the assistance program and calls the different foundations.  She will call once she knows whether she is approved or not.

## 2023-02-01 ENCOUNTER — OFFICE VISIT (OUTPATIENT)
Dept: FAMILY MEDICINE CLINIC | Facility: CLINIC | Age: 73
End: 2023-02-01
Payer: MEDICARE

## 2023-02-01 VITALS
HEART RATE: 96 BPM | DIASTOLIC BLOOD PRESSURE: 84 MMHG | SYSTOLIC BLOOD PRESSURE: 132 MMHG | OXYGEN SATURATION: 96 % | WEIGHT: 274.2 LBS | BODY MASS INDEX: 43.03 KG/M2 | HEIGHT: 67 IN

## 2023-02-01 DIAGNOSIS — I10 ESSENTIAL HYPERTENSION: ICD-10-CM

## 2023-02-01 DIAGNOSIS — M1A.0790 IDIOPATHIC CHRONIC GOUT OF ANKLE WITHOUT TOPHUS, UNSPECIFIED LATERALITY: ICD-10-CM

## 2023-02-01 DIAGNOSIS — M54.50 CHRONIC BILATERAL LOW BACK PAIN WITHOUT SCIATICA: Primary | ICD-10-CM

## 2023-02-01 DIAGNOSIS — G89.29 CHRONIC PAIN OF BOTH KNEES: ICD-10-CM

## 2023-02-01 DIAGNOSIS — K21.9 GASTROESOPHAGEAL REFLUX DISEASE WITHOUT ESOPHAGITIS: ICD-10-CM

## 2023-02-01 DIAGNOSIS — G47.33 OSA (OBSTRUCTIVE SLEEP APNEA): ICD-10-CM

## 2023-02-01 DIAGNOSIS — I25.10 CORONARY ARTERY DISEASE INVOLVING NATIVE CORONARY ARTERY OF NATIVE HEART WITHOUT ANGINA PECTORIS: ICD-10-CM

## 2023-02-01 DIAGNOSIS — M25.561 CHRONIC PAIN OF BOTH KNEES: ICD-10-CM

## 2023-02-01 DIAGNOSIS — G89.29 CHRONIC BILATERAL LOW BACK PAIN WITHOUT SCIATICA: Primary | ICD-10-CM

## 2023-02-01 DIAGNOSIS — E87.6 HYPOKALEMIA: ICD-10-CM

## 2023-02-01 DIAGNOSIS — I50.32 CHRONIC DIASTOLIC CONGESTIVE HEART FAILURE: ICD-10-CM

## 2023-02-01 DIAGNOSIS — M25.562 CHRONIC PAIN OF BOTH KNEES: ICD-10-CM

## 2023-02-01 DIAGNOSIS — E66.01 CLASS 3 SEVERE OBESITY DUE TO EXCESS CALORIES WITH SERIOUS COMORBIDITY AND BODY MASS INDEX (BMI) OF 40.0 TO 44.9 IN ADULT: ICD-10-CM

## 2023-02-01 DIAGNOSIS — F32.A DEPRESSIVE DISORDER: ICD-10-CM

## 2023-02-01 DIAGNOSIS — N32.81 OVERACTIVE BLADDER: ICD-10-CM

## 2023-02-01 DIAGNOSIS — M15.9 GENERALIZED OSTEOARTHRITIS OF MULTIPLE SITES: ICD-10-CM

## 2023-02-01 DIAGNOSIS — R26.81 GAIT INSTABILITY: ICD-10-CM

## 2023-02-01 PROCEDURE — 99214 OFFICE O/P EST MOD 30 MIN: CPT | Performed by: FAMILY MEDICINE

## 2023-02-01 RX ORDER — BUPROPION HYDROCHLORIDE 150 MG/1
150 TABLET ORAL DAILY
Qty: 90 TABLET | Refills: 1 | Status: SHIPPED | OUTPATIENT
Start: 2023-02-01

## 2023-02-01 RX ORDER — PANTOPRAZOLE SODIUM 40 MG/1
40 TABLET, DELAYED RELEASE ORAL 2 TIMES DAILY
Qty: 180 TABLET | Refills: 1 | Status: SHIPPED | OUTPATIENT
Start: 2023-02-01

## 2023-02-01 RX ORDER — ALLOPURINOL 300 MG/1
300 TABLET ORAL DAILY
Qty: 90 TABLET | Refills: 1 | Status: SHIPPED | OUTPATIENT
Start: 2023-02-01

## 2023-02-01 RX ORDER — POTASSIUM CHLORIDE 1500 MG/1
20 TABLET, FILM COATED, EXTENDED RELEASE ORAL DAILY
Qty: 90 TABLET | Refills: 1 | Status: SHIPPED | OUTPATIENT
Start: 2023-02-01

## 2023-02-01 RX ORDER — OXYBUTYNIN CHLORIDE 15 MG/1
15 TABLET, EXTENDED RELEASE ORAL DAILY
Qty: 90 TABLET | Refills: 1 | Status: SHIPPED | OUTPATIENT
Start: 2023-02-01 | End: 2023-03-03

## 2023-02-01 NOTE — PROGRESS NOTES
"Chief Complaint  Discuss Meds    Subjective    History of Present Illness:  Sabina Harkins is a 72 y.o. female who presents today for follow-up visit regarding problems with chronic back pain, chronic bilateral knee pain, gait instability, generalized osteoarthritis, bilateral ankle gout, and mobility problems.    She does have plans to get a scooter to help with mobility given difficulties with ambulatory safety with cane use.    Ongoing follow-up with cardiology for hypertension, hyperlipidemia, coronary artery disease, chronic diastolic congestive heart failure, and sleep apnea.    Unable to afford Farxiga so stopped Farxiga use.  Continues on atorvastatin and Zetia for hyperlipidemia and they are working on getting Repatha approved.    Anxiety and depression doing well with combination of Zoloft and Wellbutrin.    Will return for fasting labs.    Plans to get set up for past-due annual wellness visit      Objective   Vital Signs:   /84 (BP Location: Left leg, Cuff Size: Large Adult)   Pulse 96   Ht 170.2 cm (67\")   Wt 124 kg (274 lb 3.2 oz)   SpO2 96%   BMI 42.95 kg/m²     Review of Systems   Constitutional: Negative for appetite change, chills and fever.   HENT: Negative for hearing loss.    Eyes: Negative for blurred vision.   Respiratory: Negative for chest tightness.    Cardiovascular: Negative for chest pain.   Gastrointestinal: Negative for abdominal pain.   Musculoskeletal: Positive for arthralgias, back pain and gait problem.   Skin: Negative for rash.   Neurological: Positive for weakness.   Psychiatric/Behavioral: Negative for depressed mood.       Past History:  Medical History: has a past medical history of Allergic rhinitis, Bladder disorder, Body mass index (BMI) of 40.0 to 44.9 in adult (HCC), CAD (coronary artery disease), Chest pain, Chronic bilateral low back pain with sciatica, Chronic diastolic heart failure (HCC), Chronic systolic (congestive) heart failure (HCC), Complex " dyslipidemia, Coronary artery disease involving native coronary artery of native heart without angina pectoris, Depressive disorder, Diastolic heart failure, stage B (HCC), Dyspnea, Essential hypertension, Gait abnormality, GERD without esophagitis, Gout, Heart disease, History of colonic polyps, Hyperglycemia, Hypokalemia, Idiopathic 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 tophus, Increased frequency of urination, Knee pain, LAD (linear IgA dermatosis), Left ventricular diastolic dysfunction, Long term (current) use of opiate analgesic, Low back pain at multiple sites, Lumbar radiculitis, Mixed hyperlipidemia, Morbid obesity (HCC), Multiple vessel coronary artery disease, Myocardial infarction (HCC), Osteoarthritis of knee, Other chronic pain, Overactive bladder, Pure hypercholesterolemia, Seasonal allergies, SOB (shortness of breath), Vitamin B12 deficiency, and Vitamin D deficiency.   Surgical History: has a past surgical history that includes Knee surgery; Tubal ligation (Bilateral); Colonoscopy; Lumbar fusion; Coronary stent placement; Other surgical history; and Cardiac catheterization.   Family History: family history includes Cardiomyopathy in her brother; Colon cancer in her mother; Diabetes type II in her daughter and son; Hypertension in her sister; Stroke in her father and another family member.   Social History: reports that she has never smoked. She has never used smokeless tobacco. She reports current alcohol use. She reports that she does not use drugs.      Current Outpatient Medications:   •  allopurinol (ZYLOPRIM) 300 MG tablet, Take 1 tablet by mouth Daily. For gout prevention, Disp: 90 tablet, Rfl: 1  •  aspirin 81 MG EC tablet, Take 81 mg by mouth Daily., Disp: , Rfl:   •  atorvastatin (LIPITOR) 80 MG tablet, Take 1 tablet by mouth Daily., Disp: 90 tablet, Rfl: 3  •  buPROPion XL (Wellbutrin XL) 150 MG 24 hr tablet, Take 1 tablet by mouth Daily. For appetite, Disp: 90 tablet, Rfl: 1  •   carvedilol (COREG) 25 MG tablet, Take 37.,5 bid, Disp: 270 tablet, Rfl: 3  •  Cholecalciferol (Vitamin D) 50 MCG (2000 UT) capsule, Take 2 caps po daily for vit D, Disp: 180 capsule, Rfl: 3  •  clopidogrel (PLAVIX) 75 MG tablet, Take 1 tablet by mouth Daily., Disp: 90 tablet, Rfl: 3  •  ezetimibe (ZETIA) 10 MG tablet, Take 1 tablet by mouth Daily., Disp: 90 tablet, Rfl: 3  •  losartan (COZAAR) 100 MG tablet, Take 1 tablet by mouth Daily. for blood pressure, Disp: 90 tablet, Rfl: 3  •  nitroglycerin (NITROSTAT) 0.4 MG SL tablet, Place 0.4 mg under the tongue Every 5 (Five) Minutes As Needed for Chest Pain. Take no more than 3 doses in 15 minutes., Disp: , Rfl:   •  oxybutynin XL (DITROPAN XL) 15 MG 24 hr tablet, Take 1 tablet by mouth Daily. For urine, Disp: 90 tablet, Rfl: 1  •  pantoprazole (PROTONIX) 40 MG EC tablet, Take 1 tablet by mouth 2 (Two) Times a Day. For acid reflux, Disp: 180 tablet, Rfl: 1  •  polyethylene glycol (MIRALAX) 17 GM/SCOOP powder, Take 17 g by mouth Daily., Disp: , Rfl:   •  potassium chloride (K-TAB) 20 MEQ tablet controlled-release ER tablet, Take 1 tablet by mouth Daily., Disp: 90 tablet, Rfl: 1  •  promethazine (PHENERGAN) 25 MG tablet, TAKE 1/2 TO 1 (ONE-HALF TO ONE) TABLET BY MOUTH EVERY 4 TO 6 HOURS AS NEEDED FOR NAUSEA CAUTION SEDATION, Disp: , Rfl:   •  sertraline (ZOLOFT) 50 MG tablet, Take 1 tablet by mouth Daily. For mood, Disp: 90 tablet, Rfl: 1  •  torsemide (DEMADEX) 20 MG tablet, Take 1 tablet by mouth Daily., Disp: 90 tablet, Rfl: 3    Allergies: Codeine, Nabumetone, and Gabapentin    Physical Exam  Constitutional:       Appearance: She is obese.   HENT:      Head: Normocephalic.      Right Ear: External ear normal.      Left Ear: External ear normal.      Nose: Nose normal.   Eyes:      Pupils: Pupils are equal, round, and reactive to light.   Cardiovascular:      Rate and Rhythm: Normal rate and regular rhythm.      Heart sounds: Normal heart sounds.   Pulmonary:       Effort: Pulmonary effort is normal.      Breath sounds: Normal breath sounds.   Musculoskeletal:      Cervical back: Normal range of motion.      Comments: Slow but stable gait.  Using cane for ambulatory support but has problems with gait stability and duration of walking.  Interested in scooter use given severity of osteoarthritic pain, chronic gout in her ankles, and diastolic heart failure with coronary artery disease.   Skin:     General: Skin is warm and dry.   Neurological:      General: No focal deficit present.      Mental Status: She is alert.   Psychiatric:         Mood and Affect: Mood normal.         Behavior: Behavior normal.         Thought Content: Thought content normal.          Result Review                   Assessment and Plan  Diagnoses and all orders for this visit:    1. Chronic bilateral low back pain without sciatica (Primary)  Assessment & Plan:  Chronic back and joint pain ongoing.  Would benefit from use of scooter to help with mobility.    Ineffective control of gait instability with cane use given she fatigues easily due to her chronic back pain, chronic gout pain, chronic osteoarthritis pain, and chronic diastolic congestive heart failure.    She did benefit from aqua aerobics when available at the local [facility].  She does plan to try and get this started again at for sale      2. Chronic pain of both knees    3. Generalized osteoarthritis of multiple sites    4. Gait instability    5. Coronary artery disease involving native coronary artery of native heart without angina pectoris  Assessment & Plan:  Coronary artery disease is improving with treatment.  Continue current treatment regimen.  Cardiac status will be reassessed in 6 months.      6. Chronic diastolic congestive heart failure (HCC)  Assessment & Plan:  Unable to tolerate Farxiga cost.  Continues current regimen with ongoing follow-up with cardiology.  Adjustment in carvedilol did help with shortness of breath  episodes.        7. Essential hypertension  Assessment & Plan:  Hypertension is improving with treatment.  Continue current treatment regimen.  Blood pressure will be reassessed at the next regular appointment.      8. ERICA (obstructive sleep apnea)  Assessment & Plan:  Continue CPAP      9. Idiopathic chronic gout of ankle without tophus, unspecified laterality  Assessment & Plan:  Continue allopurinol    Orders:  -     allopurinol (ZYLOPRIM) 300 MG tablet; Take 1 tablet by mouth Daily. For gout prevention  Dispense: 90 tablet; Refill: 1    10. Depressive disorder  -     buPROPion XL (Wellbutrin XL) 150 MG 24 hr tablet; Take 1 tablet by mouth Daily. For appetite  Dispense: 90 tablet; Refill: 1  -     sertraline (ZOLOFT) 50 MG tablet; Take 1 tablet by mouth Daily. For mood  Dispense: 90 tablet; Refill: 1    11. Overactive bladder  Assessment & Plan:  Controlled with oxybutynin    Orders:  -     oxybutynin XL (DITROPAN XL) 15 MG 24 hr tablet; Take 1 tablet by mouth Daily. For urine  Dispense: 90 tablet; Refill: 1    12. Gastroesophageal reflux disease without esophagitis  Assessment & Plan:  Stable controlled current regimen    Orders:  -     pantoprazole (PROTONIX) 40 MG EC tablet; Take 1 tablet by mouth 2 (Two) Times a Day. For acid reflux  Dispense: 180 tablet; Refill: 1    13. Hypokalemia  Assessment & Plan:  Continues on potassium.  Reviewed recent normal kidney function and electrolytes.    Orders:  -     potassium chloride (K-TAB) 20 MEQ tablet controlled-release ER tablet; Take 1 tablet by mouth Daily.  Dispense: 90 tablet; Refill: 1    14. Class 3 severe obesity due to excess calories with serious comorbidity and body mass index (BMI) of 40.0 to 44.9 in adult ([facility])      Class 3 Severe Obesity (BMI >=40). Obesity-related health conditions include the following: obstructive sleep apnea, hypertension, coronary heart disease, dyslipidemias, GERD and osteoarthritis. Obesity is unchanged. BMI is is above average;  BMI management plan is completed. We discussed portion control and increasing exercise.          Follow Up  Return in about 4 weeks (around 3/1/2023) for Annual physical, Medicare Wellness.    Juan Ramon Valenzuela MD

## 2023-02-01 NOTE — ASSESSMENT & PLAN NOTE
Chronic back and joint pain ongoing.  Would benefit from use of scooter to help with mobility.    Ineffective control of gait instability with cane use given she fatigues easily due to her chronic back pain, chronic gout pain, chronic osteoarthritis pain, and chronic diastolic congestive heart failure.    She did benefit from aqua aerobics when available at the local [facility].  She does plan to try and get this started again at for sale  
Continue CPAP  
Continue allopurinol  
Continues on potassium.  Reviewed recent normal kidney function and electrolytes.  
Controlled with oxybutynin  
Coronary artery disease is improving with treatment.  Continue current treatment regimen.  Cardiac status will be reassessed in 6 months.  
Hypertension is improving with treatment.  Continue current treatment regimen.  Blood pressure will be reassessed at the next regular appointment.  
Stable controlled current regimen  
Unable to tolerate Farxiga cost.  Continues current regimen with ongoing follow-up with cardiology.  Adjustment in carvedilol did help with shortness of breath episodes.    
(0) Absent

## 2023-02-06 ENCOUNTER — OFFICE VISIT (OUTPATIENT)
Dept: CARDIOLOGY | Facility: CLINIC | Age: 73
End: 2023-02-06
Payer: MEDICARE

## 2023-02-06 VITALS
TEMPERATURE: 98 F | RESPIRATION RATE: 16 BRPM | HEART RATE: 92 BPM | SYSTOLIC BLOOD PRESSURE: 152 MMHG | BODY MASS INDEX: 41.91 KG/M2 | HEIGHT: 67 IN | WEIGHT: 267 LBS | OXYGEN SATURATION: 99 % | DIASTOLIC BLOOD PRESSURE: 84 MMHG

## 2023-02-06 DIAGNOSIS — E78.5 HYPERLIPIDEMIA LDL GOAL <70: ICD-10-CM

## 2023-02-06 DIAGNOSIS — I25.10 CORONARY ARTERY DISEASE INVOLVING NATIVE CORONARY ARTERY OF NATIVE HEART WITHOUT ANGINA PECTORIS: Chronic | ICD-10-CM

## 2023-02-06 DIAGNOSIS — G47.33 OSA (OBSTRUCTIVE SLEEP APNEA): Chronic | ICD-10-CM

## 2023-02-06 DIAGNOSIS — I50.32 CHRONIC DIASTOLIC CONGESTIVE HEART FAILURE: Primary | Chronic | ICD-10-CM

## 2023-02-06 DIAGNOSIS — I10 ESSENTIAL HYPERTENSION: Chronic | ICD-10-CM

## 2023-02-06 PROCEDURE — 99214 OFFICE O/P EST MOD 30 MIN: CPT | Performed by: INTERNAL MEDICINE

## 2023-02-06 NOTE — PROGRESS NOTES
[facility]  [facility]  [address] [place] [address]  Dept: [phone]  Dept Fax: [phone]    Sabina Harkins  1950    Follow Up Office Visit Note    History of Present Illness:  Sabina Harkins is a 72 y.o. female who presents to the clinic for  diastolic heart failure- She seems doing better has lost 7 pounds, after we start farxiga,     She can breath better, will keep same med plus Torsemide 20 mg    The following portions of the patient's history were reviewed and updated as appropriate: allergies, current medications, past family history, past medical history, past social history, past surgical history, and problem list.    Medications:  allopurinol  aspirin  atorvastatin  buPROPion XL  carvedilol  clopidogrel  ezetimibe  losartan  nitroglycerin  oxybutynin XL  pantoprazole  polyethylene glycol  potassium chloride ER tablet controlled-release  promethazine  sertraline  torsemide  Vitamin D capsule    Subjective  Allergies   Allergen Reactions   • Codeine Unknown - High Severity     Pt had surgery and doctor believes she might be allergic to it. Patient is not sure what it does.   • Nabumetone Unknown - High Severity   • Gabapentin Hallucinations        Past Medical History:   Diagnosis Date   • Allergic rhinitis    • Bladder disorder    • Body mass index (BMI) of 40.0 to 44.9 in adult ([facility])    • CAD (coronary artery disease)    • Chest pain    • Chronic bilateral low back pain with sciatica    • Chronic diastolic heart failure (HCC)    • Chronic systolic (congestive) heart failure ([facility])    • Complex dyslipidemia    • Coronary artery disease involving native coronary artery of native heart without angina pectoris    • Depressive disorder    • Diastolic heart failure, stage B ([facility])    • Dyspnea    • Essential hypertension    • Gait abnormality    • GERD without esophagitis    • Gout    • Heart disease    • History of colonic polyps    • Hyperglycemia    •  Hypokalemia    • Idiopathic 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 tophus    • Increased frequency of urination    • Knee pain    • LAD (linear IgA dermatosis)    • Left ventricular diastolic dysfunction    • Long term (current) use of opiate analgesic    • Low back pain at multiple sites    • Lumbar radiculitis    • Mixed hyperlipidemia    • Morbid obesity (HCC)    • Multiple vessel coronary artery disease    • Myocardial infarction (HCC)    • Osteoarthritis of knee    • Other chronic pain    • Overactive bladder    • Pure hypercholesterolemia    • Seasonal allergies    • SOB (shortness of breath)    • Vitamin B12 deficiency    • Vitamin D deficiency        Past Surgical History:   Procedure Laterality Date   • CARDIAC CATHETERIZATION     • COLONOSCOPY     • CORONARY STENT PLACEMENT     • KNEE SURGERY      ARTHROSCOPIC SURGERY RIGHT KNEE   • LUMBAR FUSION     • OTHER SURGICAL HISTORY      RESECTION OF SWEAT GLAND AND ACELOUS SKIN   • TUBAL ABDOMINAL LIGATION Bilateral        Family History   Problem Relation Age of Onset   • Colon cancer Mother    • Stroke Father    • Hypertension Sister    • Cardiomyopathy Brother    • Stroke Other    • Diabetes type II Daughter    • Diabetes type II Son         Social History     Socioeconomic History   • Marital status:    Tobacco Use   • Smoking status: Never   • Smokeless tobacco: Never   Vaping Use   • Vaping Use: Never used   Substance and Sexual Activity   • Alcohol use: Yes   • Drug use: Never   • Sexual activity: Defer       Review of Systems   Constitutional: Negative.    HENT: Negative.    Respiratory: Negative.    Cardiovascular: Negative.    Endocrine: Negative.    Genitourinary: Negative.    Musculoskeletal: Negative.    Skin: Negative.    Allergic/Immunologic: Negative.    Neurological: Negative.    Hematological: Negative.    Psychiatric/Behavioral: Negative.        Cardiovascular Procedures    ECHO/MUGA:  STRESS TESTS:   CARDIAC CATH:   DEVICES:   HOLTER:  "  CT/MRI:   VASCULAR:   CARDIOTHORACIC:     Objective  Vitals:    02/06/23 1304   BP: 152/84   BP Location: Right arm   Patient Position: Sitting   Cuff Size: Adult   Pulse: 92   Resp: 16   Temp: 98 °F (36.7 °C)   TempSrc: Infrared   SpO2: 99%   Weight: 121 kg (267 lb)   Height: 170.2 cm (67\")   PainSc: 0-No pain     Body mass index is 41.82 kg/m².     Physical Exam  Vitals reviewed.   Constitutional:       Appearance: Healthy appearance. Not in distress.   Neck:      Vascular: No JVR. JVD normal.   Pulmonary:      Effort: Pulmonary effort is normal.      Breath sounds: Normal breath sounds. No wheezing. No rhonchi. No rales.   Chest:      Chest wall: Not tender to palpatation.   Cardiovascular:      PMI at left midclavicular line. Normal rate. Regular rhythm. Normal S1. Normal S2.      Murmurs: There is no murmur.      No gallop. No click. No rub.   Pulses:     Intact distal pulses.   Edema:     Peripheral edema absent.   Abdominal:      General: Bowel sounds are normal.      Palpations: Abdomen is soft.      Tenderness: There is no abdominal tenderness.   Musculoskeletal: Normal range of motion.         General: No tenderness. Skin:     General: Skin is warm and dry.   Neurological:      General: No focal deficit present.      Mental Status: Alert and oriented to person, place and time.          Diagnostic Data  Procedures    Assessment and Plan  Diagnoses and all orders for this visit:    Chronic diastolic congestive heart failure (HCC)- Better on Farxiga and Torsemide 20 mg, will keep both    Coronary artery disease involving native coronary artery of native heart without angina pectoris- No chest pain, on Asa and Plavix, prior stents to CA in 2006 and also 2011,  And in 2015 has cath with 100% LAD and 50% CX and also RCA, on ASA and Plavix,    Essential hypertension- BP is fine 110.60, on Coreg 25 bid, Losartan 100 mg and Torsemide 20 mg    ERICA (obstructive sleep apnea)    Hyperlipidemia LDL goal <70- On " Lipitor, Zetia and Repatha         Return in about 3 months (around 5/6/2023) for Recheck with Dr. Stephens.    Mehul Stephens MD  02/06/2023

## 2023-03-03 ENCOUNTER — OFFICE VISIT (OUTPATIENT)
Dept: FAMILY MEDICINE CLINIC | Facility: CLINIC | Age: 73
End: 2023-03-03
Payer: MEDICARE

## 2023-03-03 VITALS
WEIGHT: 276 LBS | DIASTOLIC BLOOD PRESSURE: 84 MMHG | HEIGHT: 67 IN | HEART RATE: 88 BPM | OXYGEN SATURATION: 99 % | SYSTOLIC BLOOD PRESSURE: 116 MMHG | BODY MASS INDEX: 43.32 KG/M2

## 2023-03-03 DIAGNOSIS — G47.33 OSA (OBSTRUCTIVE SLEEP APNEA): Chronic | ICD-10-CM

## 2023-03-03 DIAGNOSIS — I50.32 CHRONIC DIASTOLIC CONGESTIVE HEART FAILURE: Chronic | ICD-10-CM

## 2023-03-03 DIAGNOSIS — R26.81 GAIT INSTABILITY: Chronic | ICD-10-CM

## 2023-03-03 DIAGNOSIS — I10 ESSENTIAL HYPERTENSION: Chronic | ICD-10-CM

## 2023-03-03 DIAGNOSIS — F32.A DEPRESSIVE DISORDER: ICD-10-CM

## 2023-03-03 DIAGNOSIS — M54.50 CHRONIC BILATERAL LOW BACK PAIN WITHOUT SCIATICA: Chronic | ICD-10-CM

## 2023-03-03 DIAGNOSIS — Z00.00 GENERAL MEDICAL EXAM: Primary | ICD-10-CM

## 2023-03-03 DIAGNOSIS — M1A.0790 IDIOPATHIC CHRONIC GOUT OF ANKLE WITHOUT TOPHUS, UNSPECIFIED LATERALITY: Chronic | ICD-10-CM

## 2023-03-03 DIAGNOSIS — M25.562 CHRONIC PAIN OF BOTH KNEES: Chronic | ICD-10-CM

## 2023-03-03 DIAGNOSIS — N39.41 URGE INCONTINENCE OF URINE: ICD-10-CM

## 2023-03-03 DIAGNOSIS — Z23 NEED FOR PROPHYLACTIC VACCINATION AGAINST STREPTOCOCCUS PNEUMONIAE (PNEUMOCOCCUS): ICD-10-CM

## 2023-03-03 DIAGNOSIS — Z12.31 SCREENING MAMMOGRAM FOR BREAST CANCER: ICD-10-CM

## 2023-03-03 DIAGNOSIS — M25.561 CHRONIC PAIN OF BOTH KNEES: Chronic | ICD-10-CM

## 2023-03-03 DIAGNOSIS — Z09 ENCOUNTER FOR FOLLOW-UP EXAMINATION AFTER COMPLETED TREATMENT FOR CONDITIONS OTHER THAN MALIGNANT NEOPLASM: ICD-10-CM

## 2023-03-03 DIAGNOSIS — Z12.31 ENCOUNTER FOR SCREENING MAMMOGRAM FOR MALIGNANT NEOPLASM OF BREAST: ICD-10-CM

## 2023-03-03 DIAGNOSIS — I25.10 CORONARY ARTERY DISEASE INVOLVING NATIVE CORONARY ARTERY OF NATIVE HEART WITHOUT ANGINA PECTORIS: Chronic | ICD-10-CM

## 2023-03-03 DIAGNOSIS — Z13.820 SCREENING FOR OSTEOPOROSIS: ICD-10-CM

## 2023-03-03 DIAGNOSIS — F41.1 ANXIETY STATE: ICD-10-CM

## 2023-03-03 DIAGNOSIS — E66.01 CLASS 3 SEVERE OBESITY DUE TO EXCESS CALORIES WITH SERIOUS COMORBIDITY AND BODY MASS INDEX (BMI) OF 40.0 TO 44.9 IN ADULT: ICD-10-CM

## 2023-03-03 DIAGNOSIS — G89.29 CHRONIC PAIN OF BOTH KNEES: Chronic | ICD-10-CM

## 2023-03-03 DIAGNOSIS — M15.9 GENERALIZED OSTEOARTHRITIS OF MULTIPLE SITES: Chronic | ICD-10-CM

## 2023-03-03 DIAGNOSIS — K21.9 GASTROESOPHAGEAL REFLUX DISEASE WITHOUT ESOPHAGITIS: Chronic | ICD-10-CM

## 2023-03-03 DIAGNOSIS — N32.81 OVERACTIVE BLADDER: ICD-10-CM

## 2023-03-03 DIAGNOSIS — G89.29 CHRONIC BILATERAL LOW BACK PAIN WITHOUT SCIATICA: Chronic | ICD-10-CM

## 2023-03-03 DIAGNOSIS — E87.6 HYPOKALEMIA: ICD-10-CM

## 2023-03-03 PROBLEM — R19.7 DIARRHEA IN ADULT PATIENT: Status: RESOLVED | Noted: 2023-01-05 | Resolved: 2023-03-03

## 2023-03-03 PROBLEM — R06.02 SOB (SHORTNESS OF BREATH): Status: RESOLVED | Noted: 2022-05-09 | Resolved: 2023-03-03

## 2023-03-03 PROBLEM — E66.813 CLASS 3 SEVERE OBESITY DUE TO EXCESS CALORIES WITH SERIOUS COMORBIDITY AND BODY MASS INDEX (BMI) OF 40.0 TO 44.9 IN ADULT: Status: ACTIVE | Noted: 2022-04-13

## 2023-03-03 PROBLEM — M19.90 ARTHRITIS: Status: RESOLVED | Noted: 2022-05-09 | Resolved: 2023-03-03

## 2023-03-03 PROBLEM — E78.5 HYPERLIPIDEMIA LDL GOAL <70: Status: RESOLVED | Noted: 2021-07-09 | Resolved: 2023-03-03

## 2023-03-03 PROCEDURE — G0439 PPPS, SUBSEQ VISIT: HCPCS | Performed by: FAMILY MEDICINE

## 2023-03-03 PROCEDURE — 1170F FXNL STATUS ASSESSED: CPT | Performed by: FAMILY MEDICINE

## 2023-03-03 PROCEDURE — G0009 ADMIN PNEUMOCOCCAL VACCINE: HCPCS | Performed by: FAMILY MEDICINE

## 2023-03-03 PROCEDURE — 99213 OFFICE O/P EST LOW 20 MIN: CPT | Performed by: FAMILY MEDICINE

## 2023-03-03 PROCEDURE — 90677 PCV20 VACCINE IM: CPT | Performed by: FAMILY MEDICINE

## 2023-03-03 PROCEDURE — 1159F MED LIST DOCD IN RCRD: CPT | Performed by: FAMILY MEDICINE

## 2023-03-03 NOTE — ASSESSMENT & PLAN NOTE
Congestive heart failure due to hypertension.  Heart failure is improving with treatment.  NYHA Class I.  Continue current treatment regimen.  Heart failure will be reassessed in 6 months.

## 2023-03-03 NOTE — ASSESSMENT & PLAN NOTE
Discussed together health maintenance and screening along with vaccination options and healthy diet and exercise habits as part of the preventative counseling at their physical exam today.    Updated vaccinations with Prevnar 20.    Scheduling past-due DEXA and mammogram.    Encourage advance directive and given advanced directive packet to review.    Discussed problems with worsening urge incontinence and overactive bladder symptoms despite trial with medication therapy with Detrol LA and Ditropan XL.  She is up to 15 mg with the Ditropan XL with ongoing symptoms.  She is having side effects from this dosing of Ditropan XL.  We discussed stopping her Ditropan XL at this time and continue with protective undergarments.  Referral underway to Dr. Chou with urology in [place] as requested.

## 2023-03-03 NOTE — ASSESSMENT & PLAN NOTE
Patient's (Body mass index is 43.23 kg/m².) indicates that they are morbidly/severely obese (BMI > 40 or > 35 with obesity - related health condition) with health conditions that include obstructive sleep apnea, hypertension, coronary heart disease, dyslipidemias, GERD, osteoarthritis, lower extremity venous stasis disease and urinary stress incontinence . Weight is unchanged. BMI  is above average; BMI management plan is completed. We discussed portion control and increasing exercise.

## 2023-03-03 NOTE — PROGRESS NOTES
QUICK REFERENCE INFORMATION:  The ABCs of the Annual Wellness Visit    Subsequent Medicare Wellness Visit    @[email or web address]@    HEALTH RISK ASSESSMENT    1950    Recent Hospitalizations:  No hospitalization(s) within the last year..        Current Medical Providers:  Patient Care Team:  Juan Ramon Valenzuela MD as PCP - General (Family Medicine)  Juan Ramon Valenzuela MD as Consulting Physician (Family Medicine)        Smoking Status:  Social History     Tobacco Use   Smoking Status Never   Smokeless Tobacco Never       Alcohol Consumption:  Social History     Substance and Sexual Activity   Alcohol Use Yes       Depression Screen:   PHQ-2/PHQ-9 Depression Screening 3/3/2023   Little Interest or Pleasure in Doing Things 0-->not at all   Feeling Down, Depressed or Hopeless -   PHQ-9: Brief Depression Severity Measure Score 0       Health Habits and Functional and Cognitive Screening:  Functional & Cognitive Status 3/3/2023   Do you have difficulty preparing food and eating? No   Do you have difficulty bathing yourself, getting dressed or grooming yourself? No   Do you have difficulty using the toilet? No   Do you have difficulty moving around from place to place? No   Do you have trouble with steps or getting out of a bed or a chair? No   Current Diet Other   Dental Exam Not up to date   Eye Exam Not up to date   Exercise (times per week) 4 times per week   Current Exercises Include Walking;House Cleaning   Do you need help using the phone?  No   Are you deaf or do you have serious difficulty hearing?  No   Do you need help with transportation? Yes   Do you need help shopping? No   Do you need help preparing meals?  No   Do you need help with housework?  No   Do you need help with laundry? No   Do you need help taking your medications? No   Do you need help managing money? No   Do you ever drive or ride in a car without wearing a seat belt? No   Have you felt unusual stress, anger or loneliness in the last month?  No   Who do you live with? Child   If you need help, do you have trouble finding someone available to you? No   Have you been bothered in the last four weeks by sexual problems? No   Do you have difficulty concentrating, remembering or making decisions? No       Fall Risk Screen:  MICKI Fall Risk Assessment was completed, and patient is at LOW risk for falls.Assessment completed on:3/3/2023    ACE III MINI        Does the patient have evidence of cognitive impairment? No    Aspirin use counseling: Taking ASA appropriately as indicated    Recent Lab Results:  CMP:  Lab Results   Component Value Date    BUN 12 01/04/2023    CREATININE 1.02 (H) 01/04/2023    EGFRIFNONA 54 (L) 01/28/2022    EGFRIFAFRI 62 01/28/2022    BCR 12 01/04/2023     (H) 01/04/2023    K 4.1 01/04/2023    CO2 21 01/04/2023    CALCIUM 8.8 01/04/2023    PROTENTOTREF 6.2 01/04/2023    ALBUMIN 3.9 01/04/2023    LABGLOBREF 2.3 01/04/2023    LABIL2 1.7 01/04/2023    BILITOT 0.7 01/04/2023    ALKPHOS 175 (H) 01/04/2023    AST 16 01/04/2023    ALT 17 01/04/2023     HbA1c:  No results found for: HGBA1C  Microalbumin:  No results found for: MICROALBUR, POCMALB, POCCREAT  Lipid Panel  Lab Results   Component Value Date    TRIG 158 (H) 01/04/2023    HDL 48 01/04/2023     (H) 01/04/2023    AST 16 01/04/2023    ALT 17 01/04/2023       Visual Acuity:  No results found.    Age-appropriate Screening Schedule:  Refer to the list below for future screening recommendations based on patient's age, sex and/or medical conditions. Orders for these recommended tests are listed in the plan section. The patient has been provided with a written plan.    Health Maintenance   Topic Date Due   • DXA SCAN  08/23/2020   • HEPATITIS C SCREENING  Never done   • MAMMOGRAM  09/30/2021   • LIPID PANEL  01/04/2024   • ANNUAL WELLNESS VISIT  03/03/2024   • COLORECTAL CANCER SCREENING  11/11/2030   • INFLUENZA VACCINE  Completed   • Pneumococcal Vaccine 65+  Completed   •  COVID-19 Vaccine  Discontinued   • TDAP/TD VACCINES  Discontinued   • ZOSTER VACCINE  Discontinued        Subjective   History of Present Illness    Sabina Harkins is a 72 y.o. female who presents for a Subsequent Wellness Visit and to discuss problems with worsening urge incontinence despite Ditropan XL.    She is having increased problems with urge incontinence despite wearing feminine protective undergarments.  She has tried Detrol LA and Ditropan XL with minimal improvement.  She has noticed more dry mouth and constipation with increasing dosing of her oxybutynin.  She is interested in a referral to urology to discuss further work-up and treatment options given her worsening urge incontinence.    No gout flareups on allopurinol.    Continues on combination of aspirin, atorvastatin, Plavix, Zetia, losartan, torsemide, and potassium for history of coronary artery disease with chronic diastolic congestive heart failure along with hypertension and hyperlipidemia.  She does continue to follow-up with Dr. Stephens with cardiology.    Continues on CPAP for ERICA.    Chronic back and knee pain continue and she has failed Tylenol, NSAIDs, tramadol, gabapentin, and surgical intervention.  She does not want other pain management options at this time.    She is past due for DEXA and mammogram and willing to get both that scheduled.    Discussed hepatitis C screening and she will consider with her next fasting lab work.    Up-to-date with fasting labs in January with cardiology.    Discussed vaccination update options and she is willing to get Prevnar 20 today but declines other vaccines at this time.    She does not have an advance directive and is interested in advance directive packet to consider completion..    CHRONIC CONDITIONS    The following portions of the patient's history were reviewed and updated as appropriate: allergies, current medications, past family history, past medical history, past social history, past  surgical history and problem list.    Outpatient Medications Prior to Visit   Medication Sig Dispense Refill   • allopurinol (ZYLOPRIM) 300 MG tablet Take 1 tablet by mouth Daily. For gout prevention 90 tablet 1   • aspirin 81 MG EC tablet Take 1 tablet by mouth Daily.     • atorvastatin (LIPITOR) 80 MG tablet Take 1 tablet by mouth Daily. 90 tablet 3   • buPROPion XL (Wellbutrin XL) 150 MG 24 hr tablet Take 1 tablet by mouth Daily. For appetite 90 tablet 1   • carvedilol (COREG) 25 MG tablet Take 37.,5 bid 270 tablet 3   • Cholecalciferol (Vitamin D) 50 MCG (2000 UT) capsule Take 2 caps po daily for vit D 180 capsule 3   • clopidogrel (PLAVIX) 75 MG tablet Take 1 tablet by mouth Daily. 90 tablet 3   • ezetimibe (ZETIA) 10 MG tablet Take 1 tablet by mouth Daily. 90 tablet 3   • losartan (COZAAR) 100 MG tablet Take 1 tablet by mouth Daily. for blood pressure 90 tablet 3   • nitroglycerin (NITROSTAT) 0.4 MG SL tablet Place 1 tablet under the tongue Every 5 (Five) Minutes As Needed for Chest Pain. Take no more than 3 doses in 15 minutes.     • pantoprazole (PROTONIX) 40 MG EC tablet Take 1 tablet by mouth 2 (Two) Times a Day. For acid reflux 180 tablet 1   • polyethylene glycol (MIRALAX) 17 GM/SCOOP powder Take 17 g by mouth Daily.     • potassium chloride (K-TAB) 20 MEQ tablet controlled-release ER tablet Take 1 tablet by mouth Daily. 90 tablet 1   • promethazine (PHENERGAN) 25 MG tablet TAKE 1/2 TO 1 (ONE-HALF TO ONE) TABLET BY MOUTH EVERY 4 TO 6 HOURS AS NEEDED FOR NAUSEA CAUTION SEDATION     • torsemide (DEMADEX) 20 MG tablet Take 1 tablet by mouth Daily. 90 tablet 3   • oxybutynin XL (DITROPAN XL) 15 MG 24 hr tablet Take 1 tablet by mouth Daily. For urine 90 tablet 1   • sertraline (ZOLOFT) 50 MG tablet Take 1 tablet by mouth Daily. For mood 90 tablet 1     No facility-administered medications prior to visit.       Patient Active Problem List   Diagnosis   • Coronary artery disease involving native coronary artery  of native heart without angina pectoris   • Chronic diastolic congestive heart failure (HCC)   • Essential hypertension   • Class 3 severe obesity due to excess calories with serious comorbidity and body mass index (BMI) of 40.0 to 44.9 in adult (HCC)   • ERICA (obstructive sleep apnea)   • Gastroesophageal reflux disease   • Depressive disorder   • Anxiety state   • Vitamin D deficiency   • Idiopathic chronic gout of ankle without tophus   • Overactive bladder   • Chronic bilateral low back pain without sciatica   • Chronic pain of both knees   • Generalized osteoarthritis of multiple sites   • Gait instability   • Hypokalemia   • General medical exam   • Urge incontinence of urine       Advance Care Planning:  ACP discussion was held with the patient during this visit. Patient does not have an advance directive, information provided.    Identification of Risk Factors:  Risk factors include: Advance Directive Discussion  Breast Cancer/Mammogram Screening  Chronic Pain   Fall Risk  Immunizations Discussed/Encouraged (specific immunizations; Tdap, Prevnar 20 (Pneumococcal 20-valent conjugate), Shingrix and COVID19 )  Osteoporosis Risk.    Review of Systems   Constitutional: Negative for appetite change, chills, fever and unexpected weight change.   HENT: Negative for hearing loss.    Eyes: Negative for visual disturbance.   Respiratory: Negative for chest tightness, shortness of breath and wheezing.    Cardiovascular: Negative for chest pain, palpitations and leg swelling.   Gastrointestinal: Negative for abdominal pain.   Genitourinary: Positive for urgency. Negative for dysuria.        Urgent continence.  See HPI   Musculoskeletal: Positive for arthralgias, back pain and joint swelling. Negative for gait problem.   Skin: Negative for rash.   Neurological: Negative for dizziness and headaches.   Psychiatric/Behavioral: Negative for agitation and confusion. The patient is not nervous/anxious.        Compared to one  "year ago, the patient feels her physical health is the same.  Compared to one year ago, the patient feels her mental health is the same.    Objective     Physical Exam  Constitutional:       Appearance: She is obese.   HENT:      Head: Normocephalic.      Right Ear: External ear normal.      Left Ear: External ear normal.      Nose: Nose normal.   Eyes:      Pupils: Pupils are equal, round, and reactive to light.   Cardiovascular:      Rate and Rhythm: Normal rate and regular rhythm.      Heart sounds: Normal heart sounds.   Pulmonary:      Effort: Pulmonary effort is normal.      Breath sounds: Normal breath sounds.   Musculoskeletal:      Cervical back: Normal range of motion.      Comments: Slow but stable gait.  Chronic bilateral knee osteoarthritis   Skin:     General: Skin is warm and dry.   Neurological:      General: No focal deficit present.      Mental Status: She is alert.   Psychiatric:         Mood and Affect: Mood normal.         Behavior: Behavior normal.         Thought Content: Thought content normal.          Procedures     Vitals:    03/03/23 1357   BP: 116/84   BP Location: Left arm   Patient Position: Sitting   Cuff Size: Adult   Pulse: 88   SpO2: 99%   Weight: 125 kg (276 lb)   Height: 170.2 cm (67\")       Class 3 Severe Obesity (BMI >=40). Obesity-related health conditions include the following: obstructive sleep apnea, hypertension, coronary heart disease, dyslipidemias, GERD, osteoarthritis and lower extremity venous stasis disease. Obesity is unchanged. BMI is is above average; BMI management plan is completed. We discussed portion control and increasing exercise.      Lab Results   Component Value Date    WBC 6.9 01/04/2023    HGB 15.2 01/04/2023    HCT 45.1 01/04/2023    MCV 91 01/04/2023     01/04/2023     Lab Results   Component Value Date    GLUCOSE 91 01/04/2023    BUN 12 01/04/2023    CREATININE 1.02 (H) 01/04/2023    EGFRIFNONA 54 (L) 01/28/2022    EGFRIFAFRI 62 01/28/2022    " BCR 12 01/04/2023    K 4.1 01/04/2023    CO2 21 01/04/2023    CALCIUM 8.8 01/04/2023    PROTENTOTREF 6.2 01/04/2023    ALBUMIN 3.9 01/04/2023    LABIL2 1.7 01/04/2023    AST 16 01/04/2023    ALT 17 01/04/2023     Lab Results   Component Value Date    TSH 1.100 04/13/2022     No results found for: PSA  Lab Results   Component Value Date    CHLPL 203 (H) 01/04/2023    TRIG 158 (H) 01/04/2023    HDL 48 01/04/2023     (H) 01/04/2023       Assessment & Plan   Problem List Items Addressed This Visit        Cardiac and Vasculature    Coronary artery disease involving native coronary artery of native heart without angina pectoris (Chronic)    Current Assessment & Plan     Coronary artery disease is improving with treatment.  Continue current treatment regimen.  Cardiac status will be reassessed in 6 months.         Relevant Medications    nitroglycerin (NITROSTAT) 0.4 MG SL tablet    carvedilol (COREG) 25 MG tablet    clopidogrel (PLAVIX) 75 MG tablet    Chronic diastolic congestive heart failure (HCC) (Chronic)    Current Assessment & Plan     Congestive heart failure due to hypertension.  Heart failure is improving with treatment.  NYHA Class I.  Continue current treatment regimen.  Heart failure will be reassessed in 6 months.         Relevant Medications    nitroglycerin (NITROSTAT) 0.4 MG SL tablet    carvedilol (COREG) 25 MG tablet    clopidogrel (PLAVIX) 75 MG tablet    Essential hypertension (Chronic)    Current Assessment & Plan     Hypertension is improving with treatment.  Continue current treatment regimen.  Blood pressure will be reassessed at the next regular appointment.         Relevant Medications    carvedilol (COREG) 25 MG tablet    torsemide (DEMADEX) 20 MG tablet    losartan (COZAAR) 100 MG tablet       Endocrine and Metabolic    Class 3 severe obesity due to excess calories with serious comorbidity and body mass index (BMI) of 40.0 to 44.9 in adult (HCC)    Current Assessment & Plan      Patient's (Body mass index is 43.23 kg/m².) indicates that they are morbidly/severely obese (BMI > 40 or > 35 with obesity - related health condition) with health conditions that include obstructive sleep apnea, hypertension, coronary heart disease, dyslipidemias, GERD, osteoarthritis, lower extremity venous stasis disease and urinary stress incontinence . Weight is unchanged. BMI  is above average; BMI management plan is completed. We discussed portion control and increasing exercise.             Gastrointestinal Abdominal     Gastroesophageal reflux disease (Chronic)    Relevant Medications    pantoprazole (PROTONIX) 40 MG EC tablet       Genitourinary and Reproductive     Overactive bladder (Chronic)    Hypokalemia (Chronic)    Relevant Medications    potassium chloride (K-TAB) 20 MEQ tablet controlled-release ER tablet    Urge incontinence of urine (Chronic)    Relevant Orders    Ambulatory Referral to Urology (Completed)       Health Encounters    General medical exam - Primary    Current Assessment & Plan     Discussed together health maintenance and screening along with vaccination options and healthy diet and exercise habits as part of the preventative counseling at their physical exam today.    Updated vaccinations with Prevnar 20.    Scheduling past-due DEXA and mammogram.    Encourage advance directive and given advanced directive packet to review.    Discussed problems with worsening urge incontinence and overactive bladder symptoms despite trial with medication therapy with Detrol LA and Ditropan XL.  She is up to 15 mg with the Ditropan XL with ongoing symptoms.  She is having side effects from this dosing of Ditropan XL.  We discussed stopping her Ditropan XL at this time and continue with protective undergarments.  Referral underway to Dr. Chou with urology in [place] as requested.            Mental Health    Depressive disorder (Chronic)    Current Assessment & Plan     Patient's depression is  recurrent and is mild without psychosis. Their depression is currently in full remission and the condition is improving with treatment. This will be reassessed at the next regular appointment. F/U as described:patient will continue current medication therapy.         Relevant Medications    buPROPion XL (Wellbutrin XL) 150 MG 24 hr tablet    sertraline (ZOLOFT) 50 MG tablet    Anxiety state (Chronic)    Current Assessment & Plan     Doing well with Zoloft            Musculoskeletal and Injuries    Idiopathic chronic gout of ankle without tophus (Chronic)    Current Assessment & Plan     Continue allopurinol.  Will check uric acid with next visit and fasting labs         Relevant Medications    allopurinol (ZYLOPRIM) 300 MG tablet    Chronic bilateral low back pain without sciatica (Chronic)    Current Assessment & Plan     Continue exercise as able and Tylenol         Chronic pain of both knees (Chronic)    Generalized osteoarthritis of multiple sites (Chronic)    Current Assessment & Plan     Continue Tylenol arthritis            Sleep    ERICA (obstructive sleep apnea) (Chronic)    Current Assessment & Plan     Continue CPAP            Symptoms and Signs    Gait instability (Chronic)   Other Visit Diagnoses     Screening mammogram for breast cancer        Relevant Orders    DEXA Bone Density Axial    Screening for osteoporosis        Relevant Orders    Mammo Screening Bilateral With CAD    Encounter for screening mammogram for malignant neoplasm of breast        Relevant Orders    Mammo Screening Bilateral With CAD    Encounter for follow-up examination after completed treatment for conditions other than malignant neoplasm        Relevant Orders    DEXA Bone Density Axial    Need for prophylactic vaccination against Streptococcus pneumoniae (pneumococcus)        Relevant Orders    Pneumococcal Conjugate Vaccine 20-Valent (PCV20) (Completed)        Patient Self-Management and Personalized Health Advice  The patient  has been provided with information about: diet and exercise and preventive services including:   · Annual Wellness Visit (AWV)  · Bone Density Measurements  · Hepatitis C Virus Screening (beneficiaries must fall into one of the following categories to be eligible- high risk for HCV infection, born between 8087-0260, or history of blood transfusion before 1992)  · Prostate Cancer Screening   · Screening Mammography .    Outpatient Encounter Medications as of 3/3/2023   Medication Sig Dispense Refill   • allopurinol (ZYLOPRIM) 300 MG tablet Take 1 tablet by mouth Daily. For gout prevention 90 tablet 1   • aspirin 81 MG EC tablet Take 1 tablet by mouth Daily.     • atorvastatin (LIPITOR) 80 MG tablet Take 1 tablet by mouth Daily. 90 tablet 3   • buPROPion XL (Wellbutrin XL) 150 MG 24 hr tablet Take 1 tablet by mouth Daily. For appetite 90 tablet 1   • carvedilol (COREG) 25 MG tablet Take 37.,5 bid 270 tablet 3   • Cholecalciferol (Vitamin D) 50 MCG (2000 UT) capsule Take 2 caps po daily for vit D 180 capsule 3   • clopidogrel (PLAVIX) 75 MG tablet Take 1 tablet by mouth Daily. 90 tablet 3   • ezetimibe (ZETIA) 10 MG tablet Take 1 tablet by mouth Daily. 90 tablet 3   • losartan (COZAAR) 100 MG tablet Take 1 tablet by mouth Daily. for blood pressure 90 tablet 3   • nitroglycerin (NITROSTAT) 0.4 MG SL tablet Place 1 tablet under the tongue Every 5 (Five) Minutes As Needed for Chest Pain. Take no more than 3 doses in 15 minutes.     • pantoprazole (PROTONIX) 40 MG EC tablet Take 1 tablet by mouth 2 (Two) Times a Day. For acid reflux 180 tablet 1   • polyethylene glycol (MIRALAX) 17 GM/SCOOP powder Take 17 g by mouth Daily.     • potassium chloride (K-TAB) 20 MEQ tablet controlled-release ER tablet Take 1 tablet by mouth Daily. 90 tablet 1   • promethazine (PHENERGAN) 25 MG tablet TAKE 1/2 TO 1 (ONE-HALF TO ONE) TABLET BY MOUTH EVERY 4 TO 6 HOURS AS NEEDED FOR NAUSEA CAUTION SEDATION     • torsemide (DEMADEX) 20 MG tablet  Take 1 tablet by mouth Daily. 90 tablet 3   • [DISCONTINUED] oxybutynin XL (DITROPAN XL) 15 MG 24 hr tablet Take 1 tablet by mouth Daily. For urine 90 tablet 1   • sertraline (ZOLOFT) 50 MG tablet Take 1 tablet by mouth Daily. For mood 90 tablet 1     No facility-administered encounter medications on file as of 3/3/2023.       Reviewed use of high risk medication in the elderly: yes  Reviewed for potential of harmful drug interactions in the elderly: yes    Diagnoses and all orders for this visit:    1. General medical exam (Primary)  Assessment & Plan:  Discussed together health maintenance and screening along with vaccination options and healthy diet and exercise habits as part of the preventative counseling at their physical exam today.    Updated vaccinations with Prevnar 20.    Scheduling past-due DEXA and mammogram.    Encourage advance directive and given advanced directive packet to review.    Discussed problems with worsening urge incontinence and overactive bladder symptoms despite trial with medication therapy with Detrol LA and Ditropan XL.  She is up to 15 mg with the Ditropan XL with ongoing symptoms.  She is having side effects from this dosing of Ditropan XL.  We discussed stopping her Ditropan XL at this time and continue with protective undergarments.  Referral underway to Dr. Chou with urology in [place] as requested.      2. Urge incontinence of urine  -     Ambulatory Referral to Urology    3. Overactive bladder    4. Coronary artery disease involving native coronary artery of native heart without angina pectoris  Assessment & Plan:  Coronary artery disease is improving with treatment.  Continue current treatment regimen.  Cardiac status will be reassessed in 6 months.      5. Chronic diastolic congestive heart failure (HCC)  Assessment & Plan:  Congestive heart failure due to hypertension.  Heart failure is improving with treatment.  NYHA Class I.  Continue current treatment  regimen.  Heart failure will be reassessed in 6 months.      6. Essential hypertension  Assessment & Plan:  Hypertension is improving with treatment.  Continue current treatment regimen.  Blood pressure will be reassessed at the next regular appointment.      7. ERICA (obstructive sleep apnea)  Assessment & Plan:  Continue CPAP      8. Idiopathic chronic gout of ankle without tophus, unspecified laterality  Assessment & Plan:  Continue allopurinol.  Will check uric acid with next visit and fasting labs      9. Chronic bilateral low back pain without sciatica  Assessment & Plan:  Continue exercise as able and Tylenol      10. Chronic pain of both knees    11. Generalized osteoarthritis of multiple sites  Assessment & Plan:  Continue Tylenol arthritis      12. Gait instability    13. Screening mammogram for breast cancer  -     DEXA Bone Density Axial; Future    14. Screening for osteoporosis  -     Mammo Screening Bilateral With CAD; Future    15. Encounter for screening mammogram for malignant neoplasm of breast  -     Mammo Screening Bilateral With CAD; Future    16. Encounter for follow-up examination after completed treatment for conditions other than malignant neoplasm  -     DEXA Bone Density Axial; Future    17. Need for prophylactic vaccination against Streptococcus pneumoniae (pneumococcus)  -     Pneumococcal Conjugate Vaccine 20-Valent (PCV20)    18. Class 3 severe obesity due to excess calories with serious comorbidity and body mass index (BMI) of 40.0 to 44.9 in adult (HCC)  Assessment & Plan:  Patient's (Body mass index is 43.23 kg/m².) indicates that they are morbidly/severely obese (BMI > 40 or > 35 with obesity - related health condition) with health conditions that include obstructive sleep apnea, hypertension, coronary heart disease, dyslipidemias, GERD, osteoarthritis, lower extremity venous stasis disease and urinary stress incontinence . Weight is unchanged. BMI  is above average; BMI management  plan is completed. We discussed portion control and increasing exercise.       19. Hypokalemia    20. Depressive disorder  Assessment & Plan:  Patient's depression is recurrent and is mild without psychosis. Their depression is currently in full remission and the condition is improving with treatment. This will be reassessed at the next regular appointment. F/U as described:patient will continue current medication therapy.      21. Anxiety state  Assessment & Plan:  Doing well with Zoloft      22. Gastroesophageal reflux disease without esophagitis      Follow Up:  Return in about 6 months (around 9/3/2023) for Med recheck, Fasting for labs at appointment (but drink water!).     There are no Patient Instructions on file for this visit.    An After Visit Summary and PPPS with all of these plans were given to the patient.

## 2023-05-09 ENCOUNTER — OFFICE VISIT (OUTPATIENT)
Dept: CARDIOLOGY | Facility: CLINIC | Age: 73
End: 2023-05-09
Payer: MEDICARE

## 2023-05-09 VITALS
OXYGEN SATURATION: 95 % | WEIGHT: 266 LBS | HEIGHT: 67 IN | HEART RATE: 90 BPM | DIASTOLIC BLOOD PRESSURE: 66 MMHG | SYSTOLIC BLOOD PRESSURE: 98 MMHG | RESPIRATION RATE: 18 BRPM | BODY MASS INDEX: 41.75 KG/M2

## 2023-05-09 DIAGNOSIS — G47.33 OSA (OBSTRUCTIVE SLEEP APNEA): Chronic | ICD-10-CM

## 2023-05-09 DIAGNOSIS — I10 ESSENTIAL HYPERTENSION: Chronic | ICD-10-CM

## 2023-05-09 DIAGNOSIS — I25.10 CORONARY ARTERY DISEASE INVOLVING NATIVE CORONARY ARTERY OF NATIVE HEART WITHOUT ANGINA PECTORIS: Chronic | ICD-10-CM

## 2023-05-09 DIAGNOSIS — E66.01 CLASS 3 SEVERE OBESITY DUE TO EXCESS CALORIES WITH SERIOUS COMORBIDITY AND BODY MASS INDEX (BMI) OF 40.0 TO 44.9 IN ADULT: ICD-10-CM

## 2023-05-09 DIAGNOSIS — I50.32 CHRONIC DIASTOLIC CONGESTIVE HEART FAILURE: Primary | Chronic | ICD-10-CM

## 2023-05-09 RX ORDER — CARVEDILOL 25 MG/1
25 TABLET ORAL 2 TIMES DAILY
Qty: 180 TABLET | Refills: 3 | Status: SHIPPED | OUTPATIENT
Start: 2023-05-09

## 2023-05-09 RX ORDER — FESOTERODINE FUMARATE 8 MG/1
1 TABLET, EXTENDED RELEASE ORAL DAILY
COMMUNITY
Start: 2023-04-19

## 2023-05-09 NOTE — PROGRESS NOTES
[facility]  [facility]  [address] [place] [address]  Dept: [phone]  Dept Fax: [phone]    Sabina Harkins  1950    Follow Up Office Visit Note    History of Present Illness:  Sabina Harkins is a 72 y.o. female who presents to the clinic for Follow-up. Diastolic heart failure- She feels tired, fatigue but in general fine, BP is 95.60., on Torsemide 20 mg, , will lower Coreg to 25 bid, , she is also on Losartan 50 mg,she stop taking farxiga due to cost, will give some samples    The following portions of the patient's history were reviewed and updated as appropriate: allergies, current medications, past family history, past medical history, past social history, past surgical history, and problem list.    Medications:  allopurinol  aspirin  atorvastatin  buPROPion XL  carvedilol  clopidogrel  ezetimibe  fesoterodine fumarate tablet sustained-release 24 hour  losartan  nitroglycerin  pantoprazole  polyethylene glycol  potassium chloride ER tablet controlled-release  promethazine  sertraline  torsemide  Vitamin D capsule    Subjective  Allergies   Allergen Reactions   • Codeine Unknown - High Severity     Pt had surgery and doctor believes she might be allergic to it. Patient is not sure what it does.   • Nabumetone Unknown - High Severity   • Gabapentin Hallucinations        Past Medical History:   Diagnosis Date   • Allergic rhinitis    • Bladder disorder    • Body mass index (BMI) of 40.0 to 44.9 in adult    • CAD (coronary artery disease)    • Chest pain    • Chronic bilateral low back pain with sciatica    • Chronic diastolic heart failure    • Chronic systolic (congestive) heart failure    • Complex dyslipidemia    • Coronary artery disease involving native coronary artery of native heart without angina pectoris    • Depressive disorder    • Diastolic heart failure, stage B    • Dyspnea    • Essential hypertension    • Gait abnormality    • GERD without  esophagitis    • Gout    • Heart disease    • History of colonic polyps    • Hyperglycemia    • Hypokalemia    • Idiopathic 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 tophus    • Increased frequency of urination    • Knee pain    • LAD (linear IgA dermatosis)    • Left ventricular diastolic dysfunction    • Long term (current) use of opiate analgesic    • Low back pain at multiple sites    • Lumbar radiculitis    • Mixed hyperlipidemia    • Morbid obesity    • Multiple vessel coronary artery disease    • Myocardial infarction    • Osteoarthritis of knee    • Other chronic pain    • Overactive bladder    • Pure hypercholesterolemia    • Seasonal allergies    • SOB (shortness of breath)    • Vitamin B12 deficiency    • Vitamin D deficiency        Past Surgical History:   Procedure Laterality Date   • CARDIAC CATHETERIZATION     • COLONOSCOPY     • CORONARY STENT PLACEMENT     • KNEE SURGERY      ARTHROSCOPIC SURGERY RIGHT KNEE   • LUMBAR FUSION     • OTHER SURGICAL HISTORY      RESECTION OF SWEAT GLAND AND ACELOUS SKIN   • TUBAL ABDOMINAL LIGATION Bilateral        Family History   Problem Relation Age of Onset   • Colon cancer Mother    • Stroke Father    • Hypertension Sister    • Cardiomyopathy Brother    • Stroke Other    • Diabetes type II Daughter    • Diabetes type II Son         Social History     Socioeconomic History   • Marital status:    Tobacco Use   • Smoking status: Never   • Smokeless tobacco: Never   Vaping Use   • Vaping Use: Never used   Substance and Sexual Activity   • Alcohol use: Yes   • Drug use: Never   • Sexual activity: Defer       Review of Systems   Constitutional: Negative.    HENT: Negative.    Respiratory: Negative.    Cardiovascular: Negative.    Endocrine: Negative.    Genitourinary: Negative.    Musculoskeletal: Negative.    Skin: Negative.    Allergic/Immunologic: Negative.    Neurological: Negative.    Hematological: Negative.    Psychiatric/Behavioral: Negative.   "      Cardiovascular Procedures    ECHO/MUGA:  STRESS TESTS:   CARDIAC CATH:   DEVICES:   HOLTER:   CT/MRI:   VASCULAR:   CARDIOTHORACIC:     Objective  Vitals:    05/09/23 1326   BP: 98/66   BP Location: Right arm   Patient Position: Sitting   Cuff Size: Large Adult   Pulse: 90   Resp: 18   SpO2: 95%   Weight: 121 kg (266 lb)   Height: 170.2 cm (67\")   PainSc: 0-No pain     Body mass index is 41.66 kg/m².     Physical Exam  Vitals reviewed.   Constitutional:       Appearance: Healthy appearance. Not in distress.   Neck:      Vascular: No JVR. JVD normal.   Pulmonary:      Effort: Pulmonary effort is normal.      Breath sounds: Normal breath sounds. No wheezing. No rhonchi. No rales.   Chest:      Chest wall: Not tender to palpatation.   Cardiovascular:      PMI at left midclavicular line. Normal rate. Regular rhythm. Normal S1. Normal S2.      Murmurs: There is no murmur.      No gallop. No click. No rub.   Pulses:     Intact distal pulses.   Edema:     Peripheral edema absent.   Abdominal:      General: Bowel sounds are normal.      Palpations: Abdomen is soft.      Tenderness: There is no abdominal tenderness.   Musculoskeletal: Normal range of motion.         General: No tenderness. Skin:     General: Skin is warm and dry.   Neurological:      General: No focal deficit present.      Mental Status: Alert and oriented to person, place and time.          Diagnostic Data  Procedures    Assessment and Plan  Diagnoses and all orders for this visit:    Chronic diastolic congestive heart failure (HCC)-- stable just Torsemide 20 mg, unable to afford Farxiga    Coronary artery disease involving native coronary artery of native heart without angina pectoris- No chest pain, prior stents to CX in 2006 and also 2011, , cath in 2015 % and 50% CX and RCA on ASA and Plavix,    Class 3 severe obesity due to excess calories with serious comorbidity and body mass index (BMI) of 40.0 to 44.9 in adult    Essential " hypertension- BP is low 95.60, will lower Coreg to 25 bid, keep Losartan 100 mg    ERICA (obstructive sleep apnea)- On CPAP    Other orders  -     fesoterodine fumarate (TOVIAZ ER) 8 MG tablet sustained-release 24 hour tablet; Take 1 tablet by mouth Daily.  -     carvedilol (COREG) 25 MG tablet; Take 1 tablet by mouth 2 (Two) Times a Day.         Return in about 4 months (around 9/9/2023) for Recheck with Dr. Stephens.    Mehul Stephens MD  05/09/2023

## 2023-06-13 ENCOUNTER — TELEPHONE (OUTPATIENT)
Dept: CARDIOLOGY | Facility: CLINIC | Age: 73
End: 2023-06-13
Payer: MEDICARE

## 2023-06-13 NOTE — TELEPHONE ENCOUNTER
Left a message for Ms. Harkins to call back and advise if she is still taking the Repatha injection.  I do not see it on her list and need to clarify to see if I need to complete the PA.

## 2023-06-14 ENCOUNTER — TELEPHONE (OUTPATIENT)
Dept: CARDIOLOGY | Facility: CLINIC | Age: 73
End: 2023-06-14
Payer: MEDICARE

## 2023-06-14 NOTE — TELEPHONE ENCOUNTER
Message from Plan  PA Case: [id number], Status: Approved, Coverage Starts on: 1/1/2023 12:00:00 AM, Coverage Ends on: 12/31/2023 12:00:00 AM. Questions? Contact [phone]. brianne

## 2023-06-19 NOTE — TELEPHONE ENCOUNTER
See previous note. Rx sent. Spoke with Ms. Harkins and went over all lab results.  Your LDL, bad cholesterol is now 127 and should be <70 with your hx of CAD, hypertension.  She states she has been taking both the Lipitor and Zetial as perscribed.  She is agreeable to start Repatha shots and will come today for a demonstration and administer her first shot to herself.

## 2023-06-26 PROBLEM — R73.9 HYPERGLYCEMIA: Status: ACTIVE | Noted: 2023-06-26

## 2023-06-26 PROBLEM — E53.8 VITAMIN B12 DEFICIENCY: Status: ACTIVE | Noted: 2023-06-26

## 2023-11-24 DIAGNOSIS — E87.6 HYPOKALEMIA: ICD-10-CM

## 2023-11-27 RX ORDER — POTASSIUM CHLORIDE 1500 MG/1
20 TABLET, EXTENDED RELEASE ORAL DAILY
Qty: 90 TABLET | Refills: 0 | Status: SHIPPED | OUTPATIENT
Start: 2023-11-27

## 2023-12-19 ENCOUNTER — TELEPHONE (OUTPATIENT)
Dept: FAMILY MEDICINE CLINIC | Facility: CLINIC | Age: 73
End: 2023-12-19

## 2023-12-19 ENCOUNTER — TELEMEDICINE (OUTPATIENT)
Dept: FAMILY MEDICINE CLINIC | Facility: CLINIC | Age: 73
End: 2023-12-19
Payer: MEDICARE

## 2023-12-19 VITALS — WEIGHT: 265 LBS | BODY MASS INDEX: 41.5 KG/M2 | SYSTOLIC BLOOD PRESSURE: 122 MMHG | DIASTOLIC BLOOD PRESSURE: 80 MMHG

## 2023-12-19 DIAGNOSIS — N18.31 STAGE 3A CHRONIC KIDNEY DISEASE: ICD-10-CM

## 2023-12-19 DIAGNOSIS — U07.1 COVID-19 VIRUS INFECTION: Primary | ICD-10-CM

## 2023-12-19 DIAGNOSIS — E78.2 HYPERLIPIDEMIA, MIXED: ICD-10-CM

## 2023-12-19 RX ORDER — NIRMATRELVIR AND RITONAVIR 150-100 MG
2 KIT ORAL 2 TIMES DAILY
Qty: 20 TABLET | Refills: 0 | Status: SHIPPED | OUTPATIENT
Start: 2023-12-19 | End: 2023-12-20 | Stop reason: SDUPTHER

## 2023-12-19 NOTE — PROGRESS NOTES
Patient was seen today through synchronous audio/video technology. Verbal consent was obtained. The patient was located at home. Vitals signs were obtained by patient and recorded.     I was located at our [facility] office for this telehealth visit.    Chief Complaint  COVID 19 infection    History of Present Illness  Sabina Harkins is a 73 y.o. female presenting via video-conference today for covid 19.    Symptoms started Fri/Sat and worsened with urgent care visit yesterday with neg flu and strep testing but pos covid 19.  Only uptodate with first 2 shots, did not get any boosters.  Increased congested cough.  Last GFR 48 so will need renal dose reduction.  Discussed need to hold atorvastatin while on paxlovid.    Interested in tx with paxlovid to help reduce symptom severity, duration and risk for hospitalization and death.    Discussed 10 days of quarantine given she is not fully vaccinated/boosted.     The following portions of the patient's history were reviewed and updated as appropriate: allergies, current medications, past family history, past medical history, past social history, past surgical history and problem list.    Review of Systems   Constitutional:  Positive for chills and fever. Negative for appetite change and unexpected weight change.   HENT:  Positive for congestion. Negative for hearing loss.    Eyes:  Negative for visual disturbance.   Respiratory:  Positive for cough. Negative for chest tightness, shortness of breath and wheezing.    Cardiovascular:  Negative for chest pain, palpitations and leg swelling.   Gastrointestinal:  Negative for abdominal pain.   Musculoskeletal:  Negative for arthralgias, back pain and gait problem.   Skin:  Negative for rash.   Neurological:  Negative for dizziness and headaches.   Psychiatric/Behavioral:  Negative for agitation and confusion. The patient is not nervous/anxious.        Objective  /80   Wt 120 kg (265 lb)    BMI 41.50 kg/m²     Physical Exam  Constitutional:       General: She is not in acute distress.     Appearance: Normal appearance. She is not ill-appearing.   HENT:      Head: Normocephalic and atraumatic.      Right Ear: External ear normal.      Left Ear: External ear normal.      Nose: Congestion present.   Eyes:      Extraocular Movements: Extraocular movements intact.      Conjunctiva/sclera: Conjunctivae normal.      Pupils: Pupils are equal, round, and reactive to light.   Pulmonary:      Effort: Pulmonary effort is normal. No respiratory distress.      Comments: Occ cough.  No resp distress  Musculoskeletal:         General: Normal range of motion.      Cervical back: Normal range of motion.   Skin:     Findings: No rash.   Neurological:      General: No focal deficit present.      Mental Status: She is alert and oriented to person, place, and time.   Psychiatric:         Mood and Affect: Mood normal.         Behavior: Behavior normal.         Thought Content: Thought content normal.           Assessment/Plan   Diagnoses and all orders for this visit:    1. COVID-19 virus infection (Primary)  Assessment & Plan:  Discussed risks/benefits and expectations with tx    Renal function reviewed with GFR at 48 needing renal reduced dosing of paxlovid - discussed today and Rx given for lower dose of paxlovid    Discussed need to hold atorvastatin while on paxlovid to decrease med interactions and quarantine for 10 days    Close followup if no improvement/worsens.  Pt voiced understanding  and is comfortable w/ plan    Recheck scheduled already 12/29 together     Orders:  -     Nirmatrelvir&Ritonavir 150/100 (Paxlovid, 150/100,) 10 x 150 MG & 10 x 100MG tablet therapy pack tablet (for renal adjustment); Take 2 tablets by mouth 2 (Two) Times a Day for 5 days. Indications: COVID-19 Confirmed Infection, GFR 48 - renal reduced dosing, instructed to hold atorvastatin while on paxlovid.  Dispense: 20 tablet; Refill: 0    2.  Stage 3a chronic kidney disease  Assessment & Plan:  See above      3. Hyperlipidemia, mixed  Assessment & Plan:  See above - discussed need to hold atorvastatin while on tx with paxlovid                   Return if symptoms worsen or fail to improve.    Juan Ramon Valenzuela MD

## 2023-12-19 NOTE — TELEPHONE ENCOUNTER
Caller: Sabina Harkins    Relationship to patient: Self    Best call back number: [phone]     Chief complaint: MEDICATION FOR COVID    Type of visit: TELEHEALTH    Requested date: 12.19.23     Additional notes: PATIENT CALLED IN TO KNOW IF IT WAS POSSIBLE FOR THEM TO BE PRESCRIBED PAXLOVID. PATIENT IS WITHIN THE 5-DAY WINDOW AND SHOULD FIT THE CRITERIA, SO THEY WOULD LIKE TO REQUEST A PRESCRIPTION FOR THAT MEDICATION     PLEASE ADVISE

## 2023-12-19 NOTE — ASSESSMENT & PLAN NOTE
Discussed risks/benefits and expectations with tx    Renal function reviewed with GFR at 48 needing renal reduced dosing of paxlovid - discussed today and Rx given for lower dose of paxlovid    Discussed need to hold atorvastatin while on paxlovid to decrease med interactions and quarantine for 10 days    Close followup if no improvement/worsens.  Pt voiced understanding  and is comfortable w/ plan    Recheck scheduled already 12/29 together

## 2023-12-20 ENCOUNTER — TELEPHONE (OUTPATIENT)
Dept: FAMILY MEDICINE CLINIC | Facility: CLINIC | Age: 73
End: 2023-12-20
Payer: MEDICARE

## 2023-12-20 ENCOUNTER — TELEPHONE (OUTPATIENT)
Dept: FAMILY MEDICINE CLINIC | Facility: CLINIC | Age: 73
End: 2023-12-20

## 2023-12-20 ENCOUNTER — TELEPHONE (OUTPATIENT)
Dept: CARDIOLOGY | Facility: CLINIC | Age: 73
End: 2023-12-20
Payer: MEDICARE

## 2023-12-20 DIAGNOSIS — U07.1 COVID-19 VIRUS INFECTION: ICD-10-CM

## 2023-12-20 RX ORDER — NIRMATRELVIR AND RITONAVIR 150-100 MG
2 KIT ORAL 2 TIMES DAILY
Qty: 20 TABLET | Refills: 0 | Status: SHIPPED | OUTPATIENT
Start: 2023-12-20 | End: 2023-12-21 | Stop reason: SDUPTHER

## 2023-12-20 NOTE — TELEPHONE ENCOUNTER
Notified [facility] was out of paxlovid    Resent Paxlovid to [facility] on [address].  If they are also out please have Sabina see if they can transfer it to [facility] or [facility] in [place] for the medication so she can start on it today ASAP.     Thanks

## 2023-12-20 NOTE — TELEPHONE ENCOUNTER
Spoke with the [facility] pharmacist Waqas. He stated that the insurance company  needs a PA or the medication switched to molnupiravir.         Phone: [phone] Fax: [phone]          HUB TO RELAY

## 2023-12-20 NOTE — TELEPHONE ENCOUNTER
Repatha  PA Case: [id number], Status: Approved, Coverage Starts on: 1/1/2023 12:00:00 AM, Coverage Ends on: 12/31/2024 12:00:00 AM. Questions? Contact [phone].

## 2023-12-21 RX ORDER — NIRMATRELVIR AND RITONAVIR 150-100 MG
2 KIT ORAL 2 TIMES DAILY
Qty: 20 TABLET | Refills: 0 | Status: SHIPPED | OUTPATIENT
Start: 2023-12-21 | End: 2023-12-26

## 2023-12-21 NOTE — TELEPHONE ENCOUNTER
Pt states they called [facility] here in [place] and they do have Paxlovid. [facility] is just waiting for us to confirm. I do not know how that works.

## 2023-12-29 ENCOUNTER — OFFICE VISIT (OUTPATIENT)
Dept: FAMILY MEDICINE CLINIC | Facility: CLINIC | Age: 73
End: 2023-12-29
Payer: MEDICARE

## 2023-12-29 VITALS
TEMPERATURE: 96 F | SYSTOLIC BLOOD PRESSURE: 100 MMHG | BODY MASS INDEX: 41.35 KG/M2 | WEIGHT: 264 LBS | HEART RATE: 98 BPM | OXYGEN SATURATION: 98 % | DIASTOLIC BLOOD PRESSURE: 72 MMHG

## 2023-12-29 DIAGNOSIS — I10 ESSENTIAL HYPERTENSION: Chronic | ICD-10-CM

## 2023-12-29 DIAGNOSIS — F32.A DEPRESSIVE DISORDER: ICD-10-CM

## 2023-12-29 DIAGNOSIS — I50.32 CHRONIC DIASTOLIC CONGESTIVE HEART FAILURE: Chronic | ICD-10-CM

## 2023-12-29 DIAGNOSIS — F41.1 ANXIETY STATE: Chronic | ICD-10-CM

## 2023-12-29 DIAGNOSIS — E66.01 CLASS 3 SEVERE OBESITY DUE TO EXCESS CALORIES WITH SERIOUS COMORBIDITY AND BODY MASS INDEX (BMI) OF 40.0 TO 44.9 IN ADULT: ICD-10-CM

## 2023-12-29 DIAGNOSIS — N32.81 OVERACTIVE BLADDER: Chronic | ICD-10-CM

## 2023-12-29 DIAGNOSIS — G89.29 CHRONIC BILATERAL LOW BACK PAIN WITHOUT SCIATICA: Chronic | ICD-10-CM

## 2023-12-29 DIAGNOSIS — I25.10 CORONARY ARTERY DISEASE INVOLVING NATIVE CORONARY ARTERY OF NATIVE HEART WITHOUT ANGINA PECTORIS: Chronic | ICD-10-CM

## 2023-12-29 DIAGNOSIS — E55.9 VITAMIN D DEFICIENCY: ICD-10-CM

## 2023-12-29 DIAGNOSIS — M15.9 GENERALIZED OSTEOARTHRITIS OF MULTIPLE SITES: Chronic | ICD-10-CM

## 2023-12-29 DIAGNOSIS — R73.9 HYPERGLYCEMIA: ICD-10-CM

## 2023-12-29 DIAGNOSIS — M54.50 CHRONIC BILATERAL LOW BACK PAIN WITHOUT SCIATICA: Chronic | ICD-10-CM

## 2023-12-29 DIAGNOSIS — R26.81 GAIT INSTABILITY: Chronic | ICD-10-CM

## 2023-12-29 DIAGNOSIS — G47.33 OSA (OBSTRUCTIVE SLEEP APNEA): Chronic | ICD-10-CM

## 2023-12-29 DIAGNOSIS — M1A.0790 IDIOPATHIC CHRONIC GOUT OF ANKLE WITHOUT TOPHUS, UNSPECIFIED LATERALITY: Primary | ICD-10-CM

## 2023-12-29 DIAGNOSIS — E87.6 HYPOKALEMIA: ICD-10-CM

## 2023-12-29 DIAGNOSIS — K21.9 GASTROESOPHAGEAL REFLUX DISEASE WITHOUT ESOPHAGITIS: ICD-10-CM

## 2023-12-29 PROBLEM — M25.561 CHRONIC PAIN OF BOTH KNEES: Chronic | Status: RESOLVED | Noted: 2023-02-01 | Resolved: 2023-12-29

## 2023-12-29 PROBLEM — N18.31 STAGE 3A CHRONIC KIDNEY DISEASE: Status: RESOLVED | Noted: 2023-12-19 | Resolved: 2023-12-29

## 2023-12-29 PROBLEM — M25.562 CHRONIC PAIN OF BOTH KNEES: Chronic | Status: RESOLVED | Noted: 2023-02-01 | Resolved: 2023-12-29

## 2023-12-29 PROBLEM — U07.1 COVID-19 VIRUS INFECTION: Status: RESOLVED | Noted: 2023-12-19 | Resolved: 2023-12-29

## 2023-12-29 PROBLEM — E66.813 CLASS 3 SEVERE OBESITY DUE TO EXCESS CALORIES WITH SERIOUS COMORBIDITY AND BODY MASS INDEX (BMI) OF 40.0 TO 44.9 IN ADULT: Status: ACTIVE | Noted: 2023-12-29

## 2023-12-29 PROBLEM — N39.41 URGE INCONTINENCE OF URINE: Chronic | Status: RESOLVED | Noted: 2023-03-03 | Resolved: 2023-12-29

## 2023-12-29 PROBLEM — E66.813 CLASS 3 SEVERE OBESITY DUE TO EXCESS CALORIES WITH SERIOUS COMORBIDITY AND BODY MASS INDEX (BMI) OF 40.0 TO 44.9 IN ADULT: Status: RESOLVED | Noted: 2022-04-13 | Resolved: 2023-12-29

## 2023-12-29 RX ORDER — MULTIVIT-MIN/IRON/FOLIC ACID/K 18-600-40
CAPSULE ORAL
Qty: 180 CAPSULE | Refills: 1 | Status: SHIPPED | OUTPATIENT
Start: 2023-12-29

## 2023-12-29 RX ORDER — LOSARTAN POTASSIUM 100 MG/1
100 TABLET ORAL DAILY
Qty: 90 TABLET | Refills: 1 | Status: SHIPPED | OUTPATIENT
Start: 2023-12-29

## 2023-12-29 RX ORDER — ASPIRIN 81 MG/1
81 TABLET ORAL DAILY
Qty: 90 TABLET | Refills: 1 | Status: SHIPPED | OUTPATIENT
Start: 2023-12-29

## 2023-12-29 RX ORDER — ALLOPURINOL 300 MG/1
300 TABLET ORAL DAILY
Qty: 90 TABLET | Refills: 1 | Status: SHIPPED | OUTPATIENT
Start: 2023-12-29

## 2023-12-29 RX ORDER — PANTOPRAZOLE SODIUM 40 MG/1
40 TABLET, DELAYED RELEASE ORAL 2 TIMES DAILY
Qty: 180 TABLET | Refills: 1 | Status: SHIPPED | OUTPATIENT
Start: 2023-12-29

## 2023-12-29 RX ORDER — CARVEDILOL 25 MG/1
25 TABLET ORAL 2 TIMES DAILY
Qty: 180 TABLET | Refills: 1 | Status: SHIPPED | OUTPATIENT
Start: 2023-12-29

## 2023-12-29 RX ORDER — DEXTROMETHORPHAN HYDROBROMIDE AND PROMETHAZINE HYDROCHLORIDE 15; 6.25 MG/5ML; MG/5ML
SYRUP ORAL
COMMUNITY
Start: 2023-12-18 | End: 2023-12-29

## 2023-12-29 RX ORDER — ATORVASTATIN CALCIUM 80 MG/1
80 TABLET, FILM COATED ORAL DAILY
Qty: 90 TABLET | Refills: 1 | Status: SHIPPED | OUTPATIENT
Start: 2023-12-29

## 2023-12-29 RX ORDER — BUPROPION HYDROCHLORIDE 150 MG/1
150 TABLET ORAL DAILY
Qty: 90 TABLET | Refills: 1 | Status: SHIPPED | OUTPATIENT
Start: 2023-12-29

## 2023-12-29 RX ORDER — CLOPIDOGREL BISULFATE 75 MG/1
75 TABLET ORAL DAILY
Qty: 90 TABLET | Refills: 1 | Status: SHIPPED | OUTPATIENT
Start: 2023-12-29

## 2023-12-29 RX ORDER — EZETIMIBE 10 MG/1
10 TABLET ORAL DAILY
Qty: 90 TABLET | Refills: 1 | Status: SHIPPED | OUTPATIENT
Start: 2023-12-29

## 2023-12-29 RX ORDER — MOLNUPIRAVIR 200 MG/1
CAPSULE ORAL
COMMUNITY
Start: 2023-12-22 | End: 2023-12-29

## 2023-12-29 RX ORDER — POTASSIUM CHLORIDE 1500 MG/1
20 TABLET, EXTENDED RELEASE ORAL DAILY
Qty: 90 TABLET | Refills: 1 | Status: SHIPPED | OUTPATIENT
Start: 2023-12-29

## 2023-12-29 RX ORDER — TORSEMIDE 20 MG/1
20 TABLET ORAL DAILY
Qty: 90 TABLET | Refills: 1 | Status: SHIPPED | OUTPATIENT
Start: 2023-12-29

## 2023-12-29 NOTE — ASSESSMENT & PLAN NOTE
Discussed chronic back and osteoarthritic pain.  She is using Tylenol and topical Voltaren gel as needed.  We did review her past medications and discussed ability to restart Lyrica but would need UDS, [place] reviewed, and controlled substance agreement.  She would like to hold off on restarting Lyrica at this time.    Continue to use cane for ambulatory support

## 2023-12-29 NOTE — ASSESSMENT & PLAN NOTE
Stable control on Toviaz but is having more dry mouth problems.  Discussed ability to reduce her dosing and she would like to hold off and discuss this in more detail with urology at her follow-up visit next week

## 2023-12-29 NOTE — ASSESSMENT & PLAN NOTE
Doing well with current regimen.  Medications refilled.  Awaiting recheck on renal function and electrolytes with lab work today    Ongoing cardiology follow-up.  Unable to afford co-pay with Farxiga and Jardiance

## 2023-12-29 NOTE — ASSESSMENT & PLAN NOTE
Negative workup for rheumatoid arthritis and inflammatory arthritis at her last visit in June.    Continue with Tylenol and use of cane for ambulatory support

## 2023-12-29 NOTE — PROGRESS NOTES
Chief Complaint  Follow-up (6 month )    Subjective    History of Present Illness:  Sabina Harkins is a 73 y.o. female who presents today for follow-up visit and medication refills.    She has recovered well after recent episode of COVID-19.  She does still have some mild intermittent cough but it is significantly improved.    Urology did start her on Toviaz and she has noticed some increased dry mouth since starting on Toviaz.  We did discuss there is a lower dose available and she would like to wait and discuss this at her follow-up appointment next month with urology.    She does continue to have ongoing problems with chronic back and osteoarthritic pain.  She is off NSAIDs given concerns with chronic kidney disease and also given her current medications from cardiology.  We did try tramadol in the past but this was ineffective.  Gabapentin caused problems with hallucinations.  She was on Lyrica in the past from neurosurgery and she feels this may have helped but she wants to wait on restarting anything today and understands this is a controlled substance if she decides she wants to try Lyrica again she would need an office visit, UDS, and controlled substance agreement completed with me.    She would like to get a check on lab work today and she is not fasting but her lipid panel looked excellent from June.  We will recheck CMP, A1c, and uric acid.    No gout flareups on allopurinol.    Mild depression doing well with combination of Zoloft and Wellbutrin.     GERD controlled with twice daily Protonix after she failed once daily dosing and on PPI treatment.     Ongoing cardiology follow-up for management of her coronary artery disease and hyperlipidemia.  She was unable to afford Farxiga and Repatha injections but does continue on her current regimen for chronic congestive heart failure and hyperlipidemia with hypertension.     Arthritic workup with her labs in June did return negative for autoimmune and rheumatoid  arthritis.             Objective   Vital Signs:   /72 (BP Location: Left arm, Patient Position: Sitting, Cuff Size: Adult)   Pulse 98   Temp 96 °F (35.6 °C) (Temporal)   Wt 120 kg (264 lb)   SpO2 98%   BMI 41.35 kg/m²     Review of Systems   Constitutional:  Negative for appetite change, chills and fever.   HENT:  Negative for hearing loss.    Eyes:  Negative for blurred vision.   Respiratory:  Negative for chest tightness.    Cardiovascular:  Negative for chest pain.   Gastrointestinal:  Negative for abdominal pain.   Musculoskeletal:  Positive for arthralgias, back pain and gait problem.   Skin:  Negative for rash.   Psychiatric/Behavioral:  Negative for depressed mood.        Past History:  Medical History: has a past medical history of Allergic rhinitis, Bladder disorder, Body mass index (BMI) of 40.0 to 44.9 in adult, CAD (coronary artery disease), Chest pain, Chronic bilateral low back pain with sciatica, Chronic diastolic heart failure, Chronic systolic (congestive) heart failure, Complex dyslipidemia, Coronary artery disease involving native coronary artery of native heart without angina pectoris, Depressive disorder, Diastolic heart failure, stage B, Dyspnea, Essential hypertension, Gait abnormality, GERD without esophagitis, Gout, Heart disease, History of colonic polyps, Hyperglycemia, Hypokalemia, Idiopathic 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 tophus, Increased frequency of urination, Knee pain, LAD (linear IgA dermatosis), Left ventricular diastolic dysfunction, Long term (current) use of opiate analgesic, Low back pain at multiple sites, Lumbar radiculitis, Mixed hyperlipidemia, Morbid obesity, Multiple vessel coronary artery disease, Myocardial infarction, Osteoarthritis of knee, Other chronic pain, Overactive bladder, Pure hypercholesterolemia, Seasonal allergies, SOB (shortness of breath), Vitamin B12 deficiency, and Vitamin D deficiency.   Surgical History: has a past surgical history  that includes Knee surgery; Tubal ligation (Bilateral); Colonoscopy; Lumbar fusion; Coronary stent placement; Other surgical history; and Cardiac catheterization.   Family History: family history includes Cardiomyopathy in her brother; Colon cancer in her mother; Diabetes type II in her daughter and son; Hypertension in her sister; Stroke in her father and another family member.   Social History: reports that she has never smoked. She has never used smokeless tobacco. She reports current alcohol use. She reports that she does not use drugs.      Current Outpatient Medications:     allopurinol (ZYLOPRIM) 300 MG tablet, Take 1 tablet by mouth Daily. For gout prevention, Disp: 90 tablet, Rfl: 1    aspirin 81 MG EC tablet, Take 1 tablet by mouth Daily., Disp: 90 tablet, Rfl: 1    atorvastatin (LIPITOR) 80 MG tablet, Take 1 tablet by mouth Daily., Disp: 90 tablet, Rfl: 1    buPROPion XL (Wellbutrin XL) 150 MG 24 hr tablet, Take 1 tablet by mouth Daily. For appetite and mood, Disp: 90 tablet, Rfl: 1    carvedilol (COREG) 25 MG tablet, Take 1 tablet by mouth 2 (Two) Times a Day., Disp: 180 tablet, Rfl: 1    Cholecalciferol (Vitamin D) 50 MCG (2000 UT) capsule, Take 2 caps po daily for vit D, Disp: 180 capsule, Rfl: 1    clopidogrel (PLAVIX) 75 MG tablet, Take 1 tablet by mouth Daily., Disp: 90 tablet, Rfl: 1    ezetimibe (ZETIA) 10 MG tablet, Take 1 tablet by mouth Daily., Disp: 90 tablet, Rfl: 1    fesoterodine fumarate (TOVIAZ ER) 8 MG tablet sustained-release 24 hour tablet, Take 1 tablet by mouth Daily., Disp: , Rfl:     losartan (COZAAR) 100 MG tablet, Take 1 tablet by mouth Daily. for blood pressure, Disp: 90 tablet, Rfl: 1    nitroglycerin (NITROSTAT) 0.4 MG SL tablet, Place 1 tablet under the tongue Every 5 (Five) Minutes As Needed for Chest Pain. Take no more than 3 doses in 15 minutes., Disp: , Rfl:     pantoprazole (PROTONIX) 40 MG EC tablet, Take 1 tablet by mouth 2 (Two) Times a Day. For acid reflux, Disp: 180  tablet, Rfl: 1    polyethylene glycol (MIRALAX) 17 GM/SCOOP powder, Take 17 g by mouth Daily., Disp: , Rfl:     potassium chloride ER (K-TAB) 20 MEQ tablet controlled-release ER tablet, Take 1 tablet by mouth Daily., Disp: 90 tablet, Rfl: 1    promethazine (PHENERGAN) 25 MG tablet, TAKE 1/2 TO 1 (ONE-HALF TO ONE) TABLET BY MOUTH EVERY 4 TO 6 HOURS AS NEEDED FOR NAUSEA CAUTION SEDATION, Disp: , Rfl:     sertraline (ZOLOFT) 50 MG tablet, Take 1 tablet by mouth Daily. For mood, Disp: 90 tablet, Rfl: 1    torsemide (DEMADEX) 20 MG tablet, Take 1 tablet by mouth Daily., Disp: 90 tablet, Rfl: 1    Allergies: Codeine, Nabumetone, and Gabapentin    Physical Exam  Constitutional:       Appearance: She is obese.   HENT:      Head: Normocephalic.      Right Ear: External ear normal.      Left Ear: External ear normal.      Nose: Nose normal.   Eyes:      Pupils: Pupils are equal, round, and reactive to light.   Cardiovascular:      Rate and Rhythm: Normal rate and regular rhythm.      Heart sounds: Normal heart sounds.   Pulmonary:      Effort: Pulmonary effort is normal.      Breath sounds: Normal breath sounds.   Musculoskeletal:      Cervical back: Normal range of motion.      Comments: Using cane for ambulatory support.   Skin:     General: Skin is warm and dry.   Neurological:      General: No focal deficit present.      Mental Status: She is alert.   Psychiatric:         Mood and Affect: Mood normal.         Behavior: Behavior normal.         Thought Content: Thought content normal.          Result Review                   Assessment and Plan  Diagnoses and all orders for this visit:    1. Idiopathic chronic gout of ankle without tophus, unspecified laterality (Primary)  Assessment & Plan:  Continue losartan and allopurinol.  We will check uric acid with labs    Orders:  -     allopurinol (ZYLOPRIM) 300 MG tablet; Take 1 tablet by mouth Daily. For gout prevention  Dispense: 90 tablet; Refill: 1  -     Uric acid;  Future  -     Uric acid    2. Depressive disorder  Assessment & Plan:  Patient's depression is recurrent and is mild without psychosis. Their depression is currently in partial remission and the condition is improving with treatment. This will be reassessed at the next regular appointment. F/U as described:patient will continue current medication therapy.    Orders:  -     buPROPion XL (Wellbutrin XL) 150 MG 24 hr tablet; Take 1 tablet by mouth Daily. For appetite and mood  Dispense: 90 tablet; Refill: 1  -     sertraline (ZOLOFT) 50 MG tablet; Take 1 tablet by mouth Daily. For mood  Dispense: 90 tablet; Refill: 1    3. Vitamin D deficiency  -     Cholecalciferol (Vitamin D) 50 MCG (2000 UT) capsule; Take 2 caps po daily for vit D  Dispense: 180 capsule; Refill: 1    4. Gastroesophageal reflux disease without esophagitis  Assessment & Plan:  Controlled with Protonix    Orders:  -     pantoprazole (PROTONIX) 40 MG EC tablet; Take 1 tablet by mouth 2 (Two) Times a Day. For acid reflux  Dispense: 180 tablet; Refill: 1    5. Hypokalemia  Assessment & Plan:  Continue potassium supplementation.  We will recheck potassium with labs today    Orders:  -     potassium chloride ER (K-TAB) 20 MEQ tablet controlled-release ER tablet; Take 1 tablet by mouth Daily.  Dispense: 90 tablet; Refill: 1  -     Comprehensive Metabolic Panel; Future  -     Comprehensive Metabolic Panel    6. Coronary artery disease involving native coronary artery of native heart without angina pectoris  Assessment & Plan:  Coronary artery disease is improving with treatment.  Continue current treatment regimen.  Cardiac status will be reassessed in 6 months.    Orders:  -     aspirin 81 MG EC tablet; Take 1 tablet by mouth Daily.  Dispense: 90 tablet; Refill: 1  -     atorvastatin (LIPITOR) 80 MG tablet; Take 1 tablet by mouth Daily.  Dispense: 90 tablet; Refill: 1  -     carvedilol (COREG) 25 MG tablet; Take 1 tablet by mouth 2 (Two) Times a Day.   Dispense: 180 tablet; Refill: 1  -     clopidogrel (PLAVIX) 75 MG tablet; Take 1 tablet by mouth Daily.  Dispense: 90 tablet; Refill: 1  -     ezetimibe (ZETIA) 10 MG tablet; Take 1 tablet by mouth Daily.  Dispense: 90 tablet; Refill: 1  -     losartan (COZAAR) 100 MG tablet; Take 1 tablet by mouth Daily. for blood pressure  Dispense: 90 tablet; Refill: 1    7. Chronic diastolic congestive heart failure (HCC)  Assessment & Plan:  Doing well with current regimen.  Medications refilled.  Awaiting recheck on renal function and electrolytes with lab work today    Ongoing cardiology follow-up.  Unable to afford co-pay with Farxiga and Jardiance    Orders:  -     atorvastatin (LIPITOR) 80 MG tablet; Take 1 tablet by mouth Daily.  Dispense: 90 tablet; Refill: 1  -     carvedilol (COREG) 25 MG tablet; Take 1 tablet by mouth 2 (Two) Times a Day.  Dispense: 180 tablet; Refill: 1  -     clopidogrel (PLAVIX) 75 MG tablet; Take 1 tablet by mouth Daily.  Dispense: 90 tablet; Refill: 1  -     ezetimibe (ZETIA) 10 MG tablet; Take 1 tablet by mouth Daily.  Dispense: 90 tablet; Refill: 1  -     losartan (COZAAR) 100 MG tablet; Take 1 tablet by mouth Daily. for blood pressure  Dispense: 90 tablet; Refill: 1  -     torsemide (DEMADEX) 20 MG tablet; Take 1 tablet by mouth Daily.  Dispense: 90 tablet; Refill: 1  -     Comprehensive Metabolic Panel; Future  -     Comprehensive Metabolic Panel    8. Essential hypertension  Assessment & Plan:  Hypertension is improving with treatment.  Continue current treatment regimen.  Blood pressure will be reassessed at the next regular appointment.    Orders:  -     carvedilol (COREG) 25 MG tablet; Take 1 tablet by mouth 2 (Two) Times a Day.  Dispense: 180 tablet; Refill: 1  -     losartan (COZAAR) 100 MG tablet; Take 1 tablet by mouth Daily. for blood pressure  Dispense: 90 tablet; Refill: 1  -     Comprehensive Metabolic Panel; Future  -     Comprehensive Metabolic Panel    9. ERICA (obstructive sleep  apnea)  Assessment & Plan:  She has not been using CPAP regularly given problems with machine.  Encouraged CPAP use      10. Anxiety state  Assessment & Plan:  Doing well with Zoloft      11. Chronic bilateral low back pain without sciatica  Assessment & Plan:  Discussed chronic back and osteoarthritic pain.  She is using Tylenol and topical Voltaren gel as needed.  We did review her past medications and discussed ability to restart Lyrica but would need UDS, Brown reviewed, and controlled substance agreement.  She would like to hold off on restarting Lyrica at this time.    Continue to use cane for ambulatory support      12. Overactive bladder  Assessment & Plan:  Stable control on Toviaz but is having more dry mouth problems.  Discussed ability to reduce her dosing and she would like to hold off and discuss this in more detail with urology at her follow-up visit next week      13. Generalized osteoarthritis of multiple sites  Assessment & Plan:  Negative workup for rheumatoid arthritis and inflammatory arthritis at her last visit in June.    Continue with Tylenol and use of cane for ambulatory support      14. Gait instability  Assessment & Plan:  Continue with cane for ambulatory support      15. Hyperglycemia  Assessment & Plan:  Awaiting recheck on A1c with labs    Orders:  -     Hemoglobin A1c; Future  -     Hemoglobin A1c    16. Class 3 severe obesity due to excess calories with serious comorbidity and body mass index (BMI) of 40.0 to 44.9 in adult  Assessment & Plan:  Patient's (Body mass index is 41.35 kg/m².) indicates that they are morbidly/severely obese (BMI > 40 or > 35 with obesity - related health condition) with health conditions that include hypertension, diabetes mellitus, dyslipidemias, and GERD . Weight is unchanged. BMI  is above average; BMI management plan is completed. We discussed portion control and increasing exercise.                      Follow Up  Return in about 6 months (around  6/29/2024) for Medicare Wellness, Fasting for labs at appointment (but drink water!).    Juan Ramon Valenzuela MD

## 2023-12-29 NOTE — ASSESSMENT & PLAN NOTE
Patient's (Body mass index is 41.35 kg/m².) indicates that they are morbidly/severely obese (BMI > 40 or > 35 with obesity - related health condition) with health conditions that include hypertension, diabetes mellitus, dyslipidemias, and GERD . Weight is unchanged. BMI  is above average; BMI management plan is completed. We discussed portion control and increasing exercise.

## 2023-12-30 LAB
ALBUMIN SERPL-MCNC: 3.9 G/DL (ref 3.8–4.8)
ALBUMIN/GLOB SERPL: 1.5 {RATIO} (ref 1.2–2.2)
ALP SERPL-CCNC: 169 IU/L (ref 44–121)
ALT SERPL-CCNC: 36 IU/L (ref 0–32)
AST SERPL-CCNC: 31 IU/L (ref 0–40)
BILIRUB SERPL-MCNC: 0.6 MG/DL (ref 0–1.2)
BUN SERPL-MCNC: 17 MG/DL (ref 8–27)
BUN/CREAT SERPL: 14 (ref 12–28)
CALCIUM SERPL-MCNC: 9.2 MG/DL (ref 8.7–10.3)
CHLORIDE SERPL-SCNC: 107 MMOL/L (ref 96–106)
CO2 SERPL-SCNC: 21 MMOL/L (ref 20–29)
CREAT SERPL-MCNC: 1.24 MG/DL (ref 0.57–1)
EGFRCR SERPLBLD CKD-EPI 2021: 47 ML/MIN/1.73
GLOBULIN SER CALC-MCNC: 2.6 G/DL (ref 1.5–4.5)
GLUCOSE SERPL-MCNC: 92 MG/DL (ref 70–99)
HBA1C MFR BLD: 5.6 % (ref 4.8–5.6)
POTASSIUM SERPL-SCNC: 4.2 MMOL/L (ref 3.5–5.2)
PROT SERPL-MCNC: 6.5 G/DL (ref 6–8.5)
SODIUM SERPL-SCNC: 145 MMOL/L (ref 134–144)
URATE SERPL-MCNC: 3.6 MG/DL (ref 3.1–7.9)

## 2024-01-10 ENCOUNTER — TELEPHONE (OUTPATIENT)
Dept: CARDIOLOGY | Facility: CLINIC | Age: 74
End: 2024-01-10

## 2024-01-10 NOTE — TELEPHONE ENCOUNTER
Caller: Sabina Harkins    Relationship to patient: Self    Best call back number: [phone]    Chief complaint:     Type of visit: FOLLOW UP    Requested date: AFTER 1-17-24 NOT FRIDAY THE 19TH.  AFTERNOON APPTS.    Additional notes: PATIENT LAST IN OFFICE ON 5-9-23; NEEDS FOLLOW UP APPOINTMENT. PLEASE REACH OUT TO SCHEDULE

## 2024-01-22 ENCOUNTER — OFFICE VISIT (OUTPATIENT)
Dept: CARDIOLOGY | Facility: CLINIC | Age: 74
End: 2024-01-22
Payer: MEDICARE

## 2024-01-22 VITALS
TEMPERATURE: 98 F | HEART RATE: 89 BPM | RESPIRATION RATE: 18 BRPM | BODY MASS INDEX: 42.22 KG/M2 | HEIGHT: 67 IN | OXYGEN SATURATION: 99 % | DIASTOLIC BLOOD PRESSURE: 80 MMHG | SYSTOLIC BLOOD PRESSURE: 130 MMHG | WEIGHT: 269 LBS

## 2024-01-22 DIAGNOSIS — I50.32 CHRONIC DIASTOLIC CONGESTIVE HEART FAILURE: Primary | Chronic | ICD-10-CM

## 2024-01-22 DIAGNOSIS — I50.32 CHRONIC DIASTOLIC CONGESTIVE HEART FAILURE: Chronic | ICD-10-CM

## 2024-01-22 DIAGNOSIS — E78.2 HYPERLIPIDEMIA, MIXED: ICD-10-CM

## 2024-01-22 DIAGNOSIS — I25.10 CORONARY ARTERY DISEASE INVOLVING NATIVE CORONARY ARTERY OF NATIVE HEART WITHOUT ANGINA PECTORIS: Chronic | ICD-10-CM

## 2024-01-22 DIAGNOSIS — G47.33 OSA (OBSTRUCTIVE SLEEP APNEA): Chronic | ICD-10-CM

## 2024-01-22 DIAGNOSIS — E66.01 CLASS 3 SEVERE OBESITY DUE TO EXCESS CALORIES WITH SERIOUS COMORBIDITY AND BODY MASS INDEX (BMI) OF 40.0 TO 44.9 IN ADULT: ICD-10-CM

## 2024-01-22 DIAGNOSIS — I10 ESSENTIAL HYPERTENSION: Chronic | ICD-10-CM

## 2024-01-22 RX ORDER — CARVEDILOL 25 MG/1
50 TABLET ORAL 2 TIMES DAILY WITH MEALS
Qty: 360 TABLET | Refills: 1 | Status: SHIPPED | OUTPATIENT
Start: 2024-01-22

## 2024-01-22 NOTE — PROGRESS NOTES
[facility]  [facility] CARDIOLOGY  [address] [place] [address]  Dept: [phone]  Dept Fax: [phone]    Sabina Harkins  1950    Follow Up Office Visit Note    History of Present Illness:  Sabina Harkins is a 73 y.o. female who presents to the clinic for Follow-up.Diastolic heart failure- She seems doing a little more SOB, also has mild edema, no CP, BP is 130.80 on Torsemide 20 mg, has not taken the Farxiga, will try again    The following portions of the patient's history were reviewed and updated as appropriate: allergies, current medications, past family history, past medical history, past social history, past surgical history, and problem list.    Medications:  allopurinol  aspirin  atorvastatin  buPROPion XL  carvedilol  clopidogrel  dapagliflozin Propanediol tablet  ezetimibe  fesoterodine fumarate tablet sustained-release 24 hour  losartan  nitroglycerin  pantoprazole  polyethylene glycol  potassium chloride ER tablet controlled-release  promethazine  sertraline  torsemide  Vitamin D capsule    Subjective  Allergies   Allergen Reactions   • Codeine Unknown - High Severity     Pt had surgery and doctor believes she might be allergic to it. Patient is not sure what it does.   • Nabumetone Unknown - High Severity   • Gabapentin Hallucinations        Past Medical History:   Diagnosis Date   • Allergic rhinitis    • Bladder disorder    • Body mass index (BMI) of 40.0 to 44.9 in adult    • CAD (coronary artery disease)    • Chest pain    • Chronic bilateral low back pain with sciatica    • Chronic diastolic heart failure    • Chronic systolic (congestive) heart failure    • Complex dyslipidemia    • Coronary artery disease involving native coronary artery of native heart without angina pectoris    • Depressive disorder    • Diastolic heart failure, stage B    • Dyspnea    • Essential hypertension    • Gait abnormality    • GERD without esophagitis    • Gout    • Heart  disease    • History of colonic polyps    • Hyperglycemia    • Hypokalemia    • Idiopathic 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 tophus    • Increased frequency of urination    • Knee pain    • LAD (linear IgA dermatosis)    • Left ventricular diastolic dysfunction    • Long term (current) use of opiate analgesic    • Low back pain at multiple sites    • Lumbar radiculitis    • Mixed hyperlipidemia    • Morbid obesity    • Multiple vessel coronary artery disease    • Myocardial infarction    • Osteoarthritis of knee    • Other chronic pain    • Overactive bladder    • Pure hypercholesterolemia    • Seasonal allergies    • SOB (shortness of breath)    • Vitamin B12 deficiency    • Vitamin D deficiency        Past Surgical History:   Procedure Laterality Date   • CARDIAC CATHETERIZATION     • COLONOSCOPY     • CORONARY STENT PLACEMENT     • KNEE SURGERY      ARTHROSCOPIC SURGERY RIGHT KNEE   • LUMBAR FUSION     • OTHER SURGICAL HISTORY      RESECTION OF SWEAT GLAND AND ACELOUS SKIN   • TUBAL ABDOMINAL LIGATION Bilateral        Family History   Problem Relation Age of Onset   • Colon cancer Mother    • Stroke Father    • Hypertension Sister    • Cardiomyopathy Brother    • Stroke Other    • Diabetes type II Daughter    • Diabetes type II Son         Social History     Socioeconomic History   • Marital status:    Tobacco Use   • Smoking status: Never   • Smokeless tobacco: Never   Vaping Use   • Vaping Use: Never used   Substance and Sexual Activity   • Alcohol use: Yes   • Drug use: Never   • Sexual activity: Defer       Review of Systems   Constitutional: Negative.    HENT: Negative.     Respiratory: Negative.     Cardiovascular: Negative.    Endocrine: Negative.    Genitourinary: Negative.    Musculoskeletal: Negative.    Skin: Negative.    Allergic/Immunologic: Negative.    Neurological: Negative.    Hematological: Negative.    Psychiatric/Behavioral: Negative.       Cardiovascular  "Procedures    ECHO/MUGA:  STRESS TESTS:   CARDIAC CATH:   DEVICES:   HOLTER:   CT/MRI:   VASCULAR:   CARDIOTHORACIC:     Objective  Vitals:    01/22/24 1306 01/22/24 1331   BP: 146/80 130/80   BP Location: Right arm    Patient Position: Sitting    Cuff Size: Adult    Pulse: 89    Resp: 18    Temp: 98 °F (36.7 °C)    TempSrc: Infrared    SpO2: 99%    Weight: 122 kg (269 lb)    Height: 170.2 cm (67\")    PainSc: 0-No pain      Body mass index is 42.13 kg/m².     Physical Exam  Vitals reviewed.   Constitutional:       Appearance: Healthy appearance. Not in distress.   Neck:      Vascular: No JVR. JVD normal.   Pulmonary:      Effort: Pulmonary effort is normal.      Breath sounds: Normal breath sounds. No wheezing. No rhonchi. No rales.   Chest:      Chest wall: Not tender to palpatation.   Cardiovascular:      PMI at left midclavicular line. Normal rate. Regular rhythm. Normal S1. Normal S2.       Murmurs: There is no murmur.      No gallop.  No click. No rub.   Pulses:     Intact distal pulses.   Edema:     Peripheral edema absent.   Abdominal:      General: Bowel sounds are normal.      Palpations: Abdomen is soft.      Tenderness: There is no abdominal tenderness.   Musculoskeletal: Normal range of motion.         General: No tenderness. Skin:     General: Skin is warm and dry.   Neurological:      General: No focal deficit present.      Mental Status: Alert and oriented to person, place and time.        Diagnostic Data    ECG 12 Lead    Date/Time: 1/22/2024 2:06 PM  Performed by: Mehul Stephens MD    Authorized by: Mehul Stephens MD  Comparison: compared with previous ECG from 1/6/2023  Similar to previous ECG  Rhythm: sinus rhythm  Rate: normal  BPM: 104  QRS axis: normal  Other findings: low voltage and poor R wave progression    Clinical impression: abnormal EKG        Assessment and Plan  Diagnoses and all orders for this visit:    Chronic diastolic congestive heart failure-Has mild SOB worse " and also edema, only on Torsemide, has not been able to afford Farxiga, will try now, that it is generic    Coronary artery disease involving native coronary artery of native heart without angina pectoris- Prior stent to CX 20006 and also 2011, in 2015 cath 1005 LAD, 50% Cx and also RCA, her stress nuclear 2023, was normal    Class 3 severe obesity due to excess calories with serious comorbidity and body mass index (BMI) of 40.0 to 44.9 in adult  -  Hyperlipidemia, mixed-On Lipitor 80 mg and also Zetia, LDL 51    ERICA (obstructive sleep apnea)- On CPAP    Essential hypertension- Bp is 130.80, on Losartan 100 mg, and Coreg 12,5 bid plus torsemide 20 mg     Other orders  -     dapagliflozin Propanediol (Farxiga) 10 MG tablet; Take 10 mg by mouth Daily.         Return in about 3 months (around 4/22/2024) for Recheck with Dr. Stephens.    Mehul Stephens MD  01/22/2024

## 2024-01-29 ENCOUNTER — TELEPHONE (OUTPATIENT)
Dept: CARDIOLOGY | Facility: CLINIC | Age: 74
End: 2024-01-29

## 2024-01-29 NOTE — TELEPHONE ENCOUNTER
Caller: Sabina Harkins    Relationship: Self    Best call back number: [phone]    What is the best time to reach you: ANY    Who are you requesting to speak with (clinical staff, provider,  specific staff member): MARCIO    What was the call regarding: PATIENT IS CALLING BACK TO ADVISE THE FARXIGA HASN'T HAD AN AFFECT YET AND SHE ISN'T SURE IF THAT'S DUE TO ONLY TAKING IT FOR A WEEK. PLEASE CALL THE PATIENT TO DISCUSS.    Is it okay if the provider responds through MyChart: NO

## 2024-03-27 ENCOUNTER — OFFICE VISIT (OUTPATIENT)
Dept: CARDIOLOGY | Facility: CLINIC | Age: 74
End: 2024-03-27
Payer: MEDICARE

## 2024-03-27 VITALS
HEIGHT: 67 IN | RESPIRATION RATE: 16 BRPM | OXYGEN SATURATION: 99 % | SYSTOLIC BLOOD PRESSURE: 132 MMHG | WEIGHT: 267 LBS | BODY MASS INDEX: 41.91 KG/M2 | DIASTOLIC BLOOD PRESSURE: 84 MMHG | HEART RATE: 88 BPM

## 2024-03-27 DIAGNOSIS — I50.32 CHRONIC DIASTOLIC CONGESTIVE HEART FAILURE: Primary | Chronic | ICD-10-CM

## 2024-03-27 DIAGNOSIS — I25.10 CORONARY ARTERY DISEASE INVOLVING NATIVE CORONARY ARTERY OF NATIVE HEART WITHOUT ANGINA PECTORIS: Chronic | ICD-10-CM

## 2024-03-27 DIAGNOSIS — E78.2 HYPERLIPIDEMIA, MIXED: ICD-10-CM

## 2024-03-27 DIAGNOSIS — G47.33 OSA (OBSTRUCTIVE SLEEP APNEA): Chronic | ICD-10-CM

## 2024-03-27 DIAGNOSIS — E66.01 CLASS 3 SEVERE OBESITY DUE TO EXCESS CALORIES WITH SERIOUS COMORBIDITY AND BODY MASS INDEX (BMI) OF 40.0 TO 44.9 IN ADULT: ICD-10-CM

## 2024-03-27 DIAGNOSIS — I10 ESSENTIAL HYPERTENSION: Chronic | ICD-10-CM

## 2024-03-27 RX ORDER — DAPAGLIFLOZIN 10 MG/1
10 TABLET, FILM COATED ORAL DAILY
Qty: 90 TABLET | Refills: 2 | Status: SHIPPED | OUTPATIENT
Start: 2024-03-27

## 2024-03-27 NOTE — PROGRESS NOTES
[facility]  [facility] CARDIOLOGY  [address] [place] [address]  Dept: [phone]  Dept Fax: [phone]    Sabina Harkins  1950    Follow Up Office Visit Note    History of Present Illness:  Sabina Harkins is a 73 y.o. female who presents to the clinic for Follow-up.Diastolic heart failure- She feels weak, tired and SOB on walking or activities, no really chest pain, no edema, she has not been able to take the Farxiga, no sure the reason, will give her samples, will see her back in 2 months, she does have CAD and has prior stents, last stress test in 2022 was normal, we might repeat the stress test or needs cath if not better with the Farxiga, also she states feels weak and goes to sleep quicjly and long time, has ERICA and not wearing CPAP    The following portions of the patient's history were reviewed and updated as appropriate: allergies, current medications, past family history, past medical history, past social history, past surgical history, and problem list.    Medications:  allopurinol  aspirin  atorvastatin  buPROPion XL  carvedilol  clopidogrel  dapagliflozin Propanediol tablet  ezetimibe  fesoterodine fumarate tablet sustained-release 24 hour  losartan  nitroglycerin  pantoprazole  polyethylene glycol  potassium chloride ER tablet controlled-release  promethazine  sertraline  torsemide  Vitamin D capsule    Subjective  Allergies   Allergen Reactions   • Codeine Unknown - High Severity     Pt had surgery and doctor believes she might be allergic to it. Patient is not sure what it does.   • Nabumetone Unknown - High Severity   • Gabapentin Hallucinations        Past Medical History:   Diagnosis Date   • Allergic rhinitis    • Bladder disorder    • Body mass index (BMI) of 40.0 to 44.9 in adult    • CAD (coronary artery disease)    • Chest pain    • Chronic bilateral low back pain with sciatica    • Chronic diastolic heart failure    • Chronic systolic (congestive)  heart failure    • Complex dyslipidemia    • Coronary artery disease involving native coronary artery of native heart without angina pectoris    • Depressive disorder    • Diastolic heart failure, stage B    • Dyspnea    • Essential hypertension    • Gait abnormality    • GERD without esophagitis    • Gout    • Heart disease    • History of colonic polyps    • Hyperglycemia    • Hypokalemia    • Idiopathic 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 tophus    • Increased frequency of urination    • Knee pain    • LAD (linear IgA dermatosis)    • Left ventricular diastolic dysfunction    • Long term (current) use of opiate analgesic    • Low back pain at multiple sites    • Lumbar radiculitis    • Mixed hyperlipidemia    • Morbid obesity    • Multiple vessel coronary artery disease    • Myocardial infarction    • Osteoarthritis of knee    • Other chronic pain    • Overactive bladder    • Pure hypercholesterolemia    • Seasonal allergies    • SOB (shortness of breath)    • Vitamin B12 deficiency    • Vitamin D deficiency        Past Surgical History:   Procedure Laterality Date   • CARDIAC CATHETERIZATION     • COLONOSCOPY     • CORONARY STENT PLACEMENT     • KNEE SURGERY      ARTHROSCOPIC SURGERY RIGHT KNEE   • LUMBAR FUSION     • OTHER SURGICAL HISTORY      RESECTION OF SWEAT GLAND AND ACELOUS SKIN   • TUBAL ABDOMINAL LIGATION Bilateral        Family History   Problem Relation Age of Onset   • Colon cancer Mother    • Stroke Father    • Hypertension Sister    • Cardiomyopathy Brother    • Stroke Other    • Diabetes type II Daughter    • Diabetes type II Son         Social History     Socioeconomic History   • Marital status:    Tobacco Use   • Smoking status: Never   • Smokeless tobacco: Never   Vaping Use   • Vaping status: Never Used   Substance and Sexual Activity   • Alcohol use: Yes   • Drug use: Never   • Sexual activity: Defer       Review of Systems   Constitutional:  Positive for fatigue.   HENT:  "Negative.     Respiratory:  Positive for shortness of breath.    Cardiovascular: Negative.    Endocrine: Negative.    Genitourinary: Negative.    Musculoskeletal: Negative.    Skin: Negative.    Allergic/Immunologic: Negative.    Neurological: Negative.    Hematological: Negative.    Psychiatric/Behavioral: Negative.       Cardiovascular Procedures    ECHO/MUGA:  STRESS TESTS:   CARDIAC CATH:   DEVICES:   HOLTER:   CT/MRI:   VASCULAR:   CARDIOTHORACIC:     Objective  Vitals:    03/27/24 1416   BP: 132/84   BP Location: Right arm   Patient Position: Lying   Cuff Size: Adult   Pulse: 88   Resp: 16   SpO2: 99%   Weight: 121 kg (267 lb)   Height: 170.2 cm (67\")   PainSc: 0-No pain     Body mass index is 41.82 kg/m².     Physical Exam  Constitutional:       Appearance: Healthy appearance. Not in distress.   Neck:      Vascular: No JVR. JVD normal.   Pulmonary:      Effort: Pulmonary effort is normal.      Breath sounds: Normal breath sounds. No wheezing. No rhonchi. No rales.   Chest:      Chest wall: Not tender to palpatation.   Cardiovascular:      PMI at left midclavicular line. Normal rate. Regular rhythm. Normal S1. Normal S2.       Murmurs: There is no murmur.      No gallop.  No click. No rub.   Pulses:     Intact distal pulses.   Edema:     Peripheral edema absent.   Abdominal:      General: Bowel sounds are normal.      Palpations: Abdomen is soft.      Tenderness: There is no abdominal tenderness.   Musculoskeletal: Normal range of motion.         General: No tenderness. Skin:     General: Skin is warm and dry.   Neurological:      General: No focal deficit present.      Mental Status: Alert and oriented to person, place and time.        Diagnostic Data    ECG 12 Lead    Date/Time: 3/27/2024 2:51 PM  Performed by: Mehul Stephens MD    Authorized by: Mehul Stephens MD  Comparison: compared with previous ECG from 1/12/2024  Similar to previous ECG  Rhythm: sinus rhythm  Rate: normal  BPM: 82  Q " waves: III and aVF    Other findings: low voltage    Clinical impression: abnormal EKG and myocardial infarction      Assessment and Plan  Diagnoses and all orders for this visit:    Chronic diastolic congestive heart failure-- on torsemide 20 mg and has not been able to take Farxiga, will try again, if no better, her complaints of fatigue, SOB, can be ischemia,    Coronary artery disease involving native coronary artery of native heart without angina pectoris=on ASA and Plavix, no chest pain, EKG no changes    Essential hypertension- BP is 125.80 on losartan 100 mg, Coreg 12,5 bid and also Torsemide 20 mg    Hyperlipidemia, mixed- on Lipitor 80 mg and  also Zetia, we need a Lipid     ERICA (obstructive sleep apnea)- no using CPAP    Class 3 severe obesity due to excess calories with serious comorbidity and body mass index (BMI) of 40.0 to 44.9 in adult    Other orders  -     dapagliflozin Propanediol (Farxiga) 10 MG tablet; Take 10 mg by mouth Daily.         Return in about 2 months (around 5/27/2024) for Recheck with Dr. Stephens.    Mehul Stephens MD  03/27/2024

## 2024-03-28 ENCOUNTER — TELEPHONE (OUTPATIENT)
Dept: CARDIOLOGY | Facility: CLINIC | Age: 74
End: 2024-03-28
Payer: MEDICARE

## 2024-03-28 NOTE — TELEPHONE ENCOUNTER
Phone call to pt and left detailed message we would do a Pa and let her know the outcome when we get it completed.

## 2024-03-28 NOTE — TELEPHONE ENCOUNTER
Farxiga - PT WANTED TO UPDATE US, INSURANCE WOULDN'T TAKE GENERIC AND IS NOT WANTING TO PAY THE COST FOR REGULAR

## 2024-03-29 NOTE — TELEPHONE ENCOUNTER
Sabina Ford (Key: [id number]) - [id number]  Status: PA Response - Approved. Spoke with pt and told her to please go to the pharm.

## 2024-04-04 ENCOUNTER — TELEPHONE (OUTPATIENT)
Dept: CARDIOLOGY | Facility: CLINIC | Age: 74
End: 2024-04-04
Payer: MEDICARE

## 2024-04-04 NOTE — TELEPHONE ENCOUNTER
----- Message from Mehul Stephens MD sent at 4/4/2024  2:56 PM EDT -----  Regarding: RE: Stopping  Clopidogrel  That is ok, to hold the plavix  ----- Message -----  From: Mariana Saunders RN  Sent: 4/4/2024   1:15 PM EDT  To: Mehul Stephens MD  Subject: Stopping  Clopidogrel                            She is having a cystoscopy with Botox on 4/25 and they want to stop her Clopidogrel 5 days prior to procedure.      History of Present Illness:  Sabina Harkins is a 73 y.o. female who presents to the clinic for Follow-up.Diastolic heart failure- She feels weak, tired and SOB on walking or activities, no really chest pain, no edema, she has not been able to take the Farxiga, no sure the reason, will give her samples, will see her back in 2 months, she does have CAD and has prior stents, last stress test in 2022 was normal, we might repeat the stress test or needs cath if not better with the Farxiga, also she states feels weak and goes to sleep quicjly and long time, has ERICA and not wearing CPAP    Diagnostic Data     ECG 12 Lead     Date/Time: 3/27/2024 2:51 PM  Performed by: Mehul Stephens MD     Authorized by: Mehul Stephens MD  Comparison: compared with previous ECG from 1/12/2024  Similar to previous ECG  Rhythm: sinus rhythm  Rate: normal  BPM: 82  Q waves: III and aVF     Other findings: low voltage     Clinical impression: abnormal EKG and myocardial infarction        Assessment and Plan  Diagnoses and all orders for this visit:     Chronic diastolic congestive heart failure-- on torsemide 20 mg and has not been able to take Farxiga, will try again, if no better, her complaints of fatigue, SOB, can be ischemia,     Coronary artery disease involving native coronary artery of native heart without angina pectoris=on ASA and Plavix, no chest pain, EKG no changes     Essential hypertension- BP is 125.80 on losartan 100 mg, Coreg 12,5 bid and also Torsemide 20 mg     Hyperlipidemia, mixed- on  Lipitor 80 mg and  also Zetia, we need a Lipid      ERICA (obstructive sleep apnea)- no using CPAP     Class 3 severe obesity due to excess calories with serious comorbidity and body mass index (BMI) of 40.0 to 44.9 in adult     Other orders  -     dapagliflozin Propanediol (Farxiga) 10 MG tablet; Take 10 mg by mouth Daily.           Return in about 2 months (around 5/27/2024) for Recheck with Dr. Stephens.     Mehul Stephens MD  03/27/2024

## 2024-04-04 NOTE — TELEPHONE ENCOUNTER
Spoke with Ms. Harkins and she understands to stop her Plavix 5 days prior to her cystoscopy and remain on her aspirin.

## 2024-04-04 NOTE — TELEPHONE ENCOUNTER
Caller: [facility].    Relationship: Provider    Best call back number: [phone]     What is the best time to reach you: 8-11 OR 1-3 OR FAX REQUEST WHEN FINISHED     What was the call regarding: SENT  A FAX ON 4/2 FOR THE PT TO STOP BLOOD THINNER, WANTED TO MAKE SURE WE GOT THIS. PLEASE SEND BACK ASAP.

## 2024-04-10 ENCOUNTER — TELEPHONE (OUTPATIENT)
Dept: CARDIOLOGY | Facility: CLINIC | Age: 74
End: 2024-04-10
Payer: MEDICARE

## 2024-04-10 NOTE — TELEPHONE ENCOUNTER
No PA required per [employer] contacted pharmacy and cost is $1000 for 90 day, patient is in the coverage gap and I left the pt a voice mail of this as well.

## 2024-04-10 NOTE — TELEPHONE ENCOUNTER
Caller: Sabina Harkins    Relationship to patient: Self    Best call back number: [phone]    Patient is needing: TO LET YOU KNOW THE RX IS FARXIGA IS $1,000. THANK YOU

## 2024-05-16 ENCOUNTER — CLINICAL SUPPORT (OUTPATIENT)
Dept: CARDIOLOGY | Facility: CLINIC | Age: 74
End: 2024-05-16
Payer: MEDICARE

## 2024-05-16 DIAGNOSIS — E78.2 HYPERLIPIDEMIA, MIXED: ICD-10-CM

## 2024-05-16 DIAGNOSIS — I25.10 CORONARY ARTERY DISEASE INVOLVING NATIVE CORONARY ARTERY OF NATIVE HEART WITHOUT ANGINA PECTORIS: Primary | Chronic | ICD-10-CM

## 2024-05-16 DIAGNOSIS — I50.32 CHRONIC DIASTOLIC CONGESTIVE HEART FAILURE: Chronic | ICD-10-CM

## 2024-05-16 DIAGNOSIS — I10 ESSENTIAL HYPERTENSION: Chronic | ICD-10-CM

## 2024-05-16 PROCEDURE — 36415 COLL VENOUS BLD VENIPUNCTURE: CPT | Performed by: INTERNAL MEDICINE

## 2024-05-16 NOTE — PROGRESS NOTES
Venipuncture Blood Specimen Collection  Venipuncture performed in clinic by Amparo Granger with good hemostasis. Patient tolerated the procedure well without complications.   05/16/24   Amparo Granger

## 2024-05-17 LAB
ALBUMIN SERPL-MCNC: 3.9 G/DL (ref 3.8–4.8)
ALBUMIN/GLOB SERPL: 1.9 {RATIO} (ref 1.2–2.2)
ALP SERPL-CCNC: 171 IU/L (ref 44–121)
ALT SERPL-CCNC: 18 IU/L (ref 0–32)
AST SERPL-CCNC: 18 IU/L (ref 0–40)
BASOPHILS # BLD AUTO: 0 X10E3/UL (ref 0–0.2)
BASOPHILS NFR BLD AUTO: 0 %
BILIRUB SERPL-MCNC: 1.1 MG/DL (ref 0–1.2)
BUN SERPL-MCNC: 17 MG/DL (ref 8–27)
BUN/CREAT SERPL: 14 (ref 12–28)
CALCIUM SERPL-MCNC: 8.8 MG/DL (ref 8.7–10.3)
CHLORIDE SERPL-SCNC: 109 MMOL/L (ref 96–106)
CHOLEST SERPL-MCNC: 136 MG/DL (ref 100–199)
CK SERPL-CCNC: 61 U/L (ref 32–182)
CO2 SERPL-SCNC: 19 MMOL/L (ref 20–29)
CREAT SERPL-MCNC: 1.2 MG/DL (ref 0.57–1)
EGFRCR SERPLBLD CKD-EPI 2021: 48 ML/MIN/1.73
EOSINOPHIL # BLD AUTO: 0.1 X10E3/UL (ref 0–0.4)
EOSINOPHIL NFR BLD AUTO: 1 %
ERYTHROCYTE [DISTWIDTH] IN BLOOD BY AUTOMATED COUNT: 13 % (ref 11.7–15.4)
GLOBULIN SER CALC-MCNC: 2.1 G/DL (ref 1.5–4.5)
GLUCOSE SERPL-MCNC: 99 MG/DL (ref 70–99)
HCT VFR BLD AUTO: 47.8 % (ref 34–46.6)
HDLC SERPL-MCNC: 60 MG/DL
HGB BLD-MCNC: 15.3 G/DL (ref 11.1–15.9)
IMM GRANULOCYTES # BLD AUTO: 0.1 X10E3/UL (ref 0–0.1)
IMM GRANULOCYTES NFR BLD AUTO: 1 %
LDLC SERPL CALC-MCNC: 62 MG/DL (ref 0–99)
LPA SERPL-SCNC: 219 NMOL/L
LYMPHOCYTES # BLD AUTO: 2.3 X10E3/UL (ref 0.7–3.1)
LYMPHOCYTES NFR BLD AUTO: 24 %
MCH RBC QN AUTO: 29.7 PG (ref 26.6–33)
MCHC RBC AUTO-ENTMCNC: 32 G/DL (ref 31.5–35.7)
MCV RBC AUTO: 93 FL (ref 79–97)
MONOCYTES # BLD AUTO: 0.7 X10E3/UL (ref 0.1–0.9)
MONOCYTES NFR BLD AUTO: 7 %
NEUTROPHILS # BLD AUTO: 6.6 X10E3/UL (ref 1.4–7)
NEUTROPHILS NFR BLD AUTO: 67 %
PLATELET # BLD AUTO: 213 X10E3/UL (ref 150–450)
POTASSIUM SERPL-SCNC: 4.5 MMOL/L (ref 3.5–5.2)
PROT SERPL-MCNC: 6 G/DL (ref 6–8.5)
RBC # BLD AUTO: 5.16 X10E6/UL (ref 3.77–5.28)
SODIUM SERPL-SCNC: 143 MMOL/L (ref 134–144)
TRIGL SERPL-MCNC: 66 MG/DL (ref 0–149)
VLDLC SERPL CALC-MCNC: 14 MG/DL (ref 5–40)
WBC # BLD AUTO: 9.8 X10E3/UL (ref 3.4–10.8)

## 2024-05-19 LAB — APO B SERPL-MCNC: 60 MG/DL

## 2024-05-24 ENCOUNTER — OFFICE VISIT (OUTPATIENT)
Dept: CARDIOLOGY | Facility: CLINIC | Age: 74
End: 2024-05-24
Payer: MEDICARE

## 2024-05-24 VITALS
WEIGHT: 257 LBS | SYSTOLIC BLOOD PRESSURE: 114 MMHG | DIASTOLIC BLOOD PRESSURE: 66 MMHG | HEART RATE: 62 BPM | RESPIRATION RATE: 16 BRPM | HEIGHT: 67 IN | BODY MASS INDEX: 40.34 KG/M2 | OXYGEN SATURATION: 99 %

## 2024-05-24 DIAGNOSIS — I25.10 CORONARY ARTERY DISEASE INVOLVING NATIVE CORONARY ARTERY OF NATIVE HEART WITHOUT ANGINA PECTORIS: Chronic | ICD-10-CM

## 2024-05-24 DIAGNOSIS — G47.33 OSA (OBSTRUCTIVE SLEEP APNEA): Chronic | ICD-10-CM

## 2024-05-24 DIAGNOSIS — I50.32 CHRONIC DIASTOLIC CONGESTIVE HEART FAILURE: Primary | Chronic | ICD-10-CM

## 2024-05-24 DIAGNOSIS — E66.01 CLASS 3 SEVERE OBESITY DUE TO EXCESS CALORIES WITH SERIOUS COMORBIDITY AND BODY MASS INDEX (BMI) OF 40.0 TO 44.9 IN ADULT: ICD-10-CM

## 2024-05-24 DIAGNOSIS — E78.2 HYPERLIPIDEMIA, MIXED: ICD-10-CM

## 2024-05-24 DIAGNOSIS — I10 ESSENTIAL HYPERTENSION: Chronic | ICD-10-CM

## 2024-05-24 RX ORDER — FUROSEMIDE 20 MG/1
20 TABLET ORAL 2 TIMES DAILY
COMMUNITY
End: 2024-05-24 | Stop reason: ALTCHOICE

## 2024-05-24 RX ORDER — CLOPIDOGREL BISULFATE 75 MG/1
75 TABLET ORAL DAILY
Qty: 90 TABLET | Refills: 1 | Status: SHIPPED | OUTPATIENT
Start: 2024-05-24

## 2024-05-24 RX ORDER — LOSARTAN POTASSIUM 50 MG/1
50 TABLET ORAL DAILY
Qty: 90 TABLET | Refills: 3 | Status: SHIPPED | OUTPATIENT
Start: 2024-05-24

## 2024-05-25 NOTE — PROGRESS NOTES
[facility]  [facility] CARDIOLOGY  [address] [place] [address]  Dept: [phone]  Dept Fax: [phone]    Sabina Harkins  1950    Follow Up Office Visit Note    History of Present Illness:  Sabina Harkins is a 74 y.o. female who presents to the clinic for Follow-up.Diastolic heart failure- She has lost 10 pounds, but feels weak and tired, no energy she has CAD and cath in 2015 has 100% LAD and 50% RCA and  50% CX, her Ef normalized, she has prior stents,     last stress test was normal in 2022, however she feels very weak and I suspect she likely has severe disease, she also has prior stent to RCA in 2006 she has not been able to take Farxiga due to cost, on torsemide 20 mg, her Bp is 100.60, will lower Losartan to 50 mg from 100, will keep Coreg 12,5 bid , EKG sinus with low voltage and old inferior wall MI, although she does not have chest pain, she has been complaining of weakness and fatigue, ,    The following portions of the patient's history were reviewed and updated as appropriate: allergies, current medications, past family history, past medical history, past social history, past surgical history, and problem list.    Medications:  allopurinol  aspirin  atorvastatin  buPROPion XL  carvedilol  clopidogrel  dapagliflozin Propanediol tablet  Evolocumab solution auto-injector  ezetimibe  fesoterodine fumarate tablet sustained-release 24 hour  losartan  nitroglycerin  pantoprazole  polyethylene glycol  potassium chloride ER tablet controlled-release  promethazine  sertraline  torsemide  Vitamin D capsule    Subjective  Allergies   Allergen Reactions    Codeine Unknown - High Severity     Pt had surgery and doctor believes she might be allergic to it. Patient is not sure what it does.    Nabumetone Unknown - High Severity    Gabapentin Hallucinations        Past Medical History:   Diagnosis Date    Allergic rhinitis     Bladder disorder     Body mass index (BMI) of 40.0  to 44.9 in adult     CAD (coronary artery disease)     Chest pain     Chronic bilateral low back pain with sciatica     Chronic diastolic heart failure     Chronic systolic (congestive) heart failure     Complex dyslipidemia     Coronary artery disease involving native coronary artery of native heart without angina pectoris     Depressive disorder     Diastolic heart failure, stage B     Dyspnea     Essential hypertension     Gait abnormality     GERD without esophagitis     Gout     Heart disease     History of colonic polyps     Hyperglycemia     Hypokalemia     Idiopathic 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 tophus     Increased frequency of urination     Knee pain     LAD (linear IgA dermatosis)     Left ventricular diastolic dysfunction     Long term (current) use of opiate analgesic     Low back pain at multiple sites     Lumbar radiculitis     Mixed hyperlipidemia     Morbid obesity     Multiple vessel coronary artery disease     Myocardial infarction     Osteoarthritis of knee     Other chronic pain     Overactive bladder     Pure hypercholesterolemia     Seasonal allergies     SOB (shortness of breath)     Vitamin B12 deficiency     Vitamin D deficiency        Past Surgical History:   Procedure Laterality Date    CARDIAC CATHETERIZATION      COLONOSCOPY      CORONARY STENT PLACEMENT      KNEE SURGERY      ARTHROSCOPIC SURGERY RIGHT KNEE    LUMBAR FUSION      OTHER SURGICAL HISTORY      RESECTION OF SWEAT GLAND AND ACELOUS SKIN    TUBAL ABDOMINAL LIGATION Bilateral        Family History   Problem Relation Age of Onset    Colon cancer Mother     Stroke Father     Hypertension Sister     Cardiomyopathy Brother     Stroke Other     Diabetes type II Daughter     Diabetes type II Son         Social History     Socioeconomic History    Marital status:    Tobacco Use    Smoking status: Never    Smokeless tobacco: Never   Vaping Use    Vaping status: Never Used   Substance and Sexual Activity    Alcohol  "use: Yes    Drug use: Never    Sexual activity: Defer       Review of Systems   Constitutional:  Positive for fatigue.   HENT: Negative.     Respiratory:  Positive for shortness of breath.    Cardiovascular: Negative.    Endocrine: Negative.    Genitourinary: Negative.    Musculoskeletal: Negative.    Skin: Negative.    Allergic/Immunologic: Negative.    Neurological:  Positive for weakness.   Hematological: Negative.    Psychiatric/Behavioral: Negative.         Cardiovascular Procedures    ECHO/MUGA:  STRESS TESTS:   CARDIAC CATH:   DEVICES:   HOLTER:   CT/MRI:   VASCULAR:   CARDIOTHORACIC:     Objective  Vitals:    05/24/24 1422   BP: 114/66   BP Location: Right arm   Patient Position: Lying   Cuff Size: Adult   Pulse: 62   Resp: 16   SpO2: 99%   Weight: 117 kg (257 lb)   Height: 170.2 cm (67\")   PainSc: 0-No pain     Body mass index is 40.25 kg/m².     Physical Exam  Constitutional:       Appearance: Healthy appearance. Not in distress.   Neck:      Vascular: No JVR. JVD normal.   Pulmonary:      Effort: Pulmonary effort is normal.      Breath sounds: Normal breath sounds. No wheezing. No rhonchi. No rales.   Chest:      Chest wall: Not tender to palpatation.   Cardiovascular:      PMI at left midclavicular line. Normal rate. Regular rhythm. Normal S1. Normal S2.       Murmurs: There is no murmur.      No gallop.  No click. No rub.   Pulses:     Intact distal pulses.   Edema:     Peripheral edema absent.   Abdominal:      General: Bowel sounds are normal.      Palpations: Abdomen is soft.      Tenderness: There is no abdominal tenderness.   Musculoskeletal: Normal range of motion.         General: No tenderness. Skin:     General: Skin is warm and dry.   Neurological:      General: No focal deficit present.      Mental Status: Alert and oriented to person, place and time.        Diagnostic Data    ECG 12 Lead    Date/Time: 5/24/2024 8:12 PM  Performed by: Mehul Stephens MD    Authorized by: Mehul Stephens " MD Bennett  Comparison: compared with previous ECG from 3/27/2024  Similar to previous ECG  Rhythm: sinus rhythm  Rate: normal  BPM: 90  Q waves: III and aVF    QRS axis: normal    Clinical impression: abnormal EKG        Assessment and Plan  Diagnoses and all orders for this visit:    Chronic diastolic congestive heart failure-No edema, but fatigue and tiredness, just on Torsemide, has not been able to take Farxiga due to cost,  -     clopidogrel (PLAVIX) 75 MG tablet; Take 1 tablet by mouth Daily.  -     losartan (COZAAR) 50 MG tablet; Take 1 tablet by mouth Daily. for blood pressure  -     Case Request Cath Lab: Left Heart Cath    Coronary artery disease involving native coronary artery of native heart without angina pectoris- has stent to RCA in 2006 and cath after STEMI in 2015 100% distal LAD and 50% RCA and CX also 50%,     the stress nuclear was normal in 2022, she feels very weak and I think we need to know again the coronary anatomy, will get a cardiac cath as medical treatment has failed , she is on ASA and Plavix,  -     clopidogrel (PLAVIX) 75 MG tablet; Take 1 tablet by mouth Daily.  -     losartan (COZAAR) 50 MG tablet; Take 1 tablet by mouth Daily. for blood pressure  -     Case Request Cath Lab: Left Heart Cath    Essential hypertension- Bp is 100.60, will lower Losartan to 50 mg and keep Coreg 12,5 bid plus Torsemide 20 mg  -     losartan (COZAAR) 50 MG tablet; Take 1 tablet by mouth Daily. for blood pressure    Hyperlipidemia, mixed- On Lipitor and Zetia, has LPA elevated, will get Repatha     ERICA (obstructive sleep apnea)- On CPAP        Other orders  -     Discontinue: furosemide (LASIX) 20 MG tablet; Take 1 tablet by mouth 2 (Two) Times a Day.  -     Evolocumab (REPATHA) solution auto-injector SureClick injection; Inject 1 mL under the skin into the appropriate area as directed Every 14 (Fourteen) Days.         Return in about 1 month (around 6/24/2024) for Recheck with Dr. Stephens.    Mehul  MD Kat  05/24/2024

## 2024-05-25 NOTE — H&P (VIEW-ONLY)
[facility] [place]  [facility] CARDIOLOGY  [address] [place] [address]  Dept: [phone]  Dept Fax: [phone]    Sabina Harkins  1950    Follow Up Office Visit Note    History of Present Illness:  Sabina Harkins is a 74 y.o. female who presents to the clinic for Follow-up.Diastolic heart failure- She has lost 10 pounds, but feels weak and tired, no energy she has CAD and cath in 2015 has 100% LAD and 50% RCA and  50% CX, her Ef normalized, she has prior stents,     last stress test was normal in 2022, however she feels very weak and I suspect she likely has severe disease, she also has prior stent to RCA in 2006 she has not been able to take Farxiga due to cost, on torsemide 20 mg, her Bp is 100.60, will lower Losartan to 50 mg from 100, will keep Coreg 12,5 bid , EKG sinus with low voltage and old inferior wall MI, although she does not have chest pain, she has been complaining of weakness and fatigue, ,    The following portions of the patient's history were reviewed and updated as appropriate: allergies, current medications, past family history, past medical history, past social history, past surgical history, and problem list.    Medications:  allopurinol  aspirin  atorvastatin  buPROPion XL  carvedilol  clopidogrel  dapagliflozin Propanediol tablet  Evolocumab solution auto-injector  ezetimibe  fesoterodine fumarate tablet sustained-release 24 hour  losartan  nitroglycerin  pantoprazole  polyethylene glycol  potassium chloride ER tablet controlled-release  promethazine  sertraline  torsemide  Vitamin D capsule    Subjective  Allergies   Allergen Reactions    Codeine Unknown - High Severity     Pt had surgery and doctor believes she might be allergic to it. Patient is not sure what it does.    Nabumetone Unknown - High Severity    Gabapentin Hallucinations        Past Medical History:   Diagnosis Date    Allergic rhinitis     Bladder disorder     Body mass index (BMI) of 40.0  to 44.9 in adult     CAD (coronary artery disease)     Chest pain     Chronic bilateral low back pain with sciatica     Chronic diastolic heart failure     Chronic systolic (congestive) heart failure     Complex dyslipidemia     Coronary artery disease involving native coronary artery of native heart without angina pectoris     Depressive disorder     Diastolic heart failure, stage B     Dyspnea     Essential hypertension     Gait abnormality     GERD without esophagitis     Gout     Heart disease     History of colonic polyps     Hyperglycemia     Hypokalemia     Idiopathic 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 tophus     Increased frequency of urination     Knee pain     LAD (linear IgA dermatosis)     Left ventricular diastolic dysfunction     Long term (current) use of opiate analgesic     Low back pain at multiple sites     Lumbar radiculitis     Mixed hyperlipidemia     Morbid obesity     Multiple vessel coronary artery disease     Myocardial infarction     Osteoarthritis of knee     Other chronic pain     Overactive bladder     Pure hypercholesterolemia     Seasonal allergies     SOB (shortness of breath)     Vitamin B12 deficiency     Vitamin D deficiency        Past Surgical History:   Procedure Laterality Date    CARDIAC CATHETERIZATION      COLONOSCOPY      CORONARY STENT PLACEMENT      KNEE SURGERY      ARTHROSCOPIC SURGERY RIGHT KNEE    LUMBAR FUSION      OTHER SURGICAL HISTORY      RESECTION OF SWEAT GLAND AND ACELOUS SKIN    TUBAL ABDOMINAL LIGATION Bilateral        Family History   Problem Relation Age of Onset    Colon cancer Mother     Stroke Father     Hypertension Sister     Cardiomyopathy Brother     Stroke Other     Diabetes type II Daughter     Diabetes type II Son         Social History     Socioeconomic History    Marital status:    Tobacco Use    Smoking status: Never    Smokeless tobacco: Never   Vaping Use    Vaping status: Never Used   Substance and Sexual Activity    Alcohol  "use: Yes    Drug use: Never    Sexual activity: Defer       Review of Systems   Constitutional:  Positive for fatigue.   HENT: Negative.     Respiratory:  Positive for shortness of breath.    Cardiovascular: Negative.    Endocrine: Negative.    Genitourinary: Negative.    Musculoskeletal: Negative.    Skin: Negative.    Allergic/Immunologic: Negative.    Neurological:  Positive for weakness.   Hematological: Negative.    Psychiatric/Behavioral: Negative.         Cardiovascular Procedures    ECHO/MUGA:  STRESS TESTS:   CARDIAC CATH:   DEVICES:   HOLTER:   CT/MRI:   VASCULAR:   CARDIOTHORACIC:     Objective  Vitals:    05/24/24 1422   BP: 114/66   BP Location: Right arm   Patient Position: Lying   Cuff Size: Adult   Pulse: 62   Resp: 16   SpO2: 99%   Weight: 117 kg (257 lb)   Height: 170.2 cm (67\")   PainSc: 0-No pain     Body mass index is 40.25 kg/m².     Physical Exam  Constitutional:       Appearance: Healthy appearance. Not in distress.   Neck:      Vascular: No JVR. JVD normal.   Pulmonary:      Effort: Pulmonary effort is normal.      Breath sounds: Normal breath sounds. No wheezing. No rhonchi. No rales.   Chest:      Chest wall: Not tender to palpatation.   Cardiovascular:      PMI at left midclavicular line. Normal rate. Regular rhythm. Normal S1. Normal S2.       Murmurs: There is no murmur.      No gallop.  No click. No rub.   Pulses:     Intact distal pulses.   Edema:     Peripheral edema absent.   Abdominal:      General: Bowel sounds are normal.      Palpations: Abdomen is soft.      Tenderness: There is no abdominal tenderness.   Musculoskeletal: Normal range of motion.         General: No tenderness. Skin:     General: Skin is warm and dry.   Neurological:      General: No focal deficit present.      Mental Status: Alert and oriented to person, place and time.        Diagnostic Data    ECG 12 Lead    Date/Time: 5/24/2024 8:12 PM  Performed by: Mehul Stephens MD    Authorized by: Mehul Stephens " MD Bennett  Comparison: compared with previous ECG from 3/27/2024  Similar to previous ECG  Rhythm: sinus rhythm  Rate: normal  BPM: 90  Q waves: III and aVF    QRS axis: normal    Clinical impression: abnormal EKG        Assessment and Plan  Diagnoses and all orders for this visit:    Chronic diastolic congestive heart failure-No edema, but fatigue and tiredness, just on Torsemide, has not been able to take Farxiga due to cost,  -     clopidogrel (PLAVIX) 75 MG tablet; Take 1 tablet by mouth Daily.  -     losartan (COZAAR) 50 MG tablet; Take 1 tablet by mouth Daily. for blood pressure  -     Case Request Cath Lab: Left Heart Cath    Coronary artery disease involving native coronary artery of native heart without angina pectoris- has stent to RCA in 2006 and cath after STEMI in 2015 100% distal LAD and 50% RCA and CX also 50%,     the stress nuclear was normal in 2022, she feels very weak and I think we need to know again the coronary anatomy, will get a cardiac cath as medical treatment has failed , she is on ASA and Plavix,  -     clopidogrel (PLAVIX) 75 MG tablet; Take 1 tablet by mouth Daily.  -     losartan (COZAAR) 50 MG tablet; Take 1 tablet by mouth Daily. for blood pressure  -     Case Request Cath Lab: Left Heart Cath    Essential hypertension- Bp is 100.60, will lower Losartan to 50 mg and keep Coreg 12,5 bid plus Torsemide 20 mg  -     losartan (COZAAR) 50 MG tablet; Take 1 tablet by mouth Daily. for blood pressure    Hyperlipidemia, mixed- On Lipitor and Zetia, has LPA elevated, will get Repatha     ERICA (obstructive sleep apnea)- On CPAP        Other orders  -     Discontinue: furosemide (LASIX) 20 MG tablet; Take 1 tablet by mouth 2 (Two) Times a Day.  -     Evolocumab (REPATHA) solution auto-injector SureClick injection; Inject 1 mL under the skin into the appropriate area as directed Every 14 (Fourteen) Days.         Return in about 1 month (around 6/24/2024) for Recheck with Dr. Stephens.    Mehul  MD Kat  05/24/2024

## 2024-05-31 ENCOUNTER — PREP FOR SURGERY (OUTPATIENT)
Dept: OTHER | Facility: HOSPITAL | Age: 74
End: 2024-05-31
Payer: MEDICARE

## 2024-05-31 RX ORDER — SODIUM CHLORIDE 0.9 % (FLUSH) 0.9 %
10 SYRINGE (ML) INJECTION EVERY 12 HOURS SCHEDULED
OUTPATIENT
Start: 2024-05-31

## 2024-05-31 RX ORDER — ASPIRIN 81 MG/1
324 TABLET, CHEWABLE ORAL ONCE
OUTPATIENT
Start: 2024-05-31 | End: 2024-05-31

## 2024-05-31 RX ORDER — ACETAMINOPHEN 325 MG/1
650 TABLET ORAL EVERY 4 HOURS PRN
OUTPATIENT
Start: 2024-05-31

## 2024-05-31 RX ORDER — NITROGLYCERIN 0.4 MG/1
0.4 TABLET SUBLINGUAL
OUTPATIENT
Start: 2024-05-31

## 2024-05-31 RX ORDER — SODIUM CHLORIDE 0.9 % (FLUSH) 0.9 %
10 SYRINGE (ML) INJECTION AS NEEDED
OUTPATIENT
Start: 2024-05-31

## 2024-05-31 RX ORDER — ASPIRIN 81 MG/1
81 TABLET ORAL DAILY
OUTPATIENT
Start: 2024-06-01

## 2024-05-31 RX ORDER — SODIUM CHLORIDE 9 MG/ML
40 INJECTION, SOLUTION INTRAVENOUS AS NEEDED
OUTPATIENT
Start: 2024-05-31

## 2024-06-04 ENCOUNTER — HOSPITAL ENCOUNTER (OUTPATIENT)
Facility: HOSPITAL | Age: 74
Setting detail: HOSPITAL OUTPATIENT SURGERY
Discharge: HOME | End: 2024-06-04
Attending: INTERNAL MEDICINE | Admitting: INTERNAL MEDICINE
Payer: MEDICARE

## 2024-06-04 ENCOUNTER — APPOINTMENT (OUTPATIENT)
Dept: CARDIOLOGY | Facility: HOSPITAL | Age: 74
End: 2024-06-04
Payer: MEDICARE

## 2024-06-04 VITALS
RESPIRATION RATE: 15 BRPM | DIASTOLIC BLOOD PRESSURE: 64 MMHG | TEMPERATURE: 97.8 F | SYSTOLIC BLOOD PRESSURE: 116 MMHG | BODY MASS INDEX: 40.48 KG/M2 | HEIGHT: 67 IN | WEIGHT: 257.94 LBS | OXYGEN SATURATION: 95 % | HEART RATE: 68 BPM

## 2024-06-04 DIAGNOSIS — I25.10 CORONARY ARTERY DISEASE INVOLVING NATIVE CORONARY ARTERY OF NATIVE HEART WITHOUT ANGINA PECTORIS: ICD-10-CM

## 2024-06-04 DIAGNOSIS — I50.32 CHRONIC DIASTOLIC CONGESTIVE HEART FAILURE: ICD-10-CM

## 2024-06-04 LAB
ANION GAP SERPL CALCULATED.3IONS-SCNC: 9 MMOL/L (ref 5–15)
BH CV ECHO MEAS - AO MAX PG: 4.8 MMHG
BH CV ECHO MEAS - AO MEAN PG: 2 MMHG
BH CV ECHO MEAS - AO ROOT DIAM: 3.1 CM
BH CV ECHO MEAS - AO V2 MAX: 109 CM/SEC
BH CV ECHO MEAS - AO V2 VTI: 22.8 CM
BH CV ECHO MEAS - AVA(I,D): 1.82 CM2
BH CV ECHO MEAS - EDV(CUBED): 79.5 ML
BH CV ECHO MEAS - EDV(MOD-SP4): 111 ML
BH CV ECHO MEAS - EF(MOD-BP): 63.2 %
BH CV ECHO MEAS - EF(MOD-SP4): 63.2 %
BH CV ECHO MEAS - ESV(CUBED): 29.8 ML
BH CV ECHO MEAS - ESV(MOD-SP4): 40.8 ML
BH CV ECHO MEAS - FS: 27.9 %
BH CV ECHO MEAS - IVS/LVPW: 1 CM
BH CV ECHO MEAS - IVSD: 1 CM
BH CV ECHO MEAS - LA DIMENSION: 2.9 CM
BH CV ECHO MEAS - LAT PEAK E' VEL: 8.3 CM/SEC
BH CV ECHO MEAS - LV MASS(C)D: 142.5 GRAMS
BH CV ECHO MEAS - LV MAX PG: 1.79 MMHG
BH CV ECHO MEAS - LV MEAN PG: 1 MMHG
BH CV ECHO MEAS - LV V1 MAX: 66.9 CM/SEC
BH CV ECHO MEAS - LV V1 VTI: 12 CM
BH CV ECHO MEAS - LVIDD: 4.3 CM
BH CV ECHO MEAS - LVIDS: 3.1 CM
BH CV ECHO MEAS - LVOT AREA: 3.5 CM2
BH CV ECHO MEAS - LVOT DIAM: 2.1 CM
BH CV ECHO MEAS - LVPWD: 1 CM
BH CV ECHO MEAS - MED PEAK E' VEL: 3.5 CM/SEC
BH CV ECHO MEAS - MV A MAX VEL: 47.2 CM/SEC
BH CV ECHO MEAS - MV DEC SLOPE: 212 CM/SEC2
BH CV ECHO MEAS - MV DEC TIME: 0.28 SEC
BH CV ECHO MEAS - MV E MAX VEL: 39.4 CM/SEC
BH CV ECHO MEAS - MV E/A: 0.83
BH CV ECHO MEAS - MV MAX PG: 1.53 MMHG
BH CV ECHO MEAS - MV MEAN PG: 1 MMHG
BH CV ECHO MEAS - MV P1/2T: 68 MSEC
BH CV ECHO MEAS - MV V2 VTI: 19.1 CM
BH CV ECHO MEAS - MVA(P1/2T): 3.2 CM2
BH CV ECHO MEAS - MVA(VTI): 2.18 CM2
BH CV ECHO MEAS - PA ACC TIME: 0.15 SEC
BH CV ECHO MEAS - PA V2 MAX: 96.2 CM/SEC
BH CV ECHO MEAS - SV(LVOT): 41.6 ML
BH CV ECHO MEAS - SV(MOD-SP4): 70.2 ML
BH CV ECHO MEAS - TAPSE (>1.6): 1.53 CM
BH CV ECHO MEASUREMENTS AVERAGE E/E' RATIO: 6.68
BH CV XLRA - RV BASE: 3.5 CM
BH CV XLRA - RV LENGTH: 7.4 CM
BH CV XLRA - RV MID: 2.9 CM
BH CV XLRA - TDI S': 11 CM/SEC
BUN SERPL-MCNC: 17 MG/DL (ref 8–23)
BUN/CREAT SERPL: 11.9 (ref 7–25)
CALCIUM SPEC-SCNC: 9.4 MG/DL (ref 8.6–10.5)
CHLORIDE SERPL-SCNC: 107 MMOL/L (ref 98–107)
CO2 SERPL-SCNC: 27 MMOL/L (ref 22–29)
CREAT BLDA-MCNC: 1.6 MG/DL (ref 0.6–1.3)
CREAT SERPL-MCNC: 1.43 MG/DL (ref 0.57–1)
DEPRECATED RDW RBC AUTO: 44.4 FL (ref 37–54)
EGFRCR SERPLBLD CKD-EPI 2021: 39 ML/MIN/1.73
ERYTHROCYTE [DISTWIDTH] IN BLOOD BY AUTOMATED COUNT: 13.2 % (ref 12.3–15.4)
GLUCOSE SERPL-MCNC: 102 MG/DL (ref 65–99)
HCT VFR BLD AUTO: 46 % (ref 34–46.6)
HGB BLD-MCNC: 14.6 G/DL (ref 12–15.9)
MCH RBC QN AUTO: 29.6 PG (ref 26.6–33)
MCHC RBC AUTO-ENTMCNC: 31.7 G/DL (ref 31.5–35.7)
MCV RBC AUTO: 93.3 FL (ref 79–97)
PLATELET # BLD AUTO: 234 10*3/MM3 (ref 140–450)
PMV BLD AUTO: 9.6 FL (ref 6–12)
POTASSIUM SERPL-SCNC: 3.9 MMOL/L (ref 3.5–5.2)
RBC # BLD AUTO: 4.93 10*6/MM3 (ref 3.77–5.28)
SODIUM SERPL-SCNC: 143 MMOL/L (ref 136–145)
WBC NRBC COR # BLD AUTO: 10.87 10*3/MM3 (ref 3.4–10.8)

## 2024-06-04 PROCEDURE — 85027 COMPLETE CBC AUTOMATED: CPT | Performed by: PHYSICIAN ASSISTANT

## 2024-06-04 PROCEDURE — 25010000002 PHENYLEPHRINE 10 MG/ML SOLUTION: Performed by: INTERNAL MEDICINE

## 2024-06-04 PROCEDURE — 25010000002 SULFUR HEXAFLUORIDE MICROSPH 60.7-25 MG RECONSTITUTED SUSPENSION: Performed by: INTERNAL MEDICINE

## 2024-06-04 PROCEDURE — 25810000003 SODIUM CHLORIDE 0.9 % SOLUTION: Performed by: INTERNAL MEDICINE

## 2024-06-04 PROCEDURE — 25010000002 HEPARIN (PORCINE) PER 1000 UNITS: Performed by: INTERNAL MEDICINE

## 2024-06-04 PROCEDURE — 82565 ASSAY OF CREATININE: CPT

## 2024-06-04 PROCEDURE — 93306 TTE W/DOPPLER COMPLETE: CPT

## 2024-06-04 PROCEDURE — 25510000001 IOPAMIDOL PER 1 ML: Performed by: INTERNAL MEDICINE

## 2024-06-04 PROCEDURE — C1894 INTRO/SHEATH, NON-LASER: HCPCS | Performed by: INTERNAL MEDICINE

## 2024-06-04 PROCEDURE — 25810000003 SODIUM CHLORIDE 0.9 % SOLUTION: Performed by: PHYSICIAN ASSISTANT

## 2024-06-04 PROCEDURE — 25010000002 NICARDIPINE 2.5 MG/ML SOLUTION: Performed by: INTERNAL MEDICINE

## 2024-06-04 PROCEDURE — 25010000002 FENTANYL CITRATE (PF) 50 MCG/ML SOLUTION: Performed by: INTERNAL MEDICINE

## 2024-06-04 PROCEDURE — 25010000002 MIDAZOLAM PER 1 MG: Performed by: INTERNAL MEDICINE

## 2024-06-04 PROCEDURE — 93458 L HRT ARTERY/VENTRICLE ANGIO: CPT | Performed by: INTERNAL MEDICINE

## 2024-06-04 PROCEDURE — C1769 GUIDE WIRE: HCPCS | Performed by: INTERNAL MEDICINE

## 2024-06-04 PROCEDURE — 36415 COLL VENOUS BLD VENIPUNCTURE: CPT

## 2024-06-04 PROCEDURE — 93306 TTE W/DOPPLER COMPLETE: CPT | Performed by: INTERNAL MEDICINE

## 2024-06-04 PROCEDURE — 80048 BASIC METABOLIC PNL TOTAL CA: CPT | Performed by: PHYSICIAN ASSISTANT

## 2024-06-04 RX ORDER — FENTANYL CITRATE 50 UG/ML
INJECTION, SOLUTION INTRAMUSCULAR; INTRAVENOUS
Status: DISCONTINUED | OUTPATIENT
Start: 2024-06-04 | End: 2024-06-04 | Stop reason: HOSPADM

## 2024-06-04 RX ORDER — SODIUM CHLORIDE 0.9 % (FLUSH) 0.9 %
10 SYRINGE (ML) INJECTION EVERY 12 HOURS SCHEDULED
Status: DISCONTINUED | OUTPATIENT
Start: 2024-06-04 | End: 2024-06-04 | Stop reason: HOSPADM

## 2024-06-04 RX ORDER — MIDAZOLAM HYDROCHLORIDE 1 MG/ML
INJECTION INTRAMUSCULAR; INTRAVENOUS
Status: DISCONTINUED | OUTPATIENT
Start: 2024-06-04 | End: 2024-06-04 | Stop reason: HOSPADM

## 2024-06-04 RX ORDER — SODIUM CHLORIDE 9 MG/ML
40 INJECTION, SOLUTION INTRAVENOUS AS NEEDED
Status: DISCONTINUED | OUTPATIENT
Start: 2024-06-04 | End: 2024-06-04 | Stop reason: HOSPADM

## 2024-06-04 RX ORDER — SODIUM CHLORIDE 0.9 % (FLUSH) 0.9 %
10 SYRINGE (ML) INJECTION AS NEEDED
Status: DISCONTINUED | OUTPATIENT
Start: 2024-06-04 | End: 2024-06-04 | Stop reason: HOSPADM

## 2024-06-04 RX ORDER — NITROGLYCERIN 0.4 MG/1
0.4 TABLET SUBLINGUAL
Status: DISCONTINUED | OUTPATIENT
Start: 2024-06-04 | End: 2024-06-04 | Stop reason: HOSPADM

## 2024-06-04 RX ORDER — SODIUM CHLORIDE 9 MG/ML
150 INJECTION, SOLUTION INTRAVENOUS CONTINUOUS
Status: ACTIVE | OUTPATIENT
Start: 2024-06-04 | End: 2024-06-04

## 2024-06-04 RX ORDER — LIDOCAINE HYDROCHLORIDE 10 MG/ML
INJECTION, SOLUTION EPIDURAL; INFILTRATION; INTRACAUDAL; PERINEURAL
Status: DISCONTINUED | OUTPATIENT
Start: 2024-06-04 | End: 2024-06-04 | Stop reason: HOSPADM

## 2024-06-04 RX ORDER — HEPARIN SODIUM 1000 [USP'U]/ML
INJECTION, SOLUTION INTRAVENOUS; SUBCUTANEOUS
Status: DISCONTINUED | OUTPATIENT
Start: 2024-06-04 | End: 2024-06-04 | Stop reason: HOSPADM

## 2024-06-04 RX ORDER — ACETAMINOPHEN 325 MG/1
650 TABLET ORAL EVERY 4 HOURS PRN
Status: DISCONTINUED | OUTPATIENT
Start: 2024-06-04 | End: 2024-06-04 | Stop reason: HOSPADM

## 2024-06-04 RX ORDER — PHENYLEPHRINE HYDROCHLORIDE 10 MG/ML
INJECTION INTRAVENOUS
Status: DISCONTINUED | OUTPATIENT
Start: 2024-06-04 | End: 2024-06-04 | Stop reason: HOSPADM

## 2024-06-04 RX ORDER — OXYBUTYNIN CHLORIDE 10 MG/1
10 TABLET, EXTENDED RELEASE ORAL DAILY
COMMUNITY

## 2024-06-04 RX ORDER — ASPIRIN 81 MG/1
324 TABLET, CHEWABLE ORAL ONCE
Status: COMPLETED | OUTPATIENT
Start: 2024-06-04 | End: 2024-06-04

## 2024-06-04 RX ORDER — NICARDIPINE HCL-0.9% SOD CHLOR 1 MG/10 ML
SYRINGE (ML) INTRAVENOUS
Status: DISCONTINUED | OUTPATIENT
Start: 2024-06-04 | End: 2024-06-04 | Stop reason: HOSPADM

## 2024-06-04 RX ORDER — ASPIRIN 81 MG/1
81 TABLET ORAL DAILY
Status: DISCONTINUED | OUTPATIENT
Start: 2024-06-05 | End: 2024-06-04 | Stop reason: HOSPADM

## 2024-06-04 RX ADMIN — ASPIRIN 243 MG: 81 TABLET, CHEWABLE ORAL at 08:50

## 2024-06-04 RX ADMIN — SULFUR HEXAFLUORIDE 2 ML: KIT at 11:39

## 2024-06-04 RX ADMIN — SODIUM CHLORIDE 330 ML: 9 INJECTION, SOLUTION INTRAVENOUS at 08:49

## 2024-06-04 NOTE — Clinical Note
Hemostasis started on the right radial artery. R-Band was used in achieving hemostasis. Radial compression device applied to vessel. Hemostasis achieved successfully. Closure device additional comment: 9 cc air

## 2024-06-04 NOTE — INTERVAL H&P NOTE
"  H&P reviewed. The patient was examined and there are no changes to the H&P.      Vitals and Labs today:  Vitals:    06/04/24 0817 06/04/24 0819 06/04/24 0820   BP:  99/64 104/66   BP Location:  Right arm Left arm   Patient Position:  Lying Lying   Pulse:  75    Resp:  16    Temp:  97.8 °F (36.6 °C)    TempSrc:  Tympanic    SpO2:  96%    Weight: 117 kg (257 lb 11.5 oz)     Height: 170.2 cm (67\")         Lab Results   Component Value Date    WBC 10.87 (H) 06/04/2024    HGB 14.6 06/04/2024    HCT 46.0 06/04/2024    MCV 93.3 06/04/2024     06/04/2024     Lab Results   Component Value Date    GLUCOSE 99 05/16/2024    BUN 17 05/16/2024    CREATININE 1.20 (H) 05/16/2024    EGFRRESULT 48 (L) 05/16/2024    BCR 14 05/16/2024    K 4.5 05/16/2024    CO2 19 (L) 05/16/2024    CALCIUM 8.8 05/16/2024    PROTENTOTREF 6.0 05/16/2024    ALBUMIN 3.9 05/16/2024    BILITOT 1.1 05/16/2024    AST 18 05/16/2024    ALT 18 05/16/2024     Lab Results   Component Value Date    HGBA1C 5.6 12/29/2023     Lab Results   Component Value Date    CHLPL 136 05/16/2024    TRIG 66 05/16/2024    HDL 60 05/16/2024    LDL 62 05/16/2024     The risks, benefits, and alternative options of cardiac catheterization were discussed with the patient.  The risks include death, MI, stroke, infection, vascular injury requiring surgical repair and/or blood transfusion, coronary dissection, renal dysfunction/failure, allergic reaction, emergent CABG.  If PCI is needed there is a 1-2% risk of emergent CABG.  The patient is agreeable for cardiac catheterization, possible PCI or CABG. Plan is to proceed with cardiac catheterization and possible PCI.     Joe Wagner MD, FACC, [facility]  Interventional Cardiology  06/04/24  09:17 EDT        "

## 2024-06-24 ENCOUNTER — OFFICE VISIT (OUTPATIENT)
Dept: CARDIOLOGY | Facility: CLINIC | Age: 74
End: 2024-06-24
Payer: MEDICARE

## 2024-06-24 VITALS
SYSTOLIC BLOOD PRESSURE: 132 MMHG | RESPIRATION RATE: 18 BRPM | OXYGEN SATURATION: 98 % | DIASTOLIC BLOOD PRESSURE: 86 MMHG | HEART RATE: 74 BPM | HEIGHT: 67 IN | BODY MASS INDEX: 42.06 KG/M2 | WEIGHT: 268 LBS

## 2024-06-24 DIAGNOSIS — I25.10 CORONARY ARTERY DISEASE INVOLVING NATIVE CORONARY ARTERY OF NATIVE HEART WITHOUT ANGINA PECTORIS: Chronic | ICD-10-CM

## 2024-06-24 DIAGNOSIS — E78.2 HYPERLIPIDEMIA, MIXED: ICD-10-CM

## 2024-06-24 DIAGNOSIS — G47.33 OSA (OBSTRUCTIVE SLEEP APNEA): Chronic | ICD-10-CM

## 2024-06-24 DIAGNOSIS — I10 ESSENTIAL HYPERTENSION: Chronic | ICD-10-CM

## 2024-06-24 DIAGNOSIS — I50.32 CHRONIC DIASTOLIC CONGESTIVE HEART FAILURE: Primary | Chronic | ICD-10-CM

## 2024-06-24 PROCEDURE — 99214 OFFICE O/P EST MOD 30 MIN: CPT | Performed by: INTERNAL MEDICINE

## 2024-06-24 PROCEDURE — 1159F MED LIST DOCD IN RCRD: CPT | Performed by: INTERNAL MEDICINE

## 2024-06-24 PROCEDURE — 1160F RVW MEDS BY RX/DR IN RCRD: CPT | Performed by: INTERNAL MEDICINE

## 2024-06-24 PROCEDURE — 3079F DIAST BP 80-89 MM HG: CPT | Performed by: INTERNAL MEDICINE

## 2024-06-24 PROCEDURE — 3075F SYST BP GE 130 - 139MM HG: CPT | Performed by: INTERNAL MEDICINE

## 2024-06-24 PROCEDURE — 93000 ELECTROCARDIOGRAM COMPLETE: CPT | Performed by: INTERNAL MEDICINE

## 2024-06-24 RX ORDER — NITROGLYCERIN 0.4 MG/1
0.4 TABLET SUBLINGUAL
Qty: 30 TABLET | Refills: 3 | Status: SHIPPED | OUTPATIENT
Start: 2024-06-24

## 2024-06-24 RX ORDER — SEMAGLUTIDE 0.5 MG/.5ML
0.5 INJECTION, SOLUTION SUBCUTANEOUS WEEKLY
Qty: 2 ML | Refills: 0 | Status: SHIPPED | OUTPATIENT
Start: 2024-06-24 | End: 2024-06-28

## 2024-06-24 RX ORDER — SOTAGLIFLOZIN 200 MG/1
200 TABLET ORAL DAILY
Qty: 90 TABLET | Refills: 3 | Status: SHIPPED | OUTPATIENT
Start: 2024-06-24

## 2024-06-24 NOTE — PROGRESS NOTES
[facility] [place]  [facility] CARDIOLOGY  [address] [place] [address]  Dept: [phone]  Dept Fax: [phone]    Sabina Harkins  1950    Follow Up Office Visit Note    History of Present Illness:  Sabina Harkins is a 74 y.o. female who presents to the clinic for  CHF- diastolic dysfunction- She underwent cardiac cath with mild disease, prior stents patent, on ASA, Torsemide 20 mg, has not been able to take Farxiga, will use Inpega, 200 mg, , she does have some swelling and gained weight again 9 pounds, likely related to Farxiga being off, will use Aldactone 20 mg in addition Inpega, patient was advised she needs to lose weight, will try Wegoby,    The following portions of the patient's history were reviewed and updated as appropriate: allergies, current medications, past family history, past medical history, past social history, past surgical history, and problem list.    Medications:  allopurinol  aspirin  atorvastatin  buPROPion XL  carvedilol  clopidogrel  ezetimibe  Inpefa tablet  losartan  nitroglycerin  oxybutynin XL  pantoprazole  polyethylene glycol  potassium chloride ER tablet controlled-release  promethazine  sertraline  torsemide  Vitamin D capsule  Wegovy solution auto-injector    Subjective  Allergies   Allergen Reactions   • Codeine Unknown - High Severity     Pt had surgery and doctor believes she might be allergic to it. Patient is not sure what it does.   • Nabumetone Unknown - High Severity   • Gabapentin Hallucinations        Past Medical History:   Diagnosis Date   • Allergic rhinitis    • Bladder disorder    • Body mass index (BMI) of 40.0 to 44.9 in adult    • CAD (coronary artery disease)    • Chest pain    • Chronic bilateral low back pain with sciatica    • Chronic diastolic heart failure    • Chronic systolic (congestive) heart failure    • Complex dyslipidemia    • Coronary artery disease involving native coronary artery of native heart without angina  pectoris    • Depressive disorder    • Diastolic heart failure, stage B    • Dyspnea    • Essential hypertension    • Gait abnormality    • GERD without esophagitis    • Gout    • Heart disease    • History of colonic polyps    • Hyperglycemia    • Hypokalemia    • Idiopathic 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 tophus    • Increased frequency of urination    • Knee pain    • LAD (linear IgA dermatosis)    • Left ventricular diastolic dysfunction    • Long term (current) use of opiate analgesic    • Low back pain at multiple sites    • Lumbar radiculitis    • Mixed hyperlipidemia    • Morbid obesity    • Multiple vessel coronary artery disease    • Myocardial infarction    • Osteoarthritis of knee    • Other chronic pain    • Overactive bladder    • Pure hypercholesterolemia    • Seasonal allergies    • SOB (shortness of breath)    • Vitamin B12 deficiency    • Vitamin D deficiency        Past Surgical History:   Procedure Laterality Date   • CARDIAC CATHETERIZATION     • CARDIAC CATHETERIZATION N/A 6/4/2024    Procedure: Left Heart Cath;  Surgeon: Joe Wagner MD;  Location: [facility] INVASIVE LOCATION;  Service: Cardiovascular;  Laterality: N/A;   • COLONOSCOPY     • CORONARY STENT PLACEMENT     • KNEE SURGERY      ARTHROSCOPIC SURGERY RIGHT KNEE   • LUMBAR FUSION     • OTHER SURGICAL HISTORY      RESECTION OF SWEAT GLAND AND ACELOUS SKIN   • TUBAL ABDOMINAL LIGATION Bilateral        Family History   Problem Relation Age of Onset   • Colon cancer Mother    • Stroke Father    • Hypertension Sister    • Cardiomyopathy Brother    • Stroke Other    • Diabetes type II Daughter    • Diabetes type II Son         Social History     Socioeconomic History   • Marital status:    Tobacco Use   • Smoking status: Never   • Smokeless tobacco: Never   Vaping Use   • Vaping status: Never Used   Substance and Sexual Activity   • Alcohol use: Yes     Comment: SOCIALLY   • Drug use: Never   • Sexual activity: Defer  "      Review of Systems   Constitutional: Negative.    HENT: Negative.     Respiratory:  Positive for shortness of breath.    Cardiovascular:  Positive for leg swelling.   Endocrine: Negative.    Genitourinary: Negative.    Musculoskeletal: Negative.    Skin: Negative.    Allergic/Immunologic: Negative.    Neurological: Negative.    Hematological: Negative.    Psychiatric/Behavioral: Negative.         Cardiovascular Procedures    ECHO/MUGA:  STRESS TESTS:   CARDIAC CATH:   DEVICES:   HOLTER:   CT/MRI:   VASCULAR:   CARDIOTHORACIC:     Objective  Vitals:    06/24/24 1110   BP: 132/86   Pulse: 74   Resp: 18   SpO2: 98%   Weight: 122 kg (268 lb)   Height: 170.2 cm (67.01\")   PainSc: 0-No pain     Body mass index is 41.96 kg/m².     Physical Exam  Vitals reviewed.   Constitutional:       Appearance: Healthy appearance. Not in distress.   Neck:      Vascular: No JVR. JVD normal.   Pulmonary:      Effort: Pulmonary effort is normal.      Breath sounds: Normal breath sounds. No wheezing. No rhonchi. No rales.   Chest:      Chest wall: Not tender to palpatation.   Cardiovascular:      PMI at left midclavicular line. Normal rate. Regular rhythm. Normal S1. Normal S2.       Murmurs: There is no murmur.      No gallop.  No click. No rub.   Pulses:     Intact distal pulses.   Edema:     Thigh: bilateral 2+ edema of the thigh.     Pretibial: bilateral 2+ edema of the pretibial area.     Ankle: bilateral 2+ edema of the ankle.     Feet: bilateral 2+ edema of the feet.  Abdominal:      General: Bowel sounds are normal.      Palpations: Abdomen is soft.      Tenderness: There is no abdominal tenderness.   Musculoskeletal: Normal range of motion.         General: No tenderness. Skin:     General: Skin is warm and dry.   Neurological:      General: No focal deficit present.      Mental Status: Alert and oriented to person, place and time.        Diagnostic Data    ECG 12 Lead    Date/Time: 6/24/2024 1:10 PM  Performed by: Kat" Mehul Guajardo MD    Authorized by: Mehul Stephens MD  Comparison: compared with previous ECG from 5/14/2024  Similar to previous ECG  Rhythm: sinus rhythm  Rate: normal  BPM: 76  QRS axis: normal  Other findings: non-specific ST-T wave changes    Clinical impression: normal ECG        Assessment and Plan  Diagnoses and all orders for this visit:    Chronic diastolic congestive heart failure- No taking Farxiga, gaining more weight just on Torsemide 20 mg, will add Aldactone 25 mg, will add Inpega. Plus Wegoby, needs to lose weight    Coronary artery disease involving native coronary artery of native heart without angina pectoris- No chest pain, has cardiac cath with mild disease on ASA    Essential hypertension- BP is fine 130.80, on Losartan 50 mg, Torsemide 20 mg, will add Aldactone 25mg    Hyperlipidemia, mixed-  On Lipitor 80 mg, and Zetia, unfortunately price is so high    ERICA (obstructive sleep apnea)    Other orders  -     Semaglutide-Weight Management (Wegovy) 0.5 MG/0.5ML solution auto-injector; Inject 0.5 mL under the skin into the appropriate area as directed 1 (One) Time Per Week.  -     nitroglycerin (NITROSTAT) 0.4 MG SL tablet; Place 1 tablet under the tongue Every 5 (Five) Minutes As Needed for Chest Pain. Take no more than 3 doses in 15 minutes.  -     Sotagliflozin (Inpefa) 200 MG tablet; Take 1 tablet by mouth Daily.         Return in about 3 months (around 9/24/2024) for Recheck with Dr. Stephens.    Mehul Stephens MD  06/24/2024

## 2024-06-28 ENCOUNTER — OFFICE VISIT (OUTPATIENT)
Dept: FAMILY MEDICINE CLINIC | Facility: CLINIC | Age: 74
End: 2024-06-28
Payer: MEDICARE

## 2024-06-28 VITALS
BODY MASS INDEX: 40.34 KG/M2 | HEART RATE: 89 BPM | HEIGHT: 67 IN | SYSTOLIC BLOOD PRESSURE: 118 MMHG | WEIGHT: 257 LBS | DIASTOLIC BLOOD PRESSURE: 82 MMHG | OXYGEN SATURATION: 93 %

## 2024-06-28 DIAGNOSIS — G89.29 BILATERAL CHRONIC KNEE PAIN: ICD-10-CM

## 2024-06-28 DIAGNOSIS — I10 ESSENTIAL HYPERTENSION: ICD-10-CM

## 2024-06-28 DIAGNOSIS — M67.441 GANGLION CYST OF JOINT OF FINGER OF RIGHT HAND: ICD-10-CM

## 2024-06-28 DIAGNOSIS — M25.561 BILATERAL CHRONIC KNEE PAIN: ICD-10-CM

## 2024-06-28 DIAGNOSIS — G89.29 CHRONIC BILATERAL LOW BACK PAIN WITHOUT SCIATICA: ICD-10-CM

## 2024-06-28 DIAGNOSIS — R26.81 GAIT INSTABILITY: Chronic | ICD-10-CM

## 2024-06-28 DIAGNOSIS — I25.10 CORONARY ARTERY DISEASE INVOLVING NATIVE CORONARY ARTERY OF NATIVE HEART WITHOUT ANGINA PECTORIS: ICD-10-CM

## 2024-06-28 DIAGNOSIS — M25.562 BILATERAL CHRONIC KNEE PAIN: ICD-10-CM

## 2024-06-28 DIAGNOSIS — K21.9 GASTROESOPHAGEAL REFLUX DISEASE WITHOUT ESOPHAGITIS: ICD-10-CM

## 2024-06-28 DIAGNOSIS — M15.9 GENERALIZED OSTEOARTHRITIS OF MULTIPLE SITES: Chronic | ICD-10-CM

## 2024-06-28 DIAGNOSIS — M54.50 CHRONIC BILATERAL LOW BACK PAIN WITHOUT SCIATICA: ICD-10-CM

## 2024-06-28 DIAGNOSIS — M1A.0790 IDIOPATHIC CHRONIC GOUT OF ANKLE WITHOUT TOPHUS, UNSPECIFIED LATERALITY: ICD-10-CM

## 2024-06-28 DIAGNOSIS — I50.32 CHRONIC DIASTOLIC CONGESTIVE HEART FAILURE: ICD-10-CM

## 2024-06-28 DIAGNOSIS — F32.A DEPRESSIVE DISORDER: ICD-10-CM

## 2024-06-28 DIAGNOSIS — R73.9 HYPERGLYCEMIA: ICD-10-CM

## 2024-06-28 DIAGNOSIS — E55.9 VITAMIN D DEFICIENCY: ICD-10-CM

## 2024-06-28 DIAGNOSIS — E66.01 CLASS 3 SEVERE OBESITY DUE TO EXCESS CALORIES WITH SERIOUS COMORBIDITY AND BODY MASS INDEX (BMI) OF 40.0 TO 44.9 IN ADULT: ICD-10-CM

## 2024-06-28 DIAGNOSIS — G47.33 OSA (OBSTRUCTIVE SLEEP APNEA): Chronic | ICD-10-CM

## 2024-06-28 DIAGNOSIS — N32.81 OVERACTIVE BLADDER: Chronic | ICD-10-CM

## 2024-06-28 DIAGNOSIS — Z00.00 GENERAL MEDICAL EXAM: Primary | ICD-10-CM

## 2024-06-28 DIAGNOSIS — F41.1 ANXIETY STATE: Chronic | ICD-10-CM

## 2024-06-28 DIAGNOSIS — E87.6 HYPOKALEMIA: ICD-10-CM

## 2024-06-28 RX ORDER — ATORVASTATIN CALCIUM 80 MG/1
80 TABLET, FILM COATED ORAL DAILY
Qty: 90 TABLET | Refills: 1 | Status: SHIPPED | OUTPATIENT
Start: 2024-06-28

## 2024-06-28 RX ORDER — FESOTERODINE FUMARATE 8 MG/1
8 TABLET, FILM COATED, EXTENDED RELEASE ORAL
Qty: 90 TABLET | Refills: 1 | Status: SHIPPED | OUTPATIENT
Start: 2024-06-28

## 2024-06-28 RX ORDER — PANTOPRAZOLE SODIUM 40 MG/1
40 TABLET, DELAYED RELEASE ORAL 2 TIMES DAILY
Qty: 180 TABLET | Refills: 1 | Status: SHIPPED | OUTPATIENT
Start: 2024-06-28

## 2024-06-28 RX ORDER — TORSEMIDE 20 MG/1
20 TABLET ORAL DAILY
Qty: 90 TABLET | Refills: 1 | Status: SHIPPED | OUTPATIENT
Start: 2024-06-28

## 2024-06-28 RX ORDER — ASPIRIN 81 MG/1
81 TABLET ORAL DAILY
Qty: 90 TABLET | Refills: 1 | Status: SHIPPED | OUTPATIENT
Start: 2024-06-28

## 2024-06-28 RX ORDER — ALLOPURINOL 300 MG/1
300 TABLET ORAL DAILY
Qty: 90 TABLET | Refills: 1 | Status: SHIPPED | OUTPATIENT
Start: 2024-06-28

## 2024-06-28 RX ORDER — EZETIMIBE 10 MG/1
10 TABLET ORAL DAILY
Qty: 90 TABLET | Refills: 1 | Status: SHIPPED | OUTPATIENT
Start: 2024-06-28

## 2024-06-28 RX ORDER — MULTIVIT-MIN/IRON/FOLIC ACID/K 18-600-40
CAPSULE ORAL
Qty: 180 CAPSULE | Refills: 1 | Status: SHIPPED | OUTPATIENT
Start: 2024-06-28

## 2024-06-28 RX ORDER — BUPROPION HYDROCHLORIDE 150 MG/1
150 TABLET ORAL DAILY
Qty: 90 TABLET | Refills: 1 | Status: SHIPPED | OUTPATIENT
Start: 2024-06-28

## 2024-06-28 NOTE — ASSESSMENT & PLAN NOTE
[facility]    DOES NOT WANT SGY AT THIS TIME BUT WILL LET US KNOW IF/WHEN READY FOR HAND ORTHO REFERRAL.

## 2024-06-28 NOTE — PROGRESS NOTES
The ABCs of the Annual Wellness Visit  Subsequent Medicare Wellness Visit    Subjective    Sabina Harkins is a 74 y.o. female who presents for a Subsequent Medicare Wellness Visit.    The following portions of the patient's history were reviewed and   updated as appropriate: allergies, current medications, past family history, past medical history, past social history, past surgical history, and problem list.    Compared to one year ago, the patient feels her physical   health is worse - given osteoarthritis back, hip, and joint pains    Compared to one year ago, the patient feels her mental   health is the same.    Recent Hospitalizations:  She was not admitted to the hospital during the last year.       Current Medical Providers:  Patient Care Team:  Juan Ramon Valenzuela MD as PCP - General (Family Medicine)  Juan Ramon Valenzuela MD as Consulting Physician (Family Medicine)  Carlos Chou MD (Urology)  Joe Wagner MD as Consulting Physician (Cardiology)  Mehul Stephens MD as Consulting Physician (Cardiology)    Outpatient Medications Prior to Visit   Medication Sig Dispense Refill    carvedilol (COREG) 25 MG tablet Take 2 tablets by mouth 2 (Two) Times a Day With Meals. Take 50 mg bid 360 tablet 1    clopidogrel (PLAVIX) 75 MG tablet Take 1 tablet by mouth Daily. 90 tablet 1    losartan (COZAAR) 50 MG tablet Take 1 tablet by mouth Daily. for blood pressure 90 tablet 3    nitroglycerin (NITROSTAT) 0.4 MG SL tablet Place 1 tablet under the tongue Every 5 (Five) Minutes As Needed for Chest Pain. Take no more than 3 doses in 15 minutes. 30 tablet 3    polyethylene glycol (MIRALAX) 17 GM/SCOOP powder Take 17 g by mouth Daily.      promethazine (PHENERGAN) 25 MG tablet TAKE 1/2 TO 1 (ONE-HALF TO ONE) TABLET BY MOUTH EVERY 4 TO 6 HOURS AS NEEDED FOR NAUSEA CAUTION SEDATION      Sotagliflozin (Inpefa) 200 MG tablet Take 1 tablet by mouth Daily. 90 tablet 3    allopurinol (ZYLOPRIM) 300 MG tablet  Take 1 tablet by mouth Daily. For gout prevention 90 tablet 1    aspirin 81 MG EC tablet Take 1 tablet by mouth Daily. 90 tablet 1    atorvastatin (LIPITOR) 80 MG tablet Take 1 tablet by mouth Daily. 90 tablet 1    buPROPion XL (Wellbutrin XL) 150 MG 24 hr tablet Take 1 tablet by mouth Daily. For appetite and mood 90 tablet 1    Cholecalciferol (Vitamin D) 50 MCG (2000 UT) capsule Take 2 caps po daily for vit D 180 capsule 1    ezetimibe (ZETIA) 10 MG tablet Take 1 tablet by mouth Daily. 90 tablet 1    oxybutynin XL (DITROPAN-XL) 10 MG 24 hr tablet Take 1 tablet by mouth Daily.      pantoprazole (PROTONIX) 40 MG EC tablet Take 1 tablet by mouth 2 (Two) Times a Day. For acid reflux 180 tablet 1    Semaglutide-Weight Management (Wegovy) 0.5 MG/0.5ML solution auto-injector Inject 0.5 mL under the skin into the appropriate area as directed 1 (One) Time Per Week. 2 mL 0    sertraline (ZOLOFT) 50 MG tablet Take 1 tablet by mouth Daily. For mood 90 tablet 1    torsemide (DEMADEX) 20 MG tablet Take 1 tablet by mouth Daily. 90 tablet 1     No facility-administered medications prior to visit.       No opioid medication identified on active medication list. I have reviewed chart for other potential  high risk medication/s and harmful drug interactions in the elderly.        Aspirin is on active medication list. Aspirin use is indicated based on review of current medical condition/s. Pros and cons of this therapy have been discussed today. Benefits of this medication outweigh potential harm.  Patient has been encouraged to continue taking this medication.  .      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  ACP discussion was held with the patient during this visit. Patient does not have an advance directive, information provided.     Objective    Vitals:    24 0956   BP: 118/82   BP Location: Left arm   Patient Position: Sitting   Cuff Size: Adult   Pulse: 89   SpO2: 93%   Weight: 117 kg (257 lb)   Height: 170.2 cm (67.01\")     Estimated body mass index is 40.24 kg/m² as calculated from the following:    Height as of this encounter: 170.2 cm (67.01\").    Weight as of this encounter: 117 kg (257 lb).           Does the patient have evidence of cognitive impairment? No    Lab Results   Component Value Date    CHLPL 136 2024    TRIG 66 2024    HDL 60 2024    LDL 62 2024    VLDL 14 2024        HEALTH RISK ASSESSMENT    Smoking Status:  Social History     Tobacco Use   Smoking Status Never   Smokeless Tobacco Never     Alcohol Consumption:  Social History     Substance and Sexual Activity   Alcohol Use Yes    Comment: SOCIALLY     Fall Risk Screen:    STEADI Fall Risk Assessment was completed, and patient is at LOW risk for falls.Assessment completed on:2024    Depression Screenin/28/2024     9:54 AM   PHQ-2/PHQ-9 Depression Screening   Little Interest or Pleasure in Doing Things 0-->not at all   Feeling Down, Depressed or Hopeless 0-->not at all   PHQ-9: Brief Depression Severity Measure Score 0       Health Habits " and Functional and Cognitive Screenin/28/2024     9:53 AM   Functional & Cognitive Status   Do you have difficulty preparing food and eating? No   Do you have difficulty bathing yourself, getting dressed or grooming yourself? No   Do you have difficulty using the toilet? No   Do you have difficulty moving around from place to place? Yes   Do you have trouble with steps or getting out of a bed or a chair? Yes   Current Diet Unhealthy Diet   Dental Exam Not up to date   Eye Exam Not up to date   Exercise (times per week) 1 times per week   Current Exercises Include Other   Do you need help using the phone?  No   Are you deaf or do you have serious difficulty hearing?  No   Do you need help to go to places out of walking distance? Yes   Do you need help shopping? No   Do you need help preparing meals?  No   Do you need help with housework?  Yes   Do you need help with laundry? No   Do you need help taking your medications? No   Do you need help managing money? No   Do you ever drive or ride in a car without wearing a seat belt? No   Have you felt unusual stress, anger or loneliness in the last month? Yes   Who do you live with? Child   If you need help, do you have trouble finding someone available to you? No   Have you been bothered in the last four weeks by sexual problems? No   Do you have difficulty concentrating, remembering or making decisions? Yes       Age-appropriate Screening Schedule:  Refer to the list below for future screening recommendations based on patient's age, sex and/or medical conditions. Orders for these recommended tests are listed in the plan section. The patient has been provided with a written plan.    Health Maintenance   Topic Date Due    HEMOGLOBIN A1C  2024    INFLUENZA VACCINE  2024    MAMMOGRAM  2025    DXA SCAN  2025    LIPID PANEL  2025    ANNUAL WELLNESS VISIT  2025    BMI FOLLOWUP  2025    COLORECTAL CANCER SCREENING  2029     HEPATITIS C SCREENING  Completed    RSV Vaccine - Adults  Completed    Pneumococcal Vaccine 65+  Completed    COVID-19 Vaccine  Discontinued    DIABETIC FOOT EXAM  Discontinued    DIABETIC EYE EXAM  Discontinued    URINE MICROALBUMIN  Discontinued    TDAP/TD VACCINES  Discontinued    ZOSTER VACCINE  Discontinued                  CMS Preventative Services Quick Reference  Risk Factors Identified During Encounter  Fall Risk-High or Moderate: Discussed Fall Prevention in the home  The above risks/problems have been discussed with the patient.  Pertinent information has been shared with the patient in the After Visit Summary.  An After Visit Summary and PPPS were made available to the patient.    Follow Up:   Next Medicare Wellness visit to be scheduled in 1 year.       Additional E&M Note during same encounter follows:  Patient has multiple medical problems which are significant and separately identifiable that require additional work above and beyond the Medicare Wellness Visit.      Chief Complaint  Chronic back pain, gout, osteoarthritis, mild depression, hypertension, hyperlipidemia, hyperglycemia, coronary artery disease, overactive bladder, GERD    Subjective        HPI  Sabina Harkins is also being seen today for follow-up visit and medication refills.    Doing well overall since our last visit together in December but does continue to flareup with knee and hip pains and would like to see orthopedics    Interested in referral to weight management given ongoing problems with obesity and obesity related health conditions and inability of cardiology to help get Wegovy approved.    Symptomatic right thumb ganglion cyst but does not want excision at this time.  Will consider hand orthopedic referral in the future.    She did the best with Toviaz for overactive bladder symptoms but changed back to oxybutynin given cost concerns.  She did fail Botox bladder injections, Ditropan, Ditropan XL, Detrol, and Detrol LA.  Would  like to restart Toviaz.    She does continue to have ongoing problems with chronic back and osteoarthritic pain.  She is off NSAIDs given concerns with chronic kidney disease and also given her current medications from cardiology.  We did try tramadol in the past but this was ineffective.  Gabapentin caused problems with hallucinations.  She was on Lyrica in the past from neurosurgery and she feels this may have helped but she wants to wait on restarting anything today and understands this is a controlled substance if she decides she wants to try Lyrica again she would need an office visit, UDS, and controlled substance agreement completed with me.    No gout flareups on allopurinol.    Mild depression doing well with combination of Zoloft and Wellbutrin.     GERD controlled with twice daily Protonix after she failed once daily dosing and on PPI treatment.     Ongoing cardiology follow-up for management of her coronary artery disease and hyperlipidemia.  She was unable to afford Farxiga and Repatha injections but does continue on her current regimen for chronic congestive heart failure and hyperlipidemia with hypertension.     Arthritic workup with her labs in June 2023 did return negative for autoimmune and rheumatoid arthritis.               Review of Systems   Constitutional:  Negative for activity change, appetite change, chills, fatigue and fever.   HENT:  Negative for ear pain, hearing loss and trouble swallowing.    Eyes:  Negative for pain and visual disturbance.   Respiratory:  Negative for cough, chest tightness, shortness of breath and wheezing.    Cardiovascular:  Negative for chest pain, palpitations and leg swelling.   Gastrointestinal:  Negative for abdominal pain and blood in stool.   Genitourinary:  Positive for frequency and urgency. Negative for difficulty urinating.   Musculoskeletal:  Positive for arthralgias, back pain and gait problem. Negative for joint swelling.   Skin:  Negative for rash.  "  Neurological:  Negative for dizziness, weakness and light-headedness.   Psychiatric/Behavioral:  Negative for agitation, behavioral problems, dysphoric mood and sleep disturbance.        Objective   Vital Signs:  /82 (BP Location: Left arm, Patient Position: Sitting, Cuff Size: Adult)   Pulse 89   Ht 170.2 cm (67.01\")   Wt 117 kg (257 lb)   SpO2 93%   BMI 40.24 kg/m²     Physical Exam  Constitutional:       Appearance: She is obese.   HENT:      Head: Normocephalic.      Right Ear: External ear normal.      Left Ear: External ear normal.      Nose: Nose normal.   Eyes:      Pupils: Pupils are equal, round, and reactive to light.   Neck:      Vascular: No carotid bruit.   Cardiovascular:      Rate and Rhythm: Normal rate and regular rhythm.      Heart sounds: Normal heart sounds.   Pulmonary:      Effort: Pulmonary effort is normal.      Breath sounds: Normal breath sounds.   Musculoskeletal:      Cervical back: Normal range of motion. No tenderness.      Right lower leg: Edema present.      Left lower leg: Edema present.      Comments: Generalized osteoarthritic changes.  No tophi or inflammatory joint changes.    Stable trace lower extremity edema   Skin:     General: Skin is warm and dry.   Neurological:      General: No focal deficit present.      Mental Status: She is alert.   Psychiatric:         Mood and Affect: Mood normal.         Behavior: Behavior normal.         Thought Content: Thought content normal.                         Assessment and Plan   Diagnoses and all orders for this visit:    1. General medical exam (Primary)  Assessment & Plan:  Discussed together health maintenance and screening along with vaccination options and healthy diet and exercise habits as part of the preventative counseling at their physical exam today.       2. Coronary artery disease involving native coronary artery of native heart without angina pectoris  Assessment & Plan:  Coronary artery disease is improving " with treatment.  Continue current treatment regimen.  Cardiac status will be reassessed in 6 months.    Orders:  -     Comprehensive Metabolic Panel; Future  -     Lipid Panel; Future  -     TSH; Future  -     T4, Free; Future  -     aspirin 81 MG EC tablet; Take 1 tablet by mouth Daily.  Dispense: 90 tablet; Refill: 1  -     atorvastatin (LIPITOR) 80 MG tablet; Take 1 tablet by mouth Daily.  Dispense: 90 tablet; Refill: 1  -     ezetimibe (ZETIA) 10 MG tablet; Take 1 tablet by mouth Daily.  Dispense: 90 tablet; Refill: 1  -     Comprehensive Metabolic Panel  -     Lipid Panel  -     TSH  -     T4, Free    3. Chronic diastolic congestive heart failure  Assessment & Plan:  Doing well with current regimen.  Medications refilled.  Awaiting recheck on renal function and electrolytes with lab work today    Ongoing cardiology follow-up.  Unable to afford co-pay with Farxiga and Jardiance    Orders:  -     Comprehensive Metabolic Panel; Future  -     Lipid Panel; Future  -     TSH; Future  -     T4, Free; Future  -     atorvastatin (LIPITOR) 80 MG tablet; Take 1 tablet by mouth Daily.  Dispense: 90 tablet; Refill: 1  -     ezetimibe (ZETIA) 10 MG tablet; Take 1 tablet by mouth Daily.  Dispense: 90 tablet; Refill: 1  -     torsemide (DEMADEX) 20 MG tablet; Take 1 tablet by mouth Daily.  Dispense: 90 tablet; Refill: 1  -     Comprehensive Metabolic Panel  -     Lipid Panel  -     TSH  -     T4, Free    4. Essential hypertension  Assessment & Plan:  Hypertension is improving with treatment.  Continue current treatment regimen.  Blood pressure will be reassessed at the next regular appointment.    Orders:  -     Comprehensive Metabolic Panel; Future  -     Lipid Panel; Future  -     TSH; Future  -     T4, Free; Future  -     Comprehensive Metabolic Panel  -     Lipid Panel  -     TSH  -     T4, Free    5. Hypokalemia  Assessment & Plan:  Continue potassium supplementation.        6. Chronic bilateral low back pain without  sciatica  Assessment & Plan:  Discussed chronic back and osteoarthritic pain.  She is using Tylenol and topical Voltaren gel as needed.      We did review her past medications and discussed ability to restart Lyrica but would need UDS, Brown reviewed, and controlled substance agreement.  She would like to hold off on restarting Lyrica at this time.    Continue to use cane for ambulatory support      7. Hyperglycemia  Assessment & Plan:  Awaiting recheck on A1c with labs    Orders:  -     Hemoglobin A1c; Future  -     Hemoglobin A1c    8. Idiopathic chronic gout of ankle without tophus, unspecified laterality  Assessment & Plan:  Continue losartan and allopurinol.  We will check uric acid with labs    Orders:  -     Uric acid; Future  -     allopurinol (ZYLOPRIM) 300 MG tablet; Take 1 tablet by mouth Daily. For gout prevention  Dispense: 90 tablet; Refill: 1  -     Uric acid    9. Depressive disorder  Assessment & Plan:  Patient's depression is recurrent and is mild without psychosis. Their depression is currently in partial remission and the condition is improving with treatment. This will be reassessed at the next regular appointment. F/U as described:patient will continue current medication therapy.    Orders:  -     buPROPion XL (Wellbutrin XL) 150 MG 24 hr tablet; Take 1 tablet by mouth Daily. For appetite and mood  Dispense: 90 tablet; Refill: 1  -     sertraline (ZOLOFT) 50 MG tablet; Take 1 tablet by mouth Daily. For mood  Dispense: 90 tablet; Refill: 1    10. Vitamin D deficiency  Assessment & Plan:  Awaiting recheck on vitamin D    Orders:  -     Vitamin D,25-Hydroxy; Future  -     Cholecalciferol (Vitamin D) 50 MCG (2000 UT) capsule; Take 2 caps po daily for vit D  Dispense: 180 capsule; Refill: 1  -     Vitamin D,25-Hydroxy    11. Gastroesophageal reflux disease without esophagitis  Assessment & Plan:  Controlled with Protonix    Orders:  -     pantoprazole (PROTONIX) 40 MG EC tablet; Take 1 tablet by  mouth 2 (Two) Times a Day. For acid reflux  Dispense: 180 tablet; Refill: 1    12. Overactive bladder  Assessment & Plan:  Restarting treatment with Toviaz    She does continue to follow-up with Dr. Chou as well    Orders:  -     fesoterodine fumarate (TOVIAZ ER) 8 MG tablet sustained-release 24 hour tablet; Take 1 tablet by mouth Daily.  Dispense: 90 tablet; Refill: 1    13. ERICA (obstructive sleep apnea)  Assessment & Plan:  Encouraged CPAP use      14. Generalized osteoarthritis of multiple sites  Assessment & Plan:  Negative workup for rheumatoid arthritis and inflammatory arthritis last year    Continue with Tylenol and use of cane for ambulatory support      15. Gait instability  Assessment & Plan:  Continue with cane for ambulatory support      16. Anxiety state  Assessment & Plan:  Doing well with Zoloft      17. Bilateral chronic knee pain  -     Ambulatory Referral to Orthopedic Surgery    18. Ganglion cyst of joint of finger of right hand  Assessment & Plan:  R THUMB GANGLION    DOES NOT WANT SGY AT THIS TIME BUT WILL LET US KNOW IF/WHEN READY FOR HAND ORTHO REFERRAL.       19. Class 3 severe obesity due to excess calories with serious comorbidity and body mass index (BMI) of 40.0 to 44.9 in adult  Assessment & Plan:  Patient's (There is no height or weight on file to calculate BMI.) indicates that they are morbidly/severely obese (BMI > 40 or > 35 with obesity - related health condition) with health conditions that include hypertension, diabetes mellitus, dyslipidemias, and GERD . Weight is unchanged. BMI  is above average; BMI management plan is completed. We discussed portion control and increasing exercise.     Orders:  -     Ambulatory Referral to Weight Management Program             Follow Up   Return in about 6 months (around 12/28/2024) for Med recheck.  Patient was given instructions and counseling regarding her condition or for health maintenance advice. Please see specific information  pulled into the AVS if appropriate.            previously intubated - no problems

## 2024-06-28 NOTE — ASSESSMENT & PLAN NOTE
Negative workup for rheumatoid arthritis and inflammatory arthritis last year    Continue with Tylenol and use of cane for ambulatory support

## 2024-06-28 NOTE — ASSESSMENT & PLAN NOTE
Patient's (There is no height or weight on file to calculate BMI.) indicates that they are morbidly/severely obese (BMI > 40 or > 35 with obesity - related health condition) with health conditions that include hypertension, diabetes mellitus, dyslipidemias, and GERD . Weight is unchanged. BMI  is above average; BMI management plan is completed. We discussed portion control and increasing exercise.

## 2024-06-29 LAB
25(OH)D3+25(OH)D2 SERPL-MCNC: 52 NG/ML (ref 30–100)
ALBUMIN SERPL-MCNC: 4 G/DL (ref 3.8–4.8)
ALP SERPL-CCNC: 160 IU/L (ref 44–121)
ALT SERPL-CCNC: 16 IU/L (ref 0–32)
AST SERPL-CCNC: 17 IU/L (ref 0–40)
BILIRUB SERPL-MCNC: 0.7 MG/DL (ref 0–1.2)
BUN SERPL-MCNC: 22 MG/DL (ref 8–27)
BUN/CREAT SERPL: 17 (ref 12–28)
CALCIUM SERPL-MCNC: 9.2 MG/DL (ref 8.7–10.3)
CHLORIDE SERPL-SCNC: 104 MMOL/L (ref 96–106)
CHOLEST SERPL-MCNC: 132 MG/DL (ref 100–199)
CO2 SERPL-SCNC: 27 MMOL/L (ref 20–29)
CREAT SERPL-MCNC: 1.33 MG/DL (ref 0.57–1)
EGFRCR SERPLBLD CKD-EPI 2021: 43 ML/MIN/1.73
GLOBULIN SER CALC-MCNC: 2.3 G/DL (ref 1.5–4.5)
GLUCOSE SERPL-MCNC: 93 MG/DL (ref 70–99)
HBA1C MFR BLD: 5.5 % (ref 4.8–5.6)
HDLC SERPL-MCNC: 56 MG/DL
LDLC SERPL CALC-MCNC: 59 MG/DL (ref 0–99)
POTASSIUM SERPL-SCNC: 3.7 MMOL/L (ref 3.5–5.2)
PROT SERPL-MCNC: 6.3 G/DL (ref 6–8.5)
SODIUM SERPL-SCNC: 146 MMOL/L (ref 134–144)
T4 FREE SERPL-MCNC: 1.11 NG/DL (ref 0.82–1.77)
TRIGL SERPL-MCNC: 91 MG/DL (ref 0–149)
TSH SERPL DL<=0.005 MIU/L-ACNC: 0.64 UIU/ML (ref 0.45–4.5)
URATE SERPL-MCNC: 4.3 MG/DL (ref 3.1–7.9)
VLDLC SERPL CALC-MCNC: 17 MG/DL (ref 5–40)

## 2024-07-02 ENCOUNTER — TELEPHONE (OUTPATIENT)
Dept: CARDIOLOGY | Facility: CLINIC | Age: 74
End: 2024-07-02
Payer: MEDICARE

## 2024-07-16 DIAGNOSIS — I50.32 CHRONIC DIASTOLIC CONGESTIVE HEART FAILURE: Chronic | ICD-10-CM

## 2024-07-16 DIAGNOSIS — I10 ESSENTIAL HYPERTENSION: Chronic | ICD-10-CM

## 2024-07-16 DIAGNOSIS — I25.10 CORONARY ARTERY DISEASE INVOLVING NATIVE CORONARY ARTERY OF NATIVE HEART WITHOUT ANGINA PECTORIS: Chronic | ICD-10-CM

## 2024-07-16 RX ORDER — LOSARTAN POTASSIUM 100 MG/1
100 TABLET ORAL DAILY
Qty: 90 TABLET | Refills: 0 | OUTPATIENT
Start: 2024-07-16

## 2024-07-23 NOTE — TELEPHONE ENCOUNTER
Sotagliflozin (Inpefa) 200 MG tablet [[id number]] (Order [id number])  - PT NEEDS REFILL - [facility] SAYS COUPON IS ONLY GOOD ONCE - NEEDS PRIOR AUTH    SENT THROUGH [facility] AS WELL

## 2024-07-24 ENCOUNTER — OFFICE VISIT (OUTPATIENT)
Age: 74
End: 2024-07-24
Payer: MEDICARE

## 2024-07-24 VITALS
HEIGHT: 67 IN | DIASTOLIC BLOOD PRESSURE: 94 MMHG | SYSTOLIC BLOOD PRESSURE: 140 MMHG | BODY MASS INDEX: 40.93 KG/M2 | WEIGHT: 260.8 LBS

## 2024-07-24 DIAGNOSIS — I10 ESSENTIAL HYPERTENSION: Chronic | ICD-10-CM

## 2024-07-24 DIAGNOSIS — M25.561 PAIN IN BOTH KNEES, UNSPECIFIED CHRONICITY: Primary | ICD-10-CM

## 2024-07-24 DIAGNOSIS — E66.01 CLASS 3 SEVERE OBESITY DUE TO EXCESS CALORIES WITH SERIOUS COMORBIDITY AND BODY MASS INDEX (BMI) OF 40.0 TO 44.9 IN ADULT: ICD-10-CM

## 2024-07-24 DIAGNOSIS — Z96.653 STATUS POST BILATERAL KNEE REPLACEMENTS: ICD-10-CM

## 2024-07-24 DIAGNOSIS — M25.562 PAIN IN BOTH KNEES, UNSPECIFIED CHRONICITY: Primary | ICD-10-CM

## 2024-07-24 DIAGNOSIS — I25.10 CORONARY ARTERY DISEASE INVOLVING NATIVE CORONARY ARTERY OF NATIVE HEART WITHOUT ANGINA PECTORIS: Chronic | ICD-10-CM

## 2024-07-24 PROCEDURE — 3077F SYST BP >= 140 MM HG: CPT | Performed by: ORTHOPAEDIC SURGERY

## 2024-07-24 PROCEDURE — 1159F MED LIST DOCD IN RCRD: CPT | Performed by: ORTHOPAEDIC SURGERY

## 2024-07-24 PROCEDURE — 3080F DIAST BP >= 90 MM HG: CPT | Performed by: ORTHOPAEDIC SURGERY

## 2024-07-24 PROCEDURE — 1160F RVW MEDS BY RX/DR IN RCRD: CPT | Performed by: ORTHOPAEDIC SURGERY

## 2024-07-24 PROCEDURE — 99204 OFFICE O/P NEW MOD 45 MIN: CPT | Performed by: ORTHOPAEDIC SURGERY

## 2024-07-24 RX ORDER — SOTAGLIFLOZIN 200 MG/1
200 TABLET ORAL DAILY
Qty: 90 TABLET | Refills: 3 | Status: SHIPPED | OUTPATIENT
Start: 2024-07-24

## 2024-07-24 NOTE — PROGRESS NOTES
[facility] [place] Note        Subjective     Pain of the Left Knee and Pain of the Right Knee      HPI    Sabina Harkins is a 74 y.o. female.  She had her right knee replaced 10 years ago by Dr. Aiken at [place].  Her left knee replaced at [facility] 20 years ago by Dr. Clark.  Both knees started to hurt worse past 4 years.  She has had back surgery.  She was told she had hip problems.  At times the whole leg hurts.  The most of the pain is from the knee down left worse than right.  She is retired.  She is seated in a wheelchair today.  She has    high blood pressure and coronary artery disease  Past Medical History:   Diagnosis Date    Allergic rhinitis     Bladder disorder     Body mass index (BMI) of 40.0 to 44.9 in adult     CAD (coronary artery disease)     Chest pain     Chronic bilateral low back pain with sciatica     Chronic diastolic heart failure     Chronic systolic (congestive) heart failure     Complex dyslipidemia     Coronary artery disease involving native coronary artery of native heart without angina pectoris     Depressive disorder     Diastolic heart failure, stage B     Dyspnea     Essential hypertension     Gait abnormality     GERD without esophagitis     Gout     Heart disease     History of colonic polyps     Hyperglycemia     Hypokalemia     Idiopathic 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 tophus     Increased frequency of urination     Knee pain     LAD (linear IgA dermatosis)     Left ventricular diastolic dysfunction     Long term (current) use of opiate analgesic     Low back pain at multiple sites     Lumbar radiculitis     Mixed hyperlipidemia     Morbid obesity     Multiple vessel coronary artery disease     Myocardial infarction     Osteoarthritis of knee     Other chronic pain     Overactive bladder     Pure hypercholesterolemia     Seasonal allergies     SOB (shortness of breath)     Vitamin B12 deficiency     Vitamin D deficiency       Past  Surgical History:   Procedure Laterality Date    CARDIAC CATHETERIZATION      CARDIAC CATHETERIZATION N/A 06/04/2024    Procedure: Left Heart Cath;  Surgeon: Joe Wagner MD;  Location: [facility] CATH INVASIVE LOCATION;  Service: Cardiovascular;  Laterality: N/A;    COLONOSCOPY      CORONARY STENT PLACEMENT      JOINT REPLACEMENT Bilateral     left  >20 years   Right 10 years ago    LUMBAR FUSION      OTHER SURGICAL HISTORY      RESECTION OF SWEAT GLAND AND ACELOUS SKIN    TUBAL ABDOMINAL LIGATION Bilateral       Family History   Problem Relation Age of Onset    Colon cancer Mother     Stroke Father     Hypertension Sister     Cardiomyopathy Brother     Stroke Other     Diabetes type II Daughter     Diabetes type II Son      Social History     Socioeconomic History    Marital status:    Tobacco Use    Smoking status: Never    Smokeless tobacco: Never   Vaping Use    Vaping status: Never Used   Substance and Sexual Activity    Alcohol use: Yes     Comment: SOCIALLY- monthly    Drug use: Never    Sexual activity: Defer      Current Outpatient Medications on File Prior to Visit   Medication Sig Dispense Refill    allopurinol (ZYLOPRIM) 300 MG tablet Take 1 tablet by mouth Daily. For gout prevention 90 tablet 1    aspirin 81 MG EC tablet Take 1 tablet by mouth Daily. 90 tablet 1    atorvastatin (LIPITOR) 80 MG tablet Take 1 tablet by mouth Daily. 90 tablet 1    buPROPion XL (Wellbutrin XL) 150 MG 24 hr tablet Take 1 tablet by mouth Daily. For appetite and mood 90 tablet 1    carvedilol (COREG) 25 MG tablet Take 2 tablets by mouth 2 (Two) Times a Day With Meals. Take 50 mg bid 360 tablet 1    Cholecalciferol (Vitamin D) 50 MCG (2000 UT) capsule Take 2 caps po daily for vit D 180 capsule 1    clopidogrel (PLAVIX) 75 MG tablet Take 1 tablet by mouth Daily. 90 tablet 1    ezetimibe (ZETIA) 10 MG tablet Take 1 tablet by mouth Daily. 90 tablet 1    fesoterodine fumarate (TOVIAZ ER) 8 MG tablet sustained-release 24  hour tablet Take 1 tablet by mouth Daily. 90 tablet 1    losartan (COZAAR) 50 MG tablet Take 1 tablet by mouth Daily. for blood pressure 90 tablet 3    nitroglycerin (NITROSTAT) 0.4 MG SL tablet Place 1 tablet under the tongue Every 5 (Five) Minutes As Needed for Chest Pain. Take no more than 3 doses in 15 minutes. 30 tablet 3    pantoprazole (PROTONIX) 40 MG EC tablet Take 1 tablet by mouth 2 (Two) Times a Day. For acid reflux 180 tablet 1    polyethylene glycol (MIRALAX) 17 GM/SCOOP powder Take 17 g by mouth Daily.      promethazine (PHENERGAN) 25 MG tablet TAKE 1/2 TO 1 (ONE-HALF TO ONE) TABLET BY MOUTH EVERY 4 TO 6 HOURS AS NEEDED FOR NAUSEA CAUTION SEDATION      sertraline (ZOLOFT) 50 MG tablet Take 1 tablet by mouth Daily. For mood 90 tablet 1    torsemide (DEMADEX) 20 MG tablet Take 1 tablet by mouth Daily. 90 tablet 1    [DISCONTINUED] Sotagliflozin (Inpefa) 200 MG tablet Take 1 tablet by mouth Daily. 90 tablet 3     No current facility-administered medications on file prior to visit.      Allergies   Allergen Reactions    Codeine Unknown - High Severity     Pt had surgery and doctor believes she might be allergic to it. Patient is not sure what it does.    Nabumetone Unknown - High Severity    Gabapentin Hallucinations          Review of Systems   Constitutional: Negative.    HENT: Negative.     Eyes: Negative.    Respiratory: Negative.     Cardiovascular: Negative.    Gastrointestinal: Negative.    Endocrine: Negative.    Genitourinary: Negative.    Musculoskeletal:  Positive for arthralgias.   Skin: Negative.    Allergic/Immunologic: Negative.    Neurological: Negative.    Hematological: Negative.    Psychiatric/Behavioral: Negative.     All other systems reviewed and are negative.       I reviewed the patient's chief complaint, history of present illness, review of systems, past medical history, surgical history, family history, social history, medications and allergy list.        Objective      Physical  "Exam  /94   Ht 169.2 cm (66.61\")   Wt 118 kg (260 lb 12.8 oz)   BMI 41.32 kg/m²     Body mass index is 41.32 kg/m².    General  Mental Status - alert  General Appearance - cooperative, well groomed, not in acute distress  Orientation - Oriented X3  Build & Nutrition - well developed and well nourished  Posture - normal posture  Gait -seated in wheelchair       Ortho Exam  She is seated in wheelchair.  She has a negative straight leg raise bilaterally.  Both surgical knee incisions look good.  No acute abnormality.  No pain with hip range of motion.    Imaging/Studies Reviewed and Interpreted:  Imaging Results (Last 24 Hours)       Procedure Component Value Units Date/Time    XR Knee 3 View Bilateral [[id number]] Resulted: 07/24/24 1050     Updated: 07/24/24 1051    Narrative:      Bilateral Knee X-Rays  Indication: Pain    Upright AP, Lateral, skiers and Sunrise views of bilateral knees     Findings:  No fracture  Bilateral total knee arthroplasties without acute abnormality    No prior studies were available for comparison.              Assessment    Assessment:  1. Pain in both knees, unspecified chronicity    2. Status post bilateral knee replacements    3. Class 3 severe obesity due to excess calories with serious comorbidity and body mass index (BMI) of 40.0 to 44.9 in adult    4. Essential hypertension    5. Coronary artery disease involving native coronary artery of native heart without angina pectoris        Plan:  Continue over-the-counter medication as needed for discomfort  I will refer her to pain management for possible genicular nerve injections.  I would recommend a follow-up with Dr. Quintero after she is seen by pain management but she is not an operative candidate until she loses weight and gets her BMI below 40        Carlos Cartagena MD  07/24/24  11:06 EDT      Dictated Utilizing Dragon Dictation.    "

## 2024-07-30 ENCOUNTER — TELEPHONE (OUTPATIENT)
Dept: CARDIOLOGY | Facility: CLINIC | Age: 74
End: 2024-07-30
Payer: MEDICARE

## 2024-07-30 NOTE — TELEPHONE ENCOUNTER
[employer]  Outcome  Approved today by [facility] 2017  PA Case: [id number], Status: Approved, Coverage Starts on: 1/1/2024 12:00:00 AM, Coverage Ends on: 12/31/2024 12:00:00 AM. Questions? Contact [phone].

## 2024-08-16 ENCOUNTER — OFFICE VISIT (OUTPATIENT)
Dept: PAIN MEDICINE | Facility: CLINIC | Age: 74
End: 2024-08-16
Payer: MEDICARE

## 2024-08-16 VITALS — BODY MASS INDEX: 40.97 KG/M2 | HEIGHT: 67 IN | WEIGHT: 261 LBS

## 2024-08-16 DIAGNOSIS — G89.4 CHRONIC PAIN SYNDROME: Primary | ICD-10-CM

## 2024-08-16 DIAGNOSIS — M79.2 NEUROPATHIC PAIN: ICD-10-CM

## 2024-08-16 RX ORDER — OXYBUTYNIN CHLORIDE 10 MG/1
1 TABLET, EXTENDED RELEASE ORAL DAILY
COMMUNITY
Start: 2024-07-27

## 2024-08-16 NOTE — PROGRESS NOTES
Referring Physician: Carlos Cartagena MD  [address] [facility]  [address] [place] [address]    Primary Physician: Juan Ramon Valenzuela MD    CHIEF COMPLAINT or REASON FOR VISIT: Knee Pain (New patient/)      Initial history of present illness on 08/16/2024:  Ms. Sabina Harkins is 74 y.o. female who presents as a new patient referral for treatment of chronic bilateral knee pain.  She has previously undergone bilateral TKA 20 years ago of her left knee and 10 years ago for her right knee.  She had excellent relief from these procedures however she has noticed over the last several years increasing pain especially in her left knee.  She is very sensitive to light touch in the left knee does not wear pants very often due to this issue.  She has been evaluated by orthopedics, Dr. Cartagena, who referred for consideration of nonsurgical management.  She is additionally undergone ongoing evaluation with Dr. Quintero for potential revision however her BMI is 40.88.  She is interested in potential therapies.    Interval history:    Interventions:      Objective Pain Scoring:   BRIEF PAIN INVENTORY:  Total score:   Pain Score    08/16/24 1237   PainSc:   9   PainLoc: Knee      PHQ-2: PHQ-2 Total Score: 2  PHQ-9: PHQ-9: Brief Depression Severity Measure Score: 8  Opioid Risk Tool:         Review of Systems:   ROS negative except as otherwise noted     Past Medical History:   Past Medical History:   Diagnosis Date    Allergic rhinitis     Bladder disorder     Body mass index (BMI) of 40.0 to 44.9 in adult     CAD (coronary artery disease)     Chest pain     Chronic bilateral low back pain with sciatica     Chronic diastolic heart failure     Chronic pain disorder     Chronic systolic (congestive) heart failure     Complex dyslipidemia     Coronary artery disease involving native coronary artery of native heart without angina pectoris     Depressive disorder     Diastolic heart failure, stage B     Dyspnea     Essential  hypertension     Gait abnormality     GERD without esophagitis     Gout     Heart disease     History of colonic polyps     Hyperglycemia     Hypokalemia     Idiopathic 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 tophus     Increased frequency of urination     Knee pain     LAD (linear IgA dermatosis)     Left ventricular diastolic dysfunction     Long term (current) use of opiate analgesic     Low back pain at multiple sites     Lumbar radiculitis     Mixed hyperlipidemia     Morbid obesity     Multiple vessel coronary artery disease     Myocardial infarction     Osteoarthritis of knee     Other chronic pain     Overactive bladder     Pure hypercholesterolemia     Rheumatoid arthritis     Seasonal allergies     SOB (shortness of breath)     Spinal stenosis     Vitamin B12 deficiency     Vitamin D deficiency          Past Surgical History:   Past Surgical History:   Procedure Laterality Date    BACK SURGERY      CARDIAC CATHETERIZATION      CARDIAC CATHETERIZATION N/A 06/04/2024    Procedure: Left Heart Cath;  Surgeon: Joe Wagner MD;  Location: [facility] CATH INVASIVE LOCATION;  Service: Cardiovascular;  Laterality: N/A;    COLONOSCOPY      CORONARY STENT PLACEMENT      JOINT REPLACEMENT Bilateral     left  >20 years   Right 10 years ago    LUMBAR FUSION      OTHER SURGICAL HISTORY      RESECTION OF SWEAT GLAND AND ACELOUS SKIN    SPINE SURGERY      TUBAL ABDOMINAL LIGATION Bilateral          Family History   Family History   Problem Relation Age of Onset    Colon cancer Mother     Stroke Father     Hypertension Sister     Cardiomyopathy Brother     Stroke Other     Diabetes type II Daughter     Hypertension Daughter     Diabetes type II Son     Hypertension Son          Social History   Social History     Socioeconomic History    Marital status:    Tobacco Use    Smoking status: Never    Smokeless tobacco: Never   Vaping Use    Vaping status: Never Used   Substance and Sexual Activity    Alcohol use: Yes      Comment: SOCIALLY- monthly    Drug use: Never    Sexual activity: Defer        Medications:     Current Outpatient Medications:     allopurinol (ZYLOPRIM) 300 MG tablet, Take 1 tablet by mouth Daily. For gout prevention, Disp: 90 tablet, Rfl: 1    aspirin 81 MG EC tablet, Take 1 tablet by mouth Daily., Disp: 90 tablet, Rfl: 1    atorvastatin (LIPITOR) 80 MG tablet, Take 1 tablet by mouth Daily., Disp: 90 tablet, Rfl: 1    buPROPion XL (Wellbutrin XL) 150 MG 24 hr tablet, Take 1 tablet by mouth Daily. For appetite and mood, Disp: 90 tablet, Rfl: 1    carvedilol (COREG) 25 MG tablet, Take 2 tablets by mouth 2 (Two) Times a Day With Meals. Take 50 mg bid, Disp: 360 tablet, Rfl: 1    Cholecalciferol (Vitamin D) 50 MCG (2000 UT) capsule, Take 2 caps po daily for vit D, Disp: 180 capsule, Rfl: 1    clopidogrel (PLAVIX) 75 MG tablet, Take 1 tablet by mouth Daily., Disp: 90 tablet, Rfl: 1    ezetimibe (ZETIA) 10 MG tablet, Take 1 tablet by mouth Daily., Disp: 90 tablet, Rfl: 1    losartan (COZAAR) 50 MG tablet, Take 1 tablet by mouth Daily. for blood pressure, Disp: 90 tablet, Rfl: 3    nitroglycerin (NITROSTAT) 0.4 MG SL tablet, Place 1 tablet under the tongue Every 5 (Five) Minutes As Needed for Chest Pain. Take no more than 3 doses in 15 minutes., Disp: 30 tablet, Rfl: 3    oxybutynin XL (DITROPAN-XL) 10 MG 24 hr tablet, Take 1 tablet by mouth Daily., Disp: , Rfl:     pantoprazole (PROTONIX) 40 MG EC tablet, Take 1 tablet by mouth 2 (Two) Times a Day. For acid reflux, Disp: 180 tablet, Rfl: 1    polyethylene glycol (MIRALAX) 17 GM/SCOOP powder, Take 17 g by mouth Daily., Disp: , Rfl:     promethazine (PHENERGAN) 25 MG tablet, TAKE 1/2 TO 1 (ONE-HALF TO ONE) TABLET BY MOUTH EVERY 4 TO 6 HOURS AS NEEDED FOR NAUSEA CAUTION SEDATION, Disp: , Rfl:     sertraline (ZOLOFT) 50 MG tablet, Take 1 tablet by mouth Daily. For mood, Disp: 90 tablet, Rfl: 1    torsemide (DEMADEX) 20 MG tablet, Take 1 tablet by mouth Daily., Disp: 90  "tablet, Rfl: 1    fesoterodine fumarate (TOVIAZ ER) 8 MG tablet sustained-release 24 hour tablet, Take 1 tablet by mouth Daily., Disp: 90 tablet, Rfl: 1    Sotagliflozin (Inpefa) 200 MG tablet, Take 1 tablet by mouth Daily., Disp: 90 tablet, Rfl: 3        Physical Exam:     Vitals:    08/16/24 1237   Weight: 118 kg (261 lb)   Height: 170.2 cm (67\")   PainSc:   9   PainLoc: Knee        General: Alert and oriented, No acute distress.   HEENT: Normocephalic, atraumatic.   Cardiovascular: No gross edema, obese  Respiratory: Respirations are non-labored    Bilateral knee TKA incisions  Allodynia with light touch in the anterior and medial aspect of left knee    Sensory Exam: Hypersensitive to light touch in left knee    Neurologic: Cranial Nerves II-XII are grossly intact.   Psychiatric: Cooperative.   Gait: Normal   Assistive Devices: None    Imaging Studies:   No results found for this or any previous visit.        Independent review of radiographic imaging:     Impression & Plan:       08/16/2024: Sabina Harkins is a 74 y.o. female with past medical history significant for CAD, congestive heart failure, HTN, ERICA, GERD, depression, anxiety, hypokalemia, hyperglycemia, DKA who presents to the pain clinic for evaluation and treatment of chronic left greater than right bilateral knee pain.  Evaluation consistent with chronic left knee pain, chronic and right knee pain.  There are certainly neuropathic qualities to her left knee pain with significant allodynia with light touch.  Able to ascertain skin changes due to dark skin tone.  We discussed potential therapies including genicular nerve ablation, peripheral nerve stimulation.  She would like to proceed with Sprint PNS.    1. Chronic pain syndrome    2. Neuropathic pain        PLAN:  1. Medication Recommendations: Recommend Voltaren topical, NSAIDs, Tylenol.  Can trial turmeric 500 mg twice daily if NSAID contraindicated.    2. Physical Therapy: Continue HEP    3. " Psychological: defer    4. Complementary and alternative (CAM) Therapies:     5. Labs/Diagnostic studies: None indicated     6. Imaging: None indicated     7. Interventions: Left knee femoral and saphenous peripheral nerve stimulation with Sprint PNS (64555 x 2).  Consider left genicular nerve block, ablation if no benefit from PNS.    8. Referrals: None indicated     9. Records: Ortho notes reviewed    10. Lifestyle goals:    Follow-up 2 weeks after implant      [facility]  Karel Win MD          Quality Metrics:

## 2024-08-20 DIAGNOSIS — G89.4 CHRONIC PAIN SYNDROME: Primary | ICD-10-CM

## 2024-08-22 ENCOUNTER — OFFICE VISIT (OUTPATIENT)
Dept: ORTHOPEDIC SURGERY | Facility: CLINIC | Age: 74
End: 2024-08-22
Payer: MEDICARE

## 2024-08-22 ENCOUNTER — TELEPHONE (OUTPATIENT)
Dept: PAIN MEDICINE | Facility: CLINIC | Age: 74
End: 2024-08-22
Payer: MEDICARE

## 2024-08-22 VITALS
DIASTOLIC BLOOD PRESSURE: 90 MMHG | HEIGHT: 67 IN | SYSTOLIC BLOOD PRESSURE: 140 MMHG | BODY MASS INDEX: 40.97 KG/M2 | WEIGHT: 261 LBS

## 2024-08-22 DIAGNOSIS — M47.816 LUMBAR SPONDYLOSIS: ICD-10-CM

## 2024-08-22 DIAGNOSIS — G57.72 COMPLEX REGIONAL PAIN SYNDROME TYPE 2 OF BOTH LOWER EXTREMITIES: Primary | ICD-10-CM

## 2024-08-22 DIAGNOSIS — M25.562 PAIN IN BOTH KNEES, UNSPECIFIED CHRONICITY: ICD-10-CM

## 2024-08-22 DIAGNOSIS — G57.71 COMPLEX REGIONAL PAIN SYNDROME TYPE 2 OF BOTH LOWER EXTREMITIES: Primary | ICD-10-CM

## 2024-08-22 DIAGNOSIS — Z96.653 STATUS POST BILATERAL KNEE REPLACEMENTS: ICD-10-CM

## 2024-08-22 DIAGNOSIS — E66.01 CLASS 3 SEVERE OBESITY DUE TO EXCESS CALORIES WITH SERIOUS COMORBIDITY AND BODY MASS INDEX (BMI) OF 40.0 TO 44.9 IN ADULT: ICD-10-CM

## 2024-08-22 DIAGNOSIS — M25.561 PAIN IN BOTH KNEES, UNSPECIFIED CHRONICITY: ICD-10-CM

## 2024-08-22 PROCEDURE — 3077F SYST BP >= 140 MM HG: CPT | Performed by: ORTHOPAEDIC SURGERY

## 2024-08-22 PROCEDURE — 1159F MED LIST DOCD IN RCRD: CPT | Performed by: ORTHOPAEDIC SURGERY

## 2024-08-22 PROCEDURE — 1160F RVW MEDS BY RX/DR IN RCRD: CPT | Performed by: ORTHOPAEDIC SURGERY

## 2024-08-22 PROCEDURE — 99214 OFFICE O/P EST MOD 30 MIN: CPT | Performed by: ORTHOPAEDIC SURGERY

## 2024-08-22 PROCEDURE — 3080F DIAST BP >= 90 MM HG: CPT | Performed by: ORTHOPAEDIC SURGERY

## 2024-08-22 NOTE — PROGRESS NOTES
Orthopaedic Clinic Note: Knee New Patient    Chief Complaint   Patient presents with    Right Knee - Pain    Left Knee - Pain        HPI    Sabina Harkins is a 74 y.o. female who presents with bilateral knee pain for 34 year(s). Onset atraumatic and gradual in nature. Pain is localized to the anterior knee and is a 8/10 on the pain scale. Pain is described as dull, aching, burning, throbbing, stabbing, and shooting. Associated symptoms include pain, stiffness, numbness/tingling. The pain is worse with walking, sleeping, lying on affected side, and any movement of the joint; assistive device (cane/walker) make it better. Previous treatments have included: bracing, cane/walker, NSAIDS, physical therapy, and TKA bilateral knees. since symptom onset. Although some transient relief was reported with these interventions, these conservative measures have failed and symptoms have persisted. The patient is limited in daily activities and has had a significant decrease in quality of life as a result. She denies fevers, chills, or constitutional symptoms.    I have reviewed the following portions of the patient's history:History of Present Illness    Past Medical History:   Diagnosis Date    Allergic rhinitis     Arthritis of back     Bladder disorder     Body mass index (BMI) of 40.0 to 44.9 in adult     CAD (coronary artery disease)     Chest pain     Chronic bilateral low back pain with sciatica     Chronic diastolic heart failure     Chronic pain disorder     Chronic systolic (congestive) heart failure     Complex dyslipidemia     Coronary artery disease involving native coronary artery of native heart without angina pectoris     Depressive disorder     Diastolic heart failure, stage B     Dyspnea     Essential hypertension     Gait abnormality     GERD without esophagitis     Gout     Heart disease     Hip arthrosis     History of colonic polyps     Hyperglycemia     Hypokalemia     Idiopathic chronic gout of multiple sites  without tophus     Increased frequency of urination     Knee pain     LAD (linear IgA dermatosis)     Left ventricular diastolic dysfunction     Long term (current) use of opiate analgesic     Low back pain at multiple sites     Lumbar radiculitis     Mixed hyperlipidemia     Morbid obesity     Multiple vessel coronary artery disease     Myocardial infarction     Osteoarthritis of knee     Other chronic pain     Overactive bladder     Pure hypercholesterolemia     Rheumatoid arthritis     Seasonal allergies     SOB (shortness of breath)     Spinal stenosis     Vitamin B12 deficiency     Vitamin D deficiency       Past Surgical History:   Procedure Laterality Date    BACK SURGERY      CARDIAC CATHETERIZATION      CARDIAC CATHETERIZATION N/A 06/04/2024    Procedure: Left Heart Cath;  Surgeon: Joe Wagner MD;  Location: [facility] CATH INVASIVE LOCATION;  Service: Cardiovascular;  Laterality: N/A;    COLONOSCOPY      CORONARY STENT PLACEMENT      JOINT REPLACEMENT Bilateral     left  >20 years   Right 10 years ago    KNEE SURGERY      LUMBAR FUSION      OTHER SURGICAL HISTORY      RESECTION OF SWEAT GLAND AND ACELOUS SKIN    SPINE SURGERY      TUBAL ABDOMINAL LIGATION Bilateral       Family History   Problem Relation Age of Onset    Colon cancer Mother     Stroke Father     Hypertension Sister     Cardiomyopathy Brother     Stroke Other     Diabetes type II Daughter     Hypertension Daughter     Diabetes type II Son     Hypertension Son      Social History     Socioeconomic History    Marital status:    Tobacco Use    Smoking status: Never    Smokeless tobacco: Never   Vaping Use    Vaping status: Never Used   Substance and Sexual Activity    Alcohol use: Yes     Comment: SOCIALLY- monthly    Drug use: Never    Sexual activity: Defer      Current Outpatient Medications on File Prior to Visit   Medication Sig Dispense Refill    allopurinol (ZYLOPRIM) 300 MG tablet Take 1 tablet by mouth Daily. For gout  prevention 90 tablet 1    aspirin 81 MG EC tablet Take 1 tablet by mouth Daily. 90 tablet 1    atorvastatin (LIPITOR) 80 MG tablet Take 1 tablet by mouth Daily. 90 tablet 1    buPROPion XL (Wellbutrin XL) 150 MG 24 hr tablet Take 1 tablet by mouth Daily. For appetite and mood 90 tablet 1    carvedilol (COREG) 25 MG tablet Take 2 tablets by mouth 2 (Two) Times a Day With Meals. Take 50 mg bid 360 tablet 1    Cholecalciferol (Vitamin D) 50 MCG (2000 UT) capsule Take 2 caps po daily for vit D 180 capsule 1    clopidogrel (PLAVIX) 75 MG tablet Take 1 tablet by mouth Daily. 90 tablet 1    ezetimibe (ZETIA) 10 MG tablet Take 1 tablet by mouth Daily. 90 tablet 1    losartan (COZAAR) 50 MG tablet Take 1 tablet by mouth Daily. for blood pressure 90 tablet 3    nitroglycerin (NITROSTAT) 0.4 MG SL tablet Place 1 tablet under the tongue Every 5 (Five) Minutes As Needed for Chest Pain. Take no more than 3 doses in 15 minutes. 30 tablet 3    oxybutynin XL (DITROPAN-XL) 10 MG 24 hr tablet Take 1 tablet by mouth Daily.      pantoprazole (PROTONIX) 40 MG EC tablet Take 1 tablet by mouth 2 (Two) Times a Day. For acid reflux 180 tablet 1    polyethylene glycol (MIRALAX) 17 GM/SCOOP powder Take 17 g by mouth Daily.      promethazine (PHENERGAN) 25 MG tablet TAKE 1/2 TO 1 (ONE-HALF TO ONE) TABLET BY MOUTH EVERY 4 TO 6 HOURS AS NEEDED FOR NAUSEA CAUTION SEDATION      sertraline (ZOLOFT) 50 MG tablet Take 1 tablet by mouth Daily. For mood 90 tablet 1    torsemide (DEMADEX) 20 MG tablet Take 1 tablet by mouth Daily. 90 tablet 1    [DISCONTINUED] fesoterodine fumarate (TOVIAZ ER) 8 MG tablet sustained-release 24 hour tablet Take 1 tablet by mouth Daily. 90 tablet 1    [DISCONTINUED] Sotagliflozin (Inpefa) 200 MG tablet Take 1 tablet by mouth Daily. 90 tablet 3     No current facility-administered medications on file prior to visit.      Allergies   Allergen Reactions    Codeine Unknown - High Severity     Pt had surgery and doctor believes  "she might be allergic to it. Patient is not sure what it does.    Nabumetone Unknown - High Severity    Gabapentin Hallucinations        Review of Systems   Constitutional: Negative.    HENT: Negative.     Eyes: Negative.    Respiratory: Negative.     Cardiovascular: Negative.    Gastrointestinal: Negative.    Endocrine: Negative.    Genitourinary: Negative.    Musculoskeletal:  Positive for arthralgias.   Skin: Negative.    Allergic/Immunologic: Negative.    Neurological: Negative.    Hematological: Negative.    Psychiatric/Behavioral: Negative.          The patient's Review of Systems was personally reviewed and confirmed as accurate.    The following portions of the patient's history were reviewed and updated as appropriate: allergies, current medications, past family history, past medical history, past social history, past surgical history, and problem list.    Physical Exam  Blood pressure 140/90, height 170.2 cm (67.01\"), weight 118 kg (261 lb).    Body mass index is 40.87 kg/m².    GENERAL APPEARANCE: awake, alert & oriented x 3, in no acute distress and well developed, well nourished  PSYCH: normal affect  LUNGS:  breathing nonlabored  EYES: sclera anicteric  CARDIOVASCULAR: palpable dorsalis pedis, palpable posterior tibial bilaterally. Capillary refill less than 2 seconds  EXTREMITIES: no clubbing, cyanosis  GAIT:  Normal            Right Lower Extremity Exam:   ----------  Hip Exam  ----------  FLEXION CONTRACTURE: None  FLEXION: 110 degrees  INTERNAL ROTATION: 20 degrees at 90 degrees of flexion   EXTERNAL ROTATION: 40 degrees at 90 degrees of flexion    PAIN WITH HIP MOTION: no  ----------  Knee Exam  ----------  ALIGNMENT: neutral, no varus or valgus deformity     RANGE OF MOTION:  Normal (0-120 degrees) with no extensor lag or flexion contracture  LIGAMENTOUS STABILITY:   stable to varus and valgus stress at terminal extension and 30 degrees without any evidence of laxity     STRENGTH:  5/5 knee " flexion, extension. 5/5 ankle dorsiflexion and plantarflexion.     PAIN WITH PALPATION: denies tenderness to palpation about the knee, denies medial or lateral joint line pain  KNEE EFFUSION: no  PAIN WITH KNEE ROM: no  PATELLAR CREPITUS: no  SPECIAL EXAM FINDINGS:  Negative patellar compression    REFLEXES:  PATELLAR 2+/4  ACHILLES 2+/4    CLONUS: negative  STRAIGHT LEG TEST:   negative    SENSATION TO LIGHT TOUCH:  DEEP PERONEAL/SUPERFICIAL PERONEAL/SURAL/SAPHENOUS/TIBIAL:   Dysesthesias overlying the jessi-incisional skin.    EDEMA:  no  ERYTHEMA:  no  WOUNDS/INCISIONS: no overlying skin problems.  Well-healed anterior knee incision      Left Lower Extremity Exam:   ----------  Hip Exam  ----------  FLEXION CONTRACTURE: None  FLEXION: 110 degrees  INTERNAL ROTATION: 20 degrees at 90 degrees of flexion   EXTERNAL ROTATION: 40 degrees at 90 degrees of flexion    PAIN WITH HIP MOTION: no  ----------  Knee Exam  ----------  ALIGNMENT: neutral, no varus or valgus deformity     RANGE OF MOTION:  Normal (0-120 degrees) with no extensor lag or flexion contracture  LIGAMENTOUS STABILITY:   stable to varus and valgus stress at terminal extension and 30 degrees without any evidence of laxity     STRENGTH:  5/5 knee flexion, extension. 5/5 ankle dorsiflexion and plantarflexion.     PAIN WITH PALPATION: denies tenderness to palpation about the knee, denies medial or lateral joint line pain  KNEE EFFUSION: no  PAIN WITH KNEE ROM: no  PATELLAR CREPITUS: no  SPECIAL EXAM FINDINGS:  Negative patellar compression    REFLEXES:  PATELLAR 2+/4  ACHILLES 2+/4    CLONUS: negative  STRAIGHT LEG TEST:   negative    SENSATION TO LIGHT TOUCH:  DEEP PERONEAL/SUPERFICIAL PERONEAL/SURAL/SAPHENOUS/TIBIAL:   Dysesthesias overlying the jessi-incisional skin.    EDEMA:  no  ERYTHEMA:  no  WOUNDS/INCISIONS: no overlying skin problems.  Well-healed anterior knee  incision    ______________________________________________________________________  ______________________________________________________________________    RADIOGRAPHIC FINDINGS:   Bilateral knee radiographs from 7/24/2024 were personally reviewed and interpreted.  Radiographs demonstrate well-positioned total knee arthroplasty construct bilaterally with no evidence of component loosening, subsidence, failure.    Assessment/Plan:   Diagnosis Plan   1. Complex regional pain syndrome type 2 of both lower extremities        2. Pain in both knees, unspecified chronicity        3. Status post bilateral knee replacements        4. Class 3 severe obesity due to excess calories with serious comorbidity and body mass index (BMI) of 40.0 to 44.9 in adult        5. Lumbar spondylosis          I discussed with the patient that based on her clinical and radiographic exam, I see no indication that there is a problem with either total joint arthroplasty.  She does have dysesthesias in the jessi-incisional area of both knees.  I suspect that the majority of her pain is likely coming from a regional pain syndrome phenomenon.  I do not believe she would benefit from further surgical intervention for her total knee arthroplasties as they do appear to be functioning well mechanically.  Not only does her total knee arthroplasty constructs appear to be functioning well on exam, any further surgical intervention is likely to exacerbate her neurogenic pain.  I recommended weight loss as well to decrease joint reaction forces in her knees.  I do suspect she also has a component of pain radiating from her back.  I recommended she follow-up with a back specialist for further workup of her back as this could be contributing to her lower extremity pain.  She will follow-up with me as needed.    Patient has an elevated BMI. The patient has been instructed on various weight loss avenues including diet, portion control, calorie restriction,  low/no impact exercise, referral to weight loss management and/or bariatric surgery.  It was explained that weight loss can improve joint pain alone by decreasing the joint reaction forces.  For every pound of weight change, the knee and hip joints see a 4 to 5 fold change in pressure.  Given these options, the patient will proceed with low calorie diet and low impact exercise.    Juan Ramon Quintero MD  08/22/24  11:21 EDT

## 2024-08-22 NOTE — TELEPHONE ENCOUNTER
Caller: YORDY   Relationship to Patient: SELF  Phone Number: [phone] (home)     Reason for Call: PATIENT CALLED DUE TO BEING TOLD BY THE  FOR HER SCS TO LET DR EDEN KNOW THAT SHE IS CURRENTLY ON PLAVIX

## 2024-08-22 NOTE — TELEPHONE ENCOUNTER
Surgery/Procedure:  Left knee femoral and saphenous peripheral nerve stimulation with [employer] PNS   Surgery/Procedure Date:  09/10/2024  Last visit:   Office Visit with Karel Win MD (08/16/2024)   Next visit: Appointment with Adriana Augustin PA-C (09/24/2024)      Reason for call:    Patient is currently on Plavix, she wants to know if she needs to hold it for the procedure?

## 2024-09-10 ENCOUNTER — OUTSIDE FACILITY SERVICE (OUTPATIENT)
Dept: PAIN MEDICINE | Facility: CLINIC | Age: 74
End: 2024-09-10
Payer: MEDICARE

## 2024-09-10 ENCOUNTER — DOCUMENTATION (OUTPATIENT)
Dept: PAIN MEDICINE | Facility: CLINIC | Age: 74
End: 2024-09-10

## 2024-09-10 PROCEDURE — 99152 MOD SED SAME PHYS/QHP 5/>YRS: CPT | Performed by: STUDENT IN AN ORGANIZED HEALTH CARE EDUCATION/TRAINING PROGRAM

## 2024-09-10 PROCEDURE — 64555 IMPLANT NEUROELECTRODES: CPT | Performed by: STUDENT IN AN ORGANIZED HEALTH CARE EDUCATION/TRAINING PROGRAM

## 2024-09-10 PROCEDURE — 76942 ECHO GUIDE FOR BIOPSY: CPT | Performed by: STUDENT IN AN ORGANIZED HEALTH CARE EDUCATION/TRAINING PROGRAM

## 2024-09-10 NOTE — PROGRESS NOTES
[facility]  [address] [place] [address]       PROCEDURE: Ultrasound-guided LEFT femoral and saphenous nerve peripheral nerve stimulator implant with [employer] PNS  PRE-OP DIAGNOSIS: Knee osteoarthritis  POST-OP DIAGNOSIS: Knee osteoarthritis    ANTIPLATELET/ANTICOAGULANT STOP DATE: Discussed with the patient and managed according to SHANELLE guidelines.    CONSENT: Risks, benefits and options were explained to the patient, all questions were answered and written informed consent was obtained.    ANESTHESIA: Moderate sedation was required to maintain comfort, safety, and cooperation during the procedure.  The duration of sedation service was over 10 minutes.  Patient received 1mg IV Versed and 50mcg IV fentanyl.  Independent observation and monitoring was performed by Mala Marroquin.  The patient's level of consciousness and physiologic status was continually monitored with pulse oximetry, EKG from 1245 to 1332.  There were no complications or adverse events during sedation.  After the sedation patient was taken to the recovery area.    PROCEDURE NOTE:  After the risks, benefits and alternatives were discussed with the patient and consent was obtained, patient was placed in the prone position and padded to foster comfort. Prior to delivery of anesthetics, the painful region was carefully outlined with a marker. Appropriate skin and bony landmarks were identified.  The left medial thigh was prepped and draped in sterile fashion.  A linear ultrasound probe was used to identify the sartorius muscle and the underlying saphenous artery adjacent to the saphenous nerve.  Then, the skin around the planned entry point and the subcutaneous tissues are injected with local anesthetic.     A percutaneous sleeve and stimulating probe lead introduction system were assembled, inserted and advanced down using in-plane technique to the femoral nerve as it courses adjacent to the saphenous  artery.  The introducer needle was delivered to a location in proximity to the nerve.     Multiple stimulation parameters were used to deliver stimulation to the femoral nerve in concert with stimulating at multiple positions around the nerve. Nerve target acquisition was confirmed noting generation of paresthesias in the left knee. Various electrical parameter combinations were tested, and the lead location was adjusted (physically relocated) until the patient indicated paresthesia or muscle tension overlapping the distribution of the patient’s typical region of pain.     The stimulating probe was removed from the introducer and a percutaneous lead was guided through the needle and delivered to a location in similar proximity to the nerve. Final location was verified with electrical stimulation and documented with ultrasound.     The introducer needle was removed, and the exposed end of the percutaneous lead was attached to an external stimulator unit. Various electrical parameter combinations were again tested until the patient indicated paresthesia or muscle tension overlapping the distribution of the patient’s typical region of pain.     After confirming that lead impedance was in the normal range, the external stimulator unit was detached, the needle was removed, and the lead was anchored at the skin.     A second stimulator lead was then advanced using the same technique in a more cephalad position along the course of the femoral nerve.     The lead was threaded into the connector block and electrical continuity and desired patient response was confirmed. The connector block was attached to the external stimulator unit.     The site was covered with a sterile occlusive dressing and a fluoroscopic image was taken to document final placement. The patient was observed for stability of vital signs and comfort.    EBL: None    COMPLICATIONS: None       Vital signs remained stable throughout the procedure and in the  recovery area. There were no immediate complications and the patient tolerated the procedure well.     FOLLOW UP: As scheduled    ADDITIONAL NOTES:      [facility]     Karel Win MD       Codes:  64555 x2  [id number]  [id number]

## 2024-09-24 ENCOUNTER — OFFICE VISIT (OUTPATIENT)
Dept: PAIN MEDICINE | Facility: CLINIC | Age: 74
End: 2024-09-24
Payer: MEDICARE

## 2024-09-24 ENCOUNTER — OFFICE VISIT (OUTPATIENT)
Dept: CARDIOLOGY | Facility: CLINIC | Age: 74
End: 2024-09-24
Payer: MEDICARE

## 2024-09-24 VITALS
WEIGHT: 263 LBS | HEART RATE: 68 BPM | DIASTOLIC BLOOD PRESSURE: 70 MMHG | BODY MASS INDEX: 41.28 KG/M2 | RESPIRATION RATE: 18 BRPM | SYSTOLIC BLOOD PRESSURE: 118 MMHG | HEIGHT: 67 IN | OXYGEN SATURATION: 99 %

## 2024-09-24 VITALS — HEIGHT: 67 IN | WEIGHT: 263 LBS | BODY MASS INDEX: 41.28 KG/M2

## 2024-09-24 DIAGNOSIS — I50.32 CHRONIC DIASTOLIC CONGESTIVE HEART FAILURE: Chronic | ICD-10-CM

## 2024-09-24 DIAGNOSIS — E78.2 HYPERLIPIDEMIA, MIXED: ICD-10-CM

## 2024-09-24 DIAGNOSIS — I25.10 CORONARY ARTERY DISEASE INVOLVING NATIVE CORONARY ARTERY OF NATIVE HEART WITHOUT ANGINA PECTORIS: Primary | Chronic | ICD-10-CM

## 2024-09-24 DIAGNOSIS — M79.2 NEUROPATHIC PAIN: ICD-10-CM

## 2024-09-24 DIAGNOSIS — I10 ESSENTIAL HYPERTENSION: Chronic | ICD-10-CM

## 2024-09-24 DIAGNOSIS — G89.4 CHRONIC PAIN SYNDROME: Primary | ICD-10-CM

## 2024-09-24 DIAGNOSIS — G47.33 OSA (OBSTRUCTIVE SLEEP APNEA): Chronic | ICD-10-CM

## 2024-09-24 PROCEDURE — 99212 OFFICE O/P EST SF 10 MIN: CPT

## 2024-09-24 PROCEDURE — 1160F RVW MEDS BY RX/DR IN RCRD: CPT | Performed by: INTERNAL MEDICINE

## 2024-09-24 PROCEDURE — 1159F MED LIST DOCD IN RCRD: CPT | Performed by: INTERNAL MEDICINE

## 2024-09-24 PROCEDURE — 3074F SYST BP LT 130 MM HG: CPT | Performed by: INTERNAL MEDICINE

## 2024-09-24 PROCEDURE — 1160F RVW MEDS BY RX/DR IN RCRD: CPT

## 2024-09-24 PROCEDURE — 93000 ELECTROCARDIOGRAM COMPLETE: CPT | Performed by: INTERNAL MEDICINE

## 2024-09-24 PROCEDURE — G2211 COMPLEX E/M VISIT ADD ON: HCPCS

## 2024-09-24 PROCEDURE — 99214 OFFICE O/P EST MOD 30 MIN: CPT | Performed by: INTERNAL MEDICINE

## 2024-09-24 PROCEDURE — 1125F AMNT PAIN NOTED PAIN PRSNT: CPT

## 2024-09-24 PROCEDURE — 1159F MED LIST DOCD IN RCRD: CPT

## 2024-09-24 PROCEDURE — 3078F DIAST BP <80 MM HG: CPT | Performed by: INTERNAL MEDICINE

## 2024-09-24 RX ORDER — SPIRONOLACTONE 25 MG/1
25 TABLET ORAL DAILY
Qty: 90 TABLET | Refills: 3 | Status: SHIPPED | OUTPATIENT
Start: 2024-09-24

## 2024-09-25 ENCOUNTER — TELEPHONE (OUTPATIENT)
Dept: CARDIOLOGY | Facility: CLINIC | Age: 74
End: 2024-09-25
Payer: MEDICARE

## 2024-09-25 LAB
BUN SERPL-MCNC: 12 MG/DL (ref 8–27)
BUN/CREAT SERPL: 12 (ref 12–28)
CALCIUM SERPL-MCNC: 9.1 MG/DL (ref 8.7–10.3)
CHLORIDE SERPL-SCNC: 110 MMOL/L (ref 96–106)
CO2 SERPL-SCNC: 21 MMOL/L (ref 20–29)
CREAT SERPL-MCNC: 1 MG/DL (ref 0.57–1)
EGFRCR SERPLBLD CKD-EPI 2021: 60 ML/MIN/1.73
GLUCOSE SERPL-MCNC: 96 MG/DL (ref 70–99)
POTASSIUM SERPL-SCNC: 4.3 MMOL/L (ref 3.5–5.2)
SODIUM SERPL-SCNC: 143 MMOL/L (ref 134–144)

## 2024-09-26 ENCOUNTER — TELEPHONE (OUTPATIENT)
Dept: CARDIOLOGY | Facility: CLINIC | Age: 74
End: 2024-09-26
Payer: MEDICARE

## 2024-09-30 RX ORDER — SOTAGLIFLOZIN 200 MG/1
200 TABLET ORAL DAILY
Qty: 90 TABLET | Refills: 3 | OUTPATIENT
Start: 2024-09-30

## 2024-10-08 ENCOUNTER — TELEPHONE (OUTPATIENT)
Dept: PAIN MEDICINE | Facility: CLINIC | Age: 74
End: 2024-10-08
Payer: MEDICARE

## 2024-10-08 NOTE — TELEPHONE ENCOUNTER
Hub staff attempted to follow warm transfer process and was unsuccessful     Caller: Sabina Harkins    Relationship to patient: Self    Best call back number: [phone] (home)       Patient is needing: PATIENT WANTS TO BE SCHEDULED FOR THE [place] OFFICE SO THE OFFICE TO CHECK HER STIMULATOR.       PATIENT IS HAVING SOME PAIN AND WANTS TO KNOW IF IT CAN BE MOVED AROUND A BIT. SHE REQUESTS A CALLBACK FROM CLINICAL STAFF.

## 2024-10-09 NOTE — TELEPHONE ENCOUNTER
Hub staff attempted to follow warm transfer process and was unsuccessful     Caller: Sabina Harkins    Relationship to patient: Self    Best call back number: [phone] (home)       Patient is needing: PATIENT RETURNING CALL

## 2024-10-10 NOTE — TELEPHONE ENCOUNTER
S/w patient, advised her that the first step to answering her questions would be to speak with the [employer] rep. Sent a message to Ortiz Irizarry requesting he have someone contact the patient.

## 2024-10-29 DIAGNOSIS — I50.32 CHRONIC DIASTOLIC CONGESTIVE HEART FAILURE: Chronic | ICD-10-CM

## 2024-10-29 DIAGNOSIS — I25.10 CORONARY ARTERY DISEASE INVOLVING NATIVE CORONARY ARTERY OF NATIVE HEART WITHOUT ANGINA PECTORIS: Chronic | ICD-10-CM

## 2024-10-29 DIAGNOSIS — I10 ESSENTIAL HYPERTENSION: Chronic | ICD-10-CM

## 2024-10-29 RX ORDER — CARVEDILOL 25 MG/1
50 TABLET ORAL 2 TIMES DAILY WITH MEALS
Qty: 360 TABLET | Refills: 0 | Status: SHIPPED | OUTPATIENT
Start: 2024-10-29

## 2024-11-11 ENCOUNTER — OFFICE VISIT (OUTPATIENT)
Dept: PAIN MEDICINE | Facility: CLINIC | Age: 74
End: 2024-11-11
Payer: MEDICARE

## 2024-11-11 VITALS — BODY MASS INDEX: 40.62 KG/M2 | HEIGHT: 67 IN | WEIGHT: 258.8 LBS

## 2024-11-11 DIAGNOSIS — G89.4 CHRONIC PAIN SYNDROME: ICD-10-CM

## 2024-11-11 DIAGNOSIS — G89.29 CHRONIC PAIN OF LEFT KNEE: ICD-10-CM

## 2024-11-11 DIAGNOSIS — M25.562 CHRONIC PAIN OF LEFT KNEE: Primary | ICD-10-CM

## 2024-11-11 DIAGNOSIS — M25.562 CHRONIC PAIN OF LEFT KNEE: ICD-10-CM

## 2024-11-11 DIAGNOSIS — G89.29 CHRONIC PAIN OF LEFT KNEE: Primary | ICD-10-CM

## 2024-11-11 DIAGNOSIS — G89.4 CHRONIC PAIN SYNDROME: Primary | ICD-10-CM

## 2024-11-11 RX ORDER — FESOTERODINE FUMARATE 8 MG/1
8 TABLET, FILM COATED, EXTENDED RELEASE ORAL DAILY
COMMUNITY
Start: 2024-10-26

## 2024-11-11 NOTE — PROGRESS NOTES
Referring Physician: No referring provider defined for this encounter.    Primary Physician: Juan Ramon Valenzuela MD    CHIEF COMPLAINT or REASON FOR VISIT: Follow-up (60 Day Sprint Follow Up) and Chronic pain syndrome      Initial history of present illness on 08/16/2024:  Ms. Sabina Harkins is 74 y.o. female who presents as a new patient referral for treatment of chronic bilateral knee pain.  She has previously undergone bilateral TKA 20 years ago of her left knee and 10 years ago for her right knee.  She had excellent relief from these procedures however she has noticed over the last several years increasing pain especially in her left knee.  She is very sensitive to light touch in the left knee does not wear pants very often due to this issue.  She has been evaluated by orthopedics, Dr. Cartagena, who referred for consideration of nonsurgical management.  She is additionally undergone ongoing evaluation with Dr. Quintero for potential revision however her BMI is 40.88.  She is interested in potential therapies.    Interval history:  Patient returns to clinic today for peripheral nerve stimulator lead pull.  Overall, patient reports good relief of her pre-existing left medial knee pain extending down into her left shin.  There is a significant improvement in the symptoms.  Over the course of the stimulator she had noticed bilateral knee pain.  This is particularly bothersome for her.  She denies any new injuries or events since her previous appointment.  She is interested in additional strategies to help alleviate this pain.  Of importance, during peripheral nerve stimulator lead pull 1 peripheral nerve stimulator lead was fractured.  Explained to patient that she now has a foreign device that was broken and remained in her leg.  She voiced understanding.  Additionally, I informed her that she may still get MRIs under certain conditions and she is to tell any radiology that she had a lead fracture from this device.   She was given appropriate documentation supporting this statement.  For 1.5T MRI scanner: Scan for up to 7 minutes for landmarks above the knee and 12-minute deneen for below the knee.  Wait a minimum of 4 minutes before the next imaging session.    First 3T MR scanner: Scan up for 5 minutes for landmarks above the knee and 11 for landmarks below the knee.  Wait a minimum of 4 minutes before the next imaging session.    Interventions:  9/10/2024: Left femoral and saphenous nerve PNS 80% relief of medial knee pain    Objective Pain Scoring:   BRIEF PAIN INVENTORY:  Total score:   Pain Score    11/11/24 1319   PainSc:   3   PainLoc: Leg  Comment: Right      PHQ-2:    PHQ-9:    Opioid Risk Tool:         Review of Systems:   ROS negative except as otherwise noted     Past Medical History:   Past Medical History:   Diagnosis Date    Allergic rhinitis     Arthritis of back     Bladder disorder     Body mass index (BMI) of 40.0 to 44.9 in adult     CAD (coronary artery disease)     Chest pain     Chronic bilateral low back pain with sciatica     Chronic diastolic heart failure     Chronic pain disorder     Chronic systolic (congestive) heart failure     Complex dyslipidemia     Coronary artery disease involving native coronary artery of native heart without angina pectoris     Depressive disorder     Diastolic heart failure, stage B     Dyspnea     Essential hypertension     Gait abnormality     GERD without esophagitis     Gout     Heart disease     Hip arthrosis     History of colonic polyps     Hyperglycemia     Hypokalemia     Idiopathic 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 tophus     Increased frequency of urination     Joint pain     Knee pain     LAD (linear IgA dermatosis)     Left ventricular diastolic dysfunction     Long term (current) use of opiate analgesic     Low back pain at multiple sites     Lumbar radiculitis     Mixed hyperlipidemia     Morbid obesity     Multiple vessel coronary artery disease      Myocardial infarction     Osteoarthritis of knee     Other chronic pain     Overactive bladder     Pure hypercholesterolemia     Rheumatoid arthritis     Seasonal allergies     SOB (shortness of breath)     Spinal stenosis     Vitamin B12 deficiency     Vitamin D deficiency          Past Surgical History:   Past Surgical History:   Procedure Laterality Date    BACK SURGERY      CARDIAC CATHETERIZATION      CARDIAC CATHETERIZATION N/A 06/04/2024    Procedure: Left Heart Cath;  Surgeon: Joe Wagner MD;  Location: [facility] CATH INVASIVE LOCATION;  Service: Cardiovascular;  Laterality: N/A;    COLONOSCOPY      CORONARY STENT PLACEMENT      JOINT REPLACEMENT Bilateral     left  >20 years   Right 10 years ago    KNEE SURGERY      LUMBAR FUSION      ORTHOPEDIC SURGERY      OTHER SURGICAL HISTORY      RESECTION OF SWEAT GLAND AND ACELOUS SKIN    SPINE SURGERY      TUBAL ABDOMINAL LIGATION Bilateral          Family History   Family History   Problem Relation Age of Onset    Colon cancer Mother     Stroke Father     Hypertension Sister     Cardiomyopathy Brother     Stroke Other     Diabetes type II Daughter     Hypertension Daughter     Diabetes type II Son     Hypertension Son          Social History   Social History     Socioeconomic History    Marital status:    Tobacco Use    Smoking status: Never    Smokeless tobacco: Never   Vaping Use    Vaping status: Never Used   Substance and Sexual Activity    Alcohol use: Yes     Comment: SOCIALLY- monthly    Drug use: Never    Sexual activity: Defer        Medications:     Current Outpatient Medications:     allopurinol (ZYLOPRIM) 300 MG tablet, Take 1 tablet by mouth Daily. For gout prevention, Disp: 90 tablet, Rfl: 1    aspirin 81 MG EC tablet, Take 1 tablet by mouth Daily., Disp: 90 tablet, Rfl: 1    atorvastatin (LIPITOR) 80 MG tablet, Take 1 tablet by mouth Daily., Disp: 90 tablet, Rfl: 1    buPROPion XL (Wellbutrin XL) 150 MG 24 hr tablet, Take 1 tablet by  "mouth Daily. For appetite and mood, Disp: 90 tablet, Rfl: 1    carvedilol (COREG) 25 MG tablet, TAKE 2 TABLETS BY MOUTH TWICE DAILY WITH MEALS, Disp: 360 tablet, Rfl: 0    Cholecalciferol (Vitamin D) 50 MCG (2000 UT) capsule, Take 2 caps po daily for vit D, Disp: 180 capsule, Rfl: 1    clopidogrel (PLAVIX) 75 MG tablet, Take 1 tablet by mouth Daily., Disp: 90 tablet, Rfl: 1    ezetimibe (ZETIA) 10 MG tablet, Take 1 tablet by mouth Daily., Disp: 90 tablet, Rfl: 1    fesoterodine fumarate (TOVIAZ ER) 8 MG tablet sustained-release 24 hour tablet, Take 1 tablet by mouth Daily., Disp: , Rfl:     losartan (COZAAR) 50 MG tablet, Take 1 tablet by mouth Daily. for blood pressure, Disp: 90 tablet, Rfl: 3    nitroglycerin (NITROSTAT) 0.4 MG SL tablet, Place 1 tablet under the tongue Every 5 (Five) Minutes As Needed for Chest Pain. Take no more than 3 doses in 15 minutes., Disp: 30 tablet, Rfl: 3    pantoprazole (PROTONIX) 40 MG EC tablet, Take 1 tablet by mouth 2 (Two) Times a Day. For acid reflux, Disp: 180 tablet, Rfl: 1    polyethylene glycol (MIRALAX) 17 GM/SCOOP powder, Take 17 g by mouth Daily., Disp: , Rfl:     promethazine (PHENERGAN) 25 MG tablet, TAKE 1/2 TO 1 (ONE-HALF TO ONE) TABLET BY MOUTH EVERY 4 TO 6 HOURS AS NEEDED FOR NAUSEA CAUTION SEDATION, Disp: , Rfl:     sertraline (ZOLOFT) 50 MG tablet, Take 1 tablet by mouth Daily. For mood, Disp: 90 tablet, Rfl: 1    spironolactone (ALDACTONE) 25 MG tablet, Take 1 tablet by mouth Daily., Disp: 90 tablet, Rfl: 3    torsemide (DEMADEX) 20 MG tablet, Take 1 tablet by mouth Daily., Disp: 90 tablet, Rfl: 1        Physical Exam:     Vitals:    11/11/24 1319   Weight: 117 kg (258 lb 12.8 oz)   Height: 170.2 cm (67.01\")   PainSc:   3   PainLoc: Leg  Comment: Right        General: Alert and oriented, No acute distress.   HEENT: Normocephalic, atraumatic.   Cardiovascular: No gross edema, obese  Respiratory: Respirations are non-labored    Bilateral knee TKA " incisions  Allodynia with light touch in the anterior and medial aspect of left knee    Sensory Exam: Hypersensitive to light touch in left knee    Neurologic: Cranial Nerves II-XII are grossly intact.   Psychiatric: Cooperative.   Gait: Normal   Assistive Devices: None    2 peripheral nerve stimulator leads were removed at today's appointment.  There was a lead fracture when removing one of the peripheral nerve stimulator leads.  There is no signs of surrounding erythema, inflammation, purulent drainage, or induration.  Patient was educated appropriately on Effexor.    Imaging Studies:   No results found for this or any previous visit.        Independent review of radiographic imaging:     Impression & Plan:       08/16/2024: Sabina Harkins is a 74 y.o. female with past medical history significant for CAD, congestive heart failure, HTN, ERICA, GERD, depression, anxiety, hypokalemia, hyperglycemia, DKA who presents to the pain clinic for evaluation and treatment of chronic left greater than right bilateral knee pain.  Evaluation consistent with chronic left knee pain, chronic and right knee pain.  There are certainly neuropathic qualities to her left knee pain with significant allodynia with light touch.  Able to ascertain skin changes due to dark skin tone.  We discussed potential therapies including genicular nerve ablation, peripheral nerve stimulation.  She would like to proceed with Sprint PNS.  9/24/2024: 2 weeks s/p Sprint PNS for left knee pain.  Doing well thus far.  No new complaints.  Will follow-up for lead pull  11/11/2024: Good relief of medial knee pain from peripheral nerve stimulator with Sprint PNS.  Did experience one lead fracture.  Patient was educated appropriately and provided documentation regarding lead fracture.  Will plan for left genicular nerve blocks for ongoing lateral knee pain.    1. Chronic pain syndrome    2. Chronic pain of left knee            PLAN:  1. Medication Recommendations:  Recommend Voltaren topical, NSAIDs, Tylenol.  Can trial turmeric 500 mg twice daily if NSAID contraindicated.    2. Physical Therapy: Continue HEP    3. Psychological: defer    4. Complementary and alternative (CAM) Therapies:     5. Labs/Diagnostic studies: None indicated     6. Imaging: Patient did experience lead fracture.  Below is the recommendation for MRIs.  For 1.5T MRI scanner: Scan for up to 7 minutes for landmarks above the knee and 12-minute deneen for below the knee.  Wait a minimum of 4 minutes before the next imaging session.    First 3T MR scanner: Scan up for 5 minutes for landmarks above the knee and 11 for landmarks below the knee.  Wait a minimum of 4 minutes before the next imaging session.    7. Interventions: Schedule left knee genicular nerve blocks (20561).  Patient did experience a lead fracture from peripheral nerve stimulator.  If greater than 50% relief then may proceed with left knee genicular nerve ablation.    8. Referrals: None indicated     9. Records:     10. Lifestyle goals:    Follow-up 4 months      [facility]  Adriana Augustin PA-C          Quality Metrics:

## 2024-12-05 ENCOUNTER — OUTSIDE FACILITY SERVICE (OUTPATIENT)
Dept: PAIN MEDICINE | Facility: CLINIC | Age: 74
End: 2024-12-05
Payer: MEDICARE

## 2024-12-05 ENCOUNTER — DOCUMENTATION (OUTPATIENT)
Dept: PAIN MEDICINE | Facility: CLINIC | Age: 74
End: 2024-12-05

## 2024-12-05 NOTE — PROGRESS NOTES
[facility]  [address] [place] [address]      PROCEDURE: Fluoroscopically-guided Left Genicular Nerve Blocks targeting the superomedial, superolateral and inferomedial genicular nerves     PRE-OP DIAGNOSIS: Knee osteoarthritis  POST-OP DIAGNOSIS: Knee osteoarthritis     CONSENT: Risks, benefits and options were explained to the patient, all questions were answered and written informed consent was obtained.     ANESTHESIA: Moderate sedation was required to maintain comfort, safety, and cooperation during the procedure.  The duration of sedation service was over 10 minutes.  Patient received 1mg IV Versed and 50mcg IV fentanyl.  Independent observation and monitoring was performed by Kathryn Marroquin.  The patient's level of consciousness and physiologic status was continually monitored with pulse oximetry, EKG from 0820 to 0835.  There were no complications or adverse events during sedation.  After the sedation patient was taken to the recovery area.      PROCEDURE NOTE: A pre-procedural time out was performed to confirm the correct patient, procedure, side, and site. A sterile field was prepped in standard fashion using Chlorhexidine and draped with sterile towels. An AP view of the target knee was obtained using fluoroscopy with the patella centered over the femur. A 25 gauge 3.5 inch spinal needle was advanced toward the periosteal junction of the femoral shaft and epicondyles both medially and laterally as well as the tibial shaft and epicondyle medially. Appropriate positioning was confirmed in AP and lateral views. Following negative aspiration, 2 mL of Bupivacaine 0.5% was injected at each location. The needles were re-styletted and withdrawn. The patient’s skin was cleaned with alcohol and the injection sites covered with bandages.     EBL: None     COMPLICATIONS: None     The patient was monitored until meeting established discharge criteria. Vital signs  remained stable throughout the procedure and in the recovery area. There were no immediate complications and the patient tolerated the procedure well. Sensory and motor exam was unchanged from baseline. The patient received written discharge instructions prior to discharge.     FOLLOW UP: As scheduled     ADDITIONAL NOTES:     Codes:  [id number]  [id number]

## 2024-12-06 ENCOUNTER — TELEPHONE (OUTPATIENT)
Dept: PAIN MEDICINE | Facility: CLINIC | Age: 74
End: 2024-12-06
Payer: MEDICARE

## 2024-12-06 DIAGNOSIS — M25.562 CHRONIC PAIN OF LEFT KNEE: Primary | ICD-10-CM

## 2024-12-06 DIAGNOSIS — G89.4 CHRONIC PAIN SYNDROME: ICD-10-CM

## 2024-12-06 DIAGNOSIS — G89.29 CHRONIC PAIN OF LEFT KNEE: Primary | ICD-10-CM

## 2024-12-06 NOTE — TELEPHONE ENCOUNTER
FOLLOW-UP CALL AFTER PROCEDURE    I spoke with the patient regarding how she is feeling after her procedure with Dr. Win. Patient reports she is doing well.      Sabina Harkins  underwent a left knee genicular nerve block on 12/5/2024.      Patient reported 100% pain relief that lasted for multiple hours.      Today, the patient reports pain has returned to baseline after 24 hours      Patient denies side effects or complications  Patient does not have any questions or concerns at this time    Disposition:      I provided information regarding date of next procedure, and that the surgery center will call the day before with his /her arrival time. Patient verbalized understanding.      Location of procedure:[address] [facility] [address] [place] [address] ([facility]) . Patient instructed to check in at the registration desk. Patient verbalized understanding      I advised that patient must have a , and that the  must remain in the premises until after the procedure is completed and the patient is ready to be discharged. Patient verbalized understanding.      Reminded patient of instructions to stop blood thinners when indicated    Reminded NPO status: Nothing by mouth (eat or drink) for at least 8 hours prior to the procedure. Patient can take medications with a sip of water. Patient verbalized understanding

## 2024-12-13 ENCOUNTER — TELEPHONE (OUTPATIENT)
Dept: CARDIOLOGY | Facility: CLINIC | Age: 74
End: 2024-12-13
Payer: MEDICARE

## 2024-12-13 NOTE — TELEPHONE ENCOUNTER
Pt said yesterday she got NAUSEOUS AND DIZZY Yesterday . Pt said she is having sob,dizziness , and edema . No chest pain . Today she is feeling a little better but still having the same issues . Please advise?       This is like a pandora box, multiples issues, can cause, check your sugar, check your BP, , if you are not better by Monday come over at least for EKG otherwise see your PCP, they might clinic during weekend       B/p is 120/80. Pt will go to walk in clinic tomorrow

## 2024-12-13 NOTE — TELEPHONE ENCOUNTER
Caller: Sabina Harkins    Relationship: Self    Best call back number: [phone]     What is the best time to reach you: ANYTIME    Who are you requesting to speak with (clinical staff, provider,  specific staff member): PROVIDER    Do you know the name of the person who called: NA    What was the call regarding: PT CALLED REPORTING SHE WAS NAUSEOUS AND DIZZY YESTERDAY. FEELING BETTER TODAY BUT WAS WONDERING IF SHE SHOULD GO TO ER OR COME IN FOR AN APPT JUST TO GET CHECKED OUT WHERE SHE WAS HAVING DIZZY SPELLS YESTERDAY. PLEASE ADVISE.     Is it okay if the provider responds through MyChart: NO

## 2024-12-19 ENCOUNTER — DOCUMENTATION (OUTPATIENT)
Dept: PAIN MEDICINE | Facility: CLINIC | Age: 74
End: 2024-12-19

## 2024-12-19 ENCOUNTER — OUTSIDE FACILITY SERVICE (OUTPATIENT)
Dept: PAIN MEDICINE | Facility: CLINIC | Age: 74
End: 2024-12-19
Payer: MEDICARE

## 2024-12-19 NOTE — PROGRESS NOTES
[facility]  [address] [place] [id number]      PROCEDURE: Fluoroscopically-guided LEFT-sided genicular Nerve Radiofrequency Ablation (RFA) targeting the superomedial, superolateral and inferomedial genicular nerves   PRE-OP DIAGNOSIS: LEFT knee pain  POST-OP DIAGNOSIS: Same    ANTIPLATELET/ANTICOAGULANT: SHANELLE guidelines were followed.    CONSENT: Risks, benefits and options were explained to the patient, all questions were answered and written informed consent was obtained.    ANESTHESIA: Moderate sedation was required to maintain comfort, safety, and cooperation during the procedure.  The duration of sedation service was over 10 minutes.  Patient received 2mg IV Versed and 100mcg IV fentanyl.  Independent observation and monitoring was performed by Petrona Gtz.  The patient's level of consciousness and physiologic status was continually monitored with pulse oximetry, EKG from 1103 to 1131.  There were no complications or adverse events during sedation.  After the sedation patient was taken to the recovery area.    PROCEDURE NOTE:  A pre-procedural time out was performed to confirm the correct patient, procedure, side, and site. A sterile field was prepped in standard fashion using Chlorhexidine and draped with sterile towels. An AP view of the left knee was obtained using fluoroscopy with the patella centered over the femur. The skin and subcutaneous tissue overlying the superomedial, superolateral, and inferomedial genicular nerves was anesthetized using 1% lidocaine. A 18 gauge 10 cm venom RF needle with 10 mm active tip was advanced toward the periosteal junction of the femoral shaft and epicondyles both medially and laterally as well as the tibial shaft and epicondyle medially. Appropriate positioning was confirmed in AP and lateral views. Testing was performed at each level with motor 2 Hz stimulation to ensure absence of motor nerve stimulation. Then 3 ml of 0.5%  bupivacaine was injected to anesthetize each genicular nerve. Continuous lesions were then performed at 80?C for 90 seconds at each location. Two lesions were performed at each genicular nerve location. The needles were withdrawn. The patient's skin was cleaned and the injection sites covered with bandages.    EBL: None    COMPLICATIONS: None    The patient was monitored until appropriate discharge criteria were met. Vital signs remained stable throughout the procedure and in the recovery area. There were no immediate complications and the patient tolerated the procedure well. Sensory and motor exam was unchanged from baseline. The patient received written discharge instructions prior to discharge.    FOLLOW UP: As scheduled     ADDITIONAL NOTES:       [facility]   Karel Win MD     Codes:  [id number]  [id number]

## 2024-12-31 ENCOUNTER — OFFICE VISIT (OUTPATIENT)
Dept: FAMILY MEDICINE CLINIC | Facility: CLINIC | Age: 74
End: 2024-12-31
Payer: MEDICARE

## 2024-12-31 VITALS
HEART RATE: 112 BPM | BODY MASS INDEX: 41.25 KG/M2 | WEIGHT: 262.8 LBS | TEMPERATURE: 98.1 F | OXYGEN SATURATION: 98 % | SYSTOLIC BLOOD PRESSURE: 108 MMHG | HEIGHT: 67 IN | DIASTOLIC BLOOD PRESSURE: 84 MMHG

## 2024-12-31 DIAGNOSIS — E66.01 CLASS 3 SEVERE OBESITY DUE TO EXCESS CALORIES WITH SERIOUS COMORBIDITY AND BODY MASS INDEX (BMI) OF 40.0 TO 44.9 IN ADULT: ICD-10-CM

## 2024-12-31 DIAGNOSIS — E66.813 CLASS 3 SEVERE OBESITY DUE TO EXCESS CALORIES WITH SERIOUS COMORBIDITY AND BODY MASS INDEX (BMI) OF 40.0 TO 44.9 IN ADULT: ICD-10-CM

## 2024-12-31 DIAGNOSIS — F32.A DEPRESSIVE DISORDER: ICD-10-CM

## 2024-12-31 DIAGNOSIS — I10 ESSENTIAL HYPERTENSION: ICD-10-CM

## 2024-12-31 DIAGNOSIS — G47.33 OSA (OBSTRUCTIVE SLEEP APNEA): ICD-10-CM

## 2024-12-31 DIAGNOSIS — E55.9 VITAMIN D DEFICIENCY: ICD-10-CM

## 2024-12-31 DIAGNOSIS — M54.50 CHRONIC BILATERAL LOW BACK PAIN WITHOUT SCIATICA: ICD-10-CM

## 2024-12-31 DIAGNOSIS — I50.32 CHRONIC DIASTOLIC CONGESTIVE HEART FAILURE: ICD-10-CM

## 2024-12-31 DIAGNOSIS — K21.9 GASTROESOPHAGEAL REFLUX DISEASE WITHOUT ESOPHAGITIS: ICD-10-CM

## 2024-12-31 DIAGNOSIS — J10.1 INFLUENZA B: Primary | ICD-10-CM

## 2024-12-31 DIAGNOSIS — N32.81 OVERACTIVE BLADDER: ICD-10-CM

## 2024-12-31 DIAGNOSIS — E78.2 HYPERLIPIDEMIA, MIXED: ICD-10-CM

## 2024-12-31 DIAGNOSIS — G89.29 CHRONIC BILATERAL LOW BACK PAIN WITHOUT SCIATICA: ICD-10-CM

## 2024-12-31 DIAGNOSIS — F41.1 ANXIETY STATE: Chronic | ICD-10-CM

## 2024-12-31 DIAGNOSIS — M1A.0790 IDIOPATHIC CHRONIC GOUT OF ANKLE WITHOUT TOPHUS, UNSPECIFIED LATERALITY: ICD-10-CM

## 2024-12-31 DIAGNOSIS — R68.89 FLU-LIKE SYMPTOMS: ICD-10-CM

## 2024-12-31 DIAGNOSIS — N18.31 STAGE 3A CHRONIC KIDNEY DISEASE: ICD-10-CM

## 2024-12-31 DIAGNOSIS — I25.10 CORONARY ARTERY DISEASE INVOLVING NATIVE CORONARY ARTERY OF NATIVE HEART WITHOUT ANGINA PECTORIS: ICD-10-CM

## 2024-12-31 DIAGNOSIS — R06.02 SOB (SHORTNESS OF BREATH): ICD-10-CM

## 2024-12-31 DIAGNOSIS — M15.9 GENERALIZED OSTEOARTHRITIS OF MULTIPLE SITES: ICD-10-CM

## 2024-12-31 DIAGNOSIS — R73.9 HYPERGLYCEMIA: ICD-10-CM

## 2024-12-31 LAB
EXPIRATION DATE: NORMAL
FLUAV AG UPPER RESP QL IA.RAPID: NOT DETECTED
FLUBV AG UPPER RESP QL IA.RAPID: NOT DETECTED
INTERNAL CONTROL: NORMAL
Lab: NORMAL
SARS-COV-2 AG UPPER RESP QL IA.RAPID: NOT DETECTED

## 2024-12-31 PROCEDURE — 3074F SYST BP LT 130 MM HG: CPT | Performed by: FAMILY MEDICINE

## 2024-12-31 PROCEDURE — 1160F RVW MEDS BY RX/DR IN RCRD: CPT | Performed by: FAMILY MEDICINE

## 2024-12-31 PROCEDURE — 1159F MED LIST DOCD IN RCRD: CPT | Performed by: FAMILY MEDICINE

## 2024-12-31 PROCEDURE — 87428 SARSCOV & INF VIR A&B AG IA: CPT | Performed by: FAMILY MEDICINE

## 2024-12-31 PROCEDURE — 1125F AMNT PAIN NOTED PAIN PRSNT: CPT | Performed by: FAMILY MEDICINE

## 2024-12-31 PROCEDURE — 99214 OFFICE O/P EST MOD 30 MIN: CPT | Performed by: FAMILY MEDICINE

## 2024-12-31 PROCEDURE — 3044F HG A1C LEVEL LT 7.0%: CPT | Performed by: FAMILY MEDICINE

## 2024-12-31 PROCEDURE — 3079F DIAST BP 80-89 MM HG: CPT | Performed by: FAMILY MEDICINE

## 2024-12-31 RX ORDER — MULTIVIT-MIN/IRON/FOLIC ACID/K 18-600-40
CAPSULE ORAL
Qty: 180 CAPSULE | Refills: 1 | Status: SHIPPED | OUTPATIENT
Start: 2024-12-31

## 2024-12-31 RX ORDER — PANTOPRAZOLE SODIUM 40 MG/1
40 TABLET, DELAYED RELEASE ORAL 2 TIMES DAILY
Qty: 180 TABLET | Refills: 1 | Status: SHIPPED | OUTPATIENT
Start: 2024-12-31

## 2024-12-31 RX ORDER — BUPROPION HYDROCHLORIDE 150 MG/1
150 TABLET ORAL DAILY
Qty: 90 TABLET | Refills: 1 | Status: SHIPPED | OUTPATIENT
Start: 2024-12-31

## 2024-12-31 RX ORDER — OSELTAMIVIR PHOSPHATE 75 MG/1
75 CAPSULE ORAL 2 TIMES DAILY
Qty: 10 CAPSULE | Refills: 0 | Status: SHIPPED | OUTPATIENT
Start: 2024-12-31 | End: 2025-01-05

## 2024-12-31 RX ORDER — ALLOPURINOL 300 MG/1
300 TABLET ORAL DAILY
Qty: 90 TABLET | Refills: 1 | Status: SHIPPED | OUTPATIENT
Start: 2024-12-31

## 2024-12-31 RX ORDER — EZETIMIBE 10 MG/1
10 TABLET ORAL DAILY
Qty: 90 TABLET | Refills: 1 | Status: SHIPPED | OUTPATIENT
Start: 2024-12-31

## 2024-12-31 RX ORDER — ATORVASTATIN CALCIUM 80 MG/1
80 TABLET, FILM COATED ORAL DAILY
Qty: 90 TABLET | Refills: 1 | Status: SHIPPED | OUTPATIENT
Start: 2024-12-31

## 2024-12-31 RX ORDER — ASPIRIN 81 MG/1
81 TABLET ORAL DAILY
Qty: 90 TABLET | Refills: 1 | Status: SHIPPED | OUTPATIENT
Start: 2024-12-31

## 2024-12-31 NOTE — ASSESSMENT & PLAN NOTE
Coronary artery disease is unchanged.  Continue current treatment regimen.  Cardiac status will be reassessed in 6 months.    Discussed workup for SOB and she has cardiology followup in Jan and will return for labs including BNP and get a CXR uptodate.     Encouraged another trial with ERICA treatment

## 2024-12-31 NOTE — ASSESSMENT & PLAN NOTE
Discussed workup for SOB and she has cardiology followup in Jan and will return for labs including BNP and get a CXR uptodate.     Encouraged another trial with ERICA treatment

## 2024-12-31 NOTE — ASSESSMENT & PLAN NOTE
She is using Tylenol and topical Voltaren gel as needed.      We did review her past medications and discussed ability to restart Lyrica but would need UDS, [place] reviewed, and controlled substance agreement.  She would like to hold off on restarting Lyrica at this time.    Continue to use cane for ambulatory support

## 2024-12-31 NOTE — ASSESSMENT & PLAN NOTE
Discussed weakly positive on testing.  We did discuss limitations with rapid testing and her symptoms are consistent with flu.  She is interested in treatment with Tamiflu.  We did discuss treatment expectations and if not improving I did recommend that she repeat a home COVID test.      Close follow-up with no improvement or worsening

## 2024-12-31 NOTE — ASSESSMENT & PLAN NOTE
Patient's (Body mass index is 41.16 kg/m².) indicates that they are morbidly/severely obese (BMI > 40 or > 35 with obesity - related health condition) with health conditions that include hypertension, diabetes mellitus, dyslipidemias, and GERD . Weight is unchanged. BMI  is above average; BMI management plan is completed. We discussed portion control and increasing exercise.

## 2024-12-31 NOTE — ASSESSMENT & PLAN NOTE
Awaiting recheck on renal function when she returns to get fasting lab work done after she is better from her viral infection

## 2024-12-31 NOTE — PROGRESS NOTES
Chief Complaint  Cough/chills/flu-like symptoms, SOB, HTN, CKD, Gout, OAB, Medication refills     Subjective    History of Present Illness:  Sabina Harkins is a 74 y.o. female who presents today for follow-up visit and flulike symptoms.  She is present today with her daughter.  Her symptoms started less than 48 hours ago with fatigue, sore throat, congestion, body aches, chills, and hoarseness.    She did get a flu shot about a month ago.  No known flu contacts.  No loss of smell or taste.    She has had some increased problems with shortness of breath and did have an echocardiogram completed in June with cardiology with preserved ejection fraction.  She does have a known history of sleep apnea and failed a trial with sleep apnea treatment.  We did discuss this can contribute to problems with exertional shortness of breath 3 pulmonary hypertension and she does have follow-up planned in January with cardiology.    She will return to get lab work up-to-date prior to her follow-up with cardiology and we are holding off on labs today given her current illness.  She will also get a chest x-ray up-to-date at follow-up.    No chest pain or chest pressure.    She has done well with Toviaz for overactive bladder symptoms.  She did fail Botox bladder injections, Ditropan, Ditropan XL, Detrol, and Detrol LA.      She does continue to have ongoing problems with chronic back and osteoarthritic pain.  She is off NSAIDs given concerns with chronic kidney disease and also given her current medications from cardiology.  We did try tramadol in the past but this was ineffective.  Gabapentin caused problems with hallucinations.  She was on Lyrica in the past from neurosurgery and she feels this may have helped but she wants to wait on restarting anything today and understands this is a controlled substance if she decides she wants to try Lyrica again she would need an office visit, UDS, and controlled substance agreement completed with  "me.    No gout flareups on allopurinol.    Mild depression doing well with combination of Zoloft and Wellbutrin.     GERD controlled with twice daily Protonix after she failed once daily dosing and on PPI treatment.     Ongoing cardiology follow-up for management of her coronary artery disease and hyperlipidemia.  She was unable to afford Farxiga and Repatha injections but does continue on her current regimen for chronic congestive heart failure and hyperlipidemia with hypertension.     Arthritic workup with her labs in June 2023 did return negative for autoimmune and rheumatoid arthritis.          Objective   Vital Signs:   /84 (BP Location: Left arm, Patient Position: Sitting, Cuff Size: Large Adult)   Pulse 112   Temp 98.1 °F (36.7 °C) (Oral)   Ht 170.2 cm (67\")   Wt 119 kg (262 lb 12.8 oz)   SpO2 98%   BMI 41.16 kg/m²     Review of Systems   Constitutional:  Positive for chills, fatigue and fever. Negative for diaphoresis.   HENT:  Positive for congestion, postnasal drip, rhinorrhea and sore throat. Negative for swollen glands.    Respiratory:  Positive for cough and shortness of breath. Negative for chest tightness and wheezing.    Gastrointestinal:  Negative for abdominal pain, nausea and vomiting.   Genitourinary:  Positive for frequency. Negative for dysuria.   Musculoskeletal:  Positive for arthralgias. Negative for neck pain.   Skin:  Negative for rash.   Neurological:  Negative for weakness and numbness.       Past History:  Medical History: has a past medical history of Allergic rhinitis, Arthritis of back, Bladder disorder, Body mass index (BMI) of 40.0 to 44.9 in adult, CAD (coronary artery disease), Chest pain, Chronic bilateral low back pain with sciatica, Chronic diastolic heart failure, Chronic pain disorder, Chronic systolic (congestive) heart failure, Colon polyp, Complex dyslipidemia, Coronary artery disease involving native coronary artery of native heart without angina pectoris, " Depressive disorder, Diastolic heart failure, stage B, Dyspnea, Essential hypertension, Gait abnormality, GERD without esophagitis, Gout, Heart disease, Hip arthrosis, History of colonic polyps, Hyperglycemia, Hypokalemia, Idiopathic 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 tophus, Increased frequency of urination, Joint pain, Knee pain, LAD (linear IgA dermatosis), Left ventricular diastolic dysfunction, Long term (current) use of opiate analgesic, Low back pain at multiple sites, Lumbar radiculitis, Mixed hyperlipidemia, Morbid obesity, Multiple vessel coronary artery disease, Myocardial infarction, Osteoarthritis of knee, Other chronic pain, Overactive bladder, Pure hypercholesterolemia, Rheumatoid arthritis, Seasonal allergies, SOB (shortness of breath), Spinal stenosis, Vitamin B12 deficiency, and Vitamin D deficiency.   Surgical History: has a past surgical history that includes Tubal ligation (Bilateral); Colonoscopy; Lumbar fusion; Coronary stent placement; Other surgical history; Cardiac catheterization; Cardiac catheterization (N/A, 06/04/2024); Joint replacement (Bilateral); Back surgery; Spine surgery; Knee surgery; and orthopedic surgery.   Family History: family history includes Cardiomyopathy in her brother; Colon cancer in her mother; Diabetes type II in her daughter and son; Hypertension in her daughter, sister, and son; Kidney disease in her sister; Stroke in her father and another family member.   Social History: reports that she has never smoked. She has never used smokeless tobacco. She reports current alcohol use. She reports that she does not use drugs.      Current Outpatient Medications:     allopurinol (ZYLOPRIM) 300 MG tablet, Take 1 tablet by mouth Daily. For gout prevention, Disp: 90 tablet, Rfl: 1    aspirin 81 MG EC tablet, Take 1 tablet by mouth Daily., Disp: 90 tablet, Rfl: 1    atorvastatin (LIPITOR) 80 MG tablet, Take 1 tablet by mouth Daily., Disp: 90 tablet, Rfl: 1    buPROPion XL  (Wellbutrin XL) 150 MG 24 hr tablet, Take 1 tablet by mouth Daily. For appetite and mood, Disp: 90 tablet, Rfl: 1    carvedilol (COREG) 25 MG tablet, TAKE 2 TABLETS BY MOUTH TWICE DAILY WITH MEALS, Disp: 360 tablet, Rfl: 0    Cholecalciferol (Vitamin D) 50 MCG (2000 UT) capsule, Take 2 caps po daily for vit D, Disp: 180 capsule, Rfl: 1    clopidogrel (PLAVIX) 75 MG tablet, Take 1 tablet by mouth Daily., Disp: 90 tablet, Rfl: 1    ezetimibe (ZETIA) 10 MG tablet, Take 1 tablet by mouth Daily., Disp: 90 tablet, Rfl: 1    fesoterodine fumarate (TOVIAZ ER) 8 MG tablet sustained-release 24 hour tablet, Take 1 tablet by mouth Daily., Disp: , Rfl:     losartan (COZAAR) 50 MG tablet, Take 1 tablet by mouth Daily. for blood pressure, Disp: 90 tablet, Rfl: 3    nitroglycerin (NITROSTAT) 0.4 MG SL tablet, Place 1 tablet under the tongue Every 5 (Five) Minutes As Needed for Chest Pain. Take no more than 3 doses in 15 minutes., Disp: 30 tablet, Rfl: 3    pantoprazole (PROTONIX) 40 MG EC tablet, Take 1 tablet by mouth 2 (Two) Times a Day. For acid reflux, Disp: 180 tablet, Rfl: 1    polyethylene glycol (MIRALAX) 17 GM/SCOOP powder, Take 17 g by mouth Daily., Disp: , Rfl:     promethazine (PHENERGAN) 25 MG tablet, TAKE 1/2 TO 1 (ONE-HALF TO ONE) TABLET BY MOUTH EVERY 4 TO 6 HOURS AS NEEDED FOR NAUSEA CAUTION SEDATION, Disp: , Rfl:     sertraline (ZOLOFT) 50 MG tablet, Take 1 tablet by mouth Daily. For mood, Disp: 90 tablet, Rfl: 1    spironolactone (ALDACTONE) 25 MG tablet, Take 1 tablet by mouth Daily., Disp: 90 tablet, Rfl: 3    torsemide (DEMADEX) 20 MG tablet, Take 1 tablet by mouth Daily., Disp: 90 tablet, Rfl: 1    oseltamivir (Tamiflu) 75 MG capsule, Take 1 capsule by mouth 2 (Two) Times a Day for 5 days., Disp: 10 capsule, Rfl: 0    Allergies: Codeine, Nabumetone, and Gabapentin    Physical Exam  Constitutional:       Appearance: Normal appearance.   HENT:      Head: Normocephalic.      Right Ear: External ear normal.       Left Ear: External ear normal.      Nose: Nose normal. Congestion and rhinorrhea present.   Eyes:      Extraocular Movements: Extraocular movements intact.      Conjunctiva/sclera: Conjunctivae normal.      Pupils: Pupils are equal, round, and reactive to light.   Cardiovascular:      Rate and Rhythm: Regular rhythm. Tachycardia present.      Heart sounds: Normal heart sounds. No murmur heard.     No friction rub. No gallop.   Pulmonary:      Effort: Pulmonary effort is normal. No respiratory distress.      Breath sounds: Normal breath sounds. No stridor. No wheezing or rhonchi.   Musculoskeletal:      Cervical back: Normal range of motion.      Comments: Unchanged OA findings, no tophi or inflammatory joint changes.    Skin:     General: Skin is warm and dry.   Neurological:      Mental Status: She is alert and oriented to person, place, and time. Mental status is at baseline.   Psychiatric:         Mood and Affect: Mood normal.         Behavior: Behavior normal.         Thought Content: Thought content normal.          Result Review                   Assessment and Plan  Diagnoses and all orders for this visit:    1. Influenza B (Primary)  Assessment & Plan:  Discussed weakly positive on testing.  We did discuss limitations with rapid testing and her symptoms are consistent with flu.  She is interested in treatment with Tamiflu.  We did discuss treatment expectations and if not improving I did recommend that she repeat a home COVID test.      Close follow-up with no improvement or worsening    Orders:  -     oseltamivir (Tamiflu) 75 MG capsule; Take 1 capsule by mouth 2 (Two) Times a Day for 5 days.  Dispense: 10 capsule; Refill: 0    2. Flu-like symptoms  -     POCT SARS-CoV-2 Antigen YURY + Flu    3. Essential hypertension  Assessment & Plan:  Hypertension is improving with treatment.  Continue current treatment regimen.  Blood pressure will be reassessed at the next regular appointment.    Orders:  -      Comprehensive Metabolic Panel; Future  -     Lipid Panel; Future  -     TSH; Future  -     T4, Free; Future  -     CBC & Differential; Future    4. Hyperglycemia  Assessment & Plan:  Awaiting recheck on A1c with labs    Orders:  -     Hemoglobin A1c; Future    5. Coronary artery disease involving native coronary artery of native heart without angina pectoris  Assessment & Plan:  Coronary artery disease is unchanged.  Continue current treatment regimen.  Cardiac status will be reassessed in 6 months.    Discussed workup for SOB and she has cardiology followup in Jan and will return for labs including BNP and get a CXR uptodate.     Encouraged another trial with ERICA treatment     Orders:  -     Comprehensive Metabolic Panel; Future  -     Lipid Panel; Future  -     TSH; Future  -     T4, Free; Future  -     CBC & Differential; Future  -     aspirin 81 MG EC tablet; Take 1 tablet by mouth Daily.  Dispense: 90 tablet; Refill: 1  -     atorvastatin (LIPITOR) 80 MG tablet; Take 1 tablet by mouth Daily.  Dispense: 90 tablet; Refill: 1  -     ezetimibe (ZETIA) 10 MG tablet; Take 1 tablet by mouth Daily.  Dispense: 90 tablet; Refill: 1    6. Chronic diastolic congestive heart failure  -     Comprehensive Metabolic Panel; Future  -     Lipid Panel; Future  -     TSH; Future  -     T4, Free; Future  -     CBC & Differential; Future  -     atorvastatin (LIPITOR) 80 MG tablet; Take 1 tablet by mouth Daily.  Dispense: 90 tablet; Refill: 1  -     ezetimibe (ZETIA) 10 MG tablet; Take 1 tablet by mouth Daily.  Dispense: 90 tablet; Refill: 1    7. Idiopathic chronic gout of ankle without tophus, unspecified laterality  Assessment & Plan:  Continue losartan and allopurinol.  We will check uric acid with labs    Orders:  -     Comprehensive Metabolic Panel; Future  -     Uric acid; Future  -     CBC & Differential; Future  -     allopurinol (ZYLOPRIM) 300 MG tablet; Take 1 tablet by mouth Daily. For gout prevention  Dispense: 90  tablet; Refill: 1    8. Chronic bilateral low back pain without sciatica  Assessment & Plan:  She is using Tylenol and topical Voltaren gel as needed.      We did review her past medications and discussed ability to restart Lyrica but would need UDS, Brown reviewed, and controlled substance agreement.  She would like to hold off on restarting Lyrica at this time.    Continue to use cane for ambulatory support      9. Depressive disorder  Assessment & Plan:  Patient's depression is recurrent and is mild without psychosis. Their depression is currently in partial remission and the condition is improving with treatment. This will be reassessed at the next regular appointment. F/U as described:patient will continue current medication therapy.    Orders:  -     buPROPion XL (Wellbutrin XL) 150 MG 24 hr tablet; Take 1 tablet by mouth Daily. For appetite and mood  Dispense: 90 tablet; Refill: 1  -     sertraline (ZOLOFT) 50 MG tablet; Take 1 tablet by mouth Daily. For mood  Dispense: 90 tablet; Refill: 1    10. Gastroesophageal reflux disease without esophagitis  Assessment & Plan:  Controlled with Protonix    Orders:  -     pantoprazole (PROTONIX) 40 MG EC tablet; Take 1 tablet by mouth 2 (Two) Times a Day. For acid reflux  Dispense: 180 tablet; Refill: 1    11. Overactive bladder  Assessment & Plan:  Continues treatment with Toviaz    She does continue to follow-up with Dr. Chou as well      12. ERICA (obstructive sleep apnea)  Assessment & Plan:  Encouraged CPAP use      13. Generalized osteoarthritis of multiple sites  Assessment & Plan:  Negative workup for rheumatoid arthritis and inflammatory arthritis last year    Continue with Tylenol and use of cane for ambulatory support      14. Vitamin D deficiency  Assessment & Plan:  Awaiting recheck on vitamin D    Orders:  -     Vitamin D,25-Hydroxy; Future  -     Cholecalciferol (Vitamin D) 50 MCG (2000 UT) capsule; Take 2 caps po daily for vit D  Dispense: 180  capsule; Refill: 1    15. Stage 3a chronic kidney disease  Assessment & Plan:  Awaiting recheck on renal function when she returns to get fasting lab work done after she is better from her viral infection     Orders:  -     Comprehensive Metabolic Panel; Future  -     Lipid Panel; Future  -     CBC & Differential; Future    16. Hyperlipidemia, mixed  Assessment & Plan:   Lipid abnormalities are improving with treatment    Plan:  Continue same medication/s without change.      Discussed medication dosage, use, side effects, and goals of treatment in detail.    Counseled patient on lifestyle modifications to help control hyperlipidemia.     Patient Treatment Goals:   LDL goal is under 100    Followup at the next regular appointment.    Orders:  -     Comprehensive Metabolic Panel; Future  -     Lipid Panel; Future  -     TSH; Future  -     T4, Free; Future  -     CBC & Differential; Future    17. SOB (shortness of breath)  Assessment & Plan:  Discussed workup for SOB and she has cardiology followup in Jan and will return for labs including BNP and get a CXR uptodate.     Encouraged another trial with ERICA treatment     Orders:  -     proBNP; Future  -     XR Chest 2 View; Future  -     CBC & Differential; Future    18. Anxiety state  Assessment & Plan:  Doing well with Zoloft      19. Class 3 severe obesity due to excess calories with serious comorbidity and body mass index (BMI) of 40.0 to 44.9 in adult  Assessment & Plan:  Patient's (Body mass index is 41.16 kg/m².) indicates that they are morbidly/severely obese (BMI > 40 or > 35 with obesity - related health condition) with health conditions that include hypertension, diabetes mellitus, dyslipidemias, and GERD . Weight is unchanged. BMI  is above average; BMI management plan is completed. We discussed portion control and increasing exercise.                      Follow Up  Return in about 6 months (around 6/30/2025) for Medicare Wellness.    Juan Ramon Valenzuela,  MD

## 2025-01-08 ENCOUNTER — TELEPHONE (OUTPATIENT)
Dept: FAMILY MEDICINE CLINIC | Facility: CLINIC | Age: 75
End: 2025-01-08

## 2025-01-08 RX ORDER — BENZONATATE 200 MG/1
200 CAPSULE ORAL 3 TIMES DAILY PRN
Qty: 30 CAPSULE | Refills: 3 | Status: SHIPPED | OUTPATIENT
Start: 2025-01-08

## 2025-01-08 NOTE — TELEPHONE ENCOUNTER
Caller: Sabina Harkins    Relationship: Self    Best call back number: [phone]     What medication are you requesting: BENZONATATE TESSALON PERLE    What are your current symptoms: PT HAS THE SAME SYMPTOM AS SHE HAD ON 12-31-24 APPT.     How long have you been experiencing symptoms: 2 WEEKS    Have you had these symptoms before:    [x] Yes  [] No    Have you been treated for these symptoms before:   [x] Yes  [] No    If a prescription is needed, what is your preferred pharmacy and phone number: [facility] [address] [place] [address] [phone] [facility] [phone] FX     Additional notes:PT STATED SHE HAS NOT GOTTEN ANY BETTER SINCE HER APPT ON 12-31-24. PT'S FRIEND TOLD HER THAT SHE WAS PRESCRIBED BENZONATATE FOR HER COUGH AND IT REALLY HELPED. PT ASKED IF SHE CAN HAVE THIS CALL IN FOR HERSELF OR IS THERE SOMETHING ELSE DR CAN PRESCRIBE AND RECOMMEND.

## 2025-01-17 ENCOUNTER — LAB (OUTPATIENT)
Dept: FAMILY MEDICINE CLINIC | Facility: CLINIC | Age: 75
End: 2025-01-17
Payer: MEDICARE

## 2025-01-17 DIAGNOSIS — I50.32 CHRONIC DIASTOLIC CONGESTIVE HEART FAILURE: ICD-10-CM

## 2025-01-17 DIAGNOSIS — R06.02 SOB (SHORTNESS OF BREATH): ICD-10-CM

## 2025-01-17 DIAGNOSIS — R73.9 HYPERGLYCEMIA: ICD-10-CM

## 2025-01-17 DIAGNOSIS — I10 ESSENTIAL HYPERTENSION: ICD-10-CM

## 2025-01-17 DIAGNOSIS — N18.31 STAGE 3A CHRONIC KIDNEY DISEASE: ICD-10-CM

## 2025-01-17 DIAGNOSIS — E55.9 VITAMIN D DEFICIENCY: ICD-10-CM

## 2025-01-17 DIAGNOSIS — M1A.0790 IDIOPATHIC CHRONIC GOUT OF ANKLE WITHOUT TOPHUS, UNSPECIFIED LATERALITY: ICD-10-CM

## 2025-01-17 DIAGNOSIS — I25.10 CORONARY ARTERY DISEASE INVOLVING NATIVE CORONARY ARTERY OF NATIVE HEART WITHOUT ANGINA PECTORIS: ICD-10-CM

## 2025-01-17 DIAGNOSIS — E78.2 HYPERLIPIDEMIA, MIXED: ICD-10-CM

## 2025-01-18 LAB
25(OH)D3+25(OH)D2 SERPL-MCNC: 61.3 NG/ML (ref 30–100)
ALBUMIN SERPL-MCNC: 4.2 G/DL (ref 3.8–4.8)
ALP SERPL-CCNC: 161 IU/L (ref 44–121)
ALT SERPL-CCNC: 24 IU/L (ref 0–32)
AST SERPL-CCNC: 25 IU/L (ref 0–40)
BASOPHILS # BLD AUTO: 0.1 X10E3/UL (ref 0–0.2)
BASOPHILS NFR BLD AUTO: 1 %
BILIRUB SERPL-MCNC: 0.8 MG/DL (ref 0–1.2)
BUN SERPL-MCNC: 24 MG/DL (ref 8–27)
BUN/CREAT SERPL: 13 (ref 12–28)
CALCIUM SERPL-MCNC: 9.6 MG/DL (ref 8.7–10.3)
CHLORIDE SERPL-SCNC: 106 MMOL/L (ref 96–106)
CHOLEST SERPL-MCNC: 115 MG/DL (ref 100–199)
CO2 SERPL-SCNC: 20 MMOL/L (ref 20–29)
CREAT SERPL-MCNC: 1.79 MG/DL (ref 0.57–1)
EGFRCR SERPLBLD CKD-EPI 2021: 30 ML/MIN/1.73
EOSINOPHIL # BLD AUTO: 0.1 X10E3/UL (ref 0–0.4)
EOSINOPHIL NFR BLD AUTO: 1 %
ERYTHROCYTE [DISTWIDTH] IN BLOOD BY AUTOMATED COUNT: 13.2 % (ref 11.7–15.4)
GLOBULIN SER CALC-MCNC: 2.7 G/DL (ref 1.5–4.5)
GLUCOSE SERPL-MCNC: 92 MG/DL (ref 70–99)
HBA1C MFR BLD: 5.7 % (ref 4.8–5.6)
HCT VFR BLD AUTO: 48.4 % (ref 34–46.6)
HDLC SERPL-MCNC: 53 MG/DL
HGB BLD-MCNC: 15.6 G/DL (ref 11.1–15.9)
IMM GRANULOCYTES # BLD AUTO: 0 X10E3/UL (ref 0–0.1)
IMM GRANULOCYTES NFR BLD AUTO: 0 %
LDLC SERPL CALC-MCNC: 46 MG/DL (ref 0–99)
LYMPHOCYTES # BLD AUTO: 3.4 X10E3/UL (ref 0.7–3.1)
LYMPHOCYTES NFR BLD AUTO: 31 %
MCH RBC QN AUTO: 29.8 PG (ref 26.6–33)
MCHC RBC AUTO-ENTMCNC: 32.2 G/DL (ref 31.5–35.7)
MCV RBC AUTO: 92 FL (ref 79–97)
MONOCYTES # BLD AUTO: 1 X10E3/UL (ref 0.1–0.9)
MONOCYTES NFR BLD AUTO: 9 %
NEUTROPHILS # BLD AUTO: 6.5 X10E3/UL (ref 1.4–7)
NEUTROPHILS NFR BLD AUTO: 58 %
PLATELET # BLD AUTO: 290 X10E3/UL (ref 150–450)
POTASSIUM SERPL-SCNC: 4.2 MMOL/L (ref 3.5–5.2)
PROT SERPL-MCNC: 6.9 G/DL (ref 6–8.5)
RBC # BLD AUTO: 5.24 X10E6/UL (ref 3.77–5.28)
SODIUM SERPL-SCNC: 144 MMOL/L (ref 134–144)
T4 FREE SERPL-MCNC: 1.4 NG/DL (ref 0.82–1.77)
TRIGL SERPL-MCNC: 78 MG/DL (ref 0–149)
TSH SERPL DL<=0.005 MIU/L-ACNC: 0.99 UIU/ML (ref 0.45–4.5)
URATE SERPL-MCNC: 4.1 MG/DL (ref 3.1–7.9)
VLDLC SERPL CALC-MCNC: 16 MG/DL (ref 5–40)
WBC # BLD AUTO: 11 X10E3/UL (ref 3.4–10.8)

## 2025-01-24 ENCOUNTER — OFFICE VISIT (OUTPATIENT)
Dept: CARDIOLOGY | Facility: CLINIC | Age: 75
End: 2025-01-24
Payer: MEDICARE

## 2025-01-24 VITALS
RESPIRATION RATE: 18 BRPM | HEIGHT: 67 IN | DIASTOLIC BLOOD PRESSURE: 62 MMHG | TEMPERATURE: 97 F | BODY MASS INDEX: 41.12 KG/M2 | OXYGEN SATURATION: 99 % | SYSTOLIC BLOOD PRESSURE: 86 MMHG | WEIGHT: 262 LBS | HEART RATE: 84 BPM

## 2025-01-24 DIAGNOSIS — I10 ESSENTIAL HYPERTENSION: Chronic | ICD-10-CM

## 2025-01-24 DIAGNOSIS — I25.10 CORONARY ARTERY DISEASE INVOLVING NATIVE CORONARY ARTERY OF NATIVE HEART WITHOUT ANGINA PECTORIS: Chronic | ICD-10-CM

## 2025-01-24 DIAGNOSIS — I50.32 CHRONIC DIASTOLIC CONGESTIVE HEART FAILURE: Primary | Chronic | ICD-10-CM

## 2025-01-24 DIAGNOSIS — E78.2 HYPERLIPIDEMIA, MIXED: ICD-10-CM

## 2025-01-24 DIAGNOSIS — G47.33 OSA (OBSTRUCTIVE SLEEP APNEA): Chronic | ICD-10-CM

## 2025-01-24 NOTE — PROGRESS NOTES
[facility] [place]  [facility]  [address] [place] [address]  Dept: [phone]  Dept Fax: [phone]    Sabina Harkins  1950    Follow Up Office Visit Note    History of Present Illness:  Sabina Harkins is a 74 y.o. female who presents to the clinic for Follow-up.  SOB and diastolic heart failure- She feels SOB walking short distances, occasional some edema no today,  she is on Torsemide 20 mg and also Aldactone 25 mg,  her Creatinine few weeks ago was up to 1,7 BP today is 95.60, will hold all the diuretics, and Losartan, will get a BMP, advised tod rinks extra fluids, will hold the above meds,  she did have a cath last year with patent stents and no significant disease and LVEDP was 15, her insurance has not approve the Farxiga and the  Repatha but has change insurance and she ask me to RX    The following portions of the patient's history were reviewed and updated as appropriate: allergies, current medications, past family history, past medical history, past social history, past surgical history, and problem list.    Medications:  allopurinol  aspirin  atorvastatin  benzonatate  buPROPion XL  carvedilol  clopidogrel  ezetimibe  fesoterodine fumarate tablet sustained-release 24 hour  losartan  nitroglycerin  pantoprazole  polyethylene glycol  promethazine  sertraline  spironolactone  torsemide  Vitamin D capsule    Subjective  Allergies   Allergen Reactions   • Codeine Unknown - High Severity     Pt had surgery and doctor believes she might be allergic to it. Patient is not sure what it does.   • Nabumetone Unknown - High Severity   • Gabapentin Hallucinations        Past Medical History:   Diagnosis Date   • Allergic rhinitis    • Arthritis of back    • Bladder disorder    • Body mass index (BMI) of 40.0 to 44.9 in adult    • CAD (coronary artery disease)    • Chest pain    • Chronic bilateral low back pain with sciatica    • Chronic diastolic heart failure    • Chronic  pain disorder    • Chronic systolic (congestive) heart failure    • Colon polyp    • Complex dyslipidemia    • Coronary artery disease involving native coronary artery of native heart without angina pectoris    • Depressive disorder    • Diastolic heart failure, stage B    • Dyspnea    • Essential hypertension    • Gait abnormality    • GERD without esophagitis    • Gout    • Heart disease    • Hip arthrosis    • History of colonic polyps    • Hyperglycemia    • Hypokalemia    • Idiopathic 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 tophus    • Increased frequency of urination    • Joint pain    • Knee pain    • LAD (linear IgA dermatosis)    • Left ventricular diastolic dysfunction    • Long term (current) use of opiate analgesic    • Low back pain at multiple sites    • Lumbar radiculitis    • Mixed hyperlipidemia    • Morbid obesity    • Multiple vessel coronary artery disease    • Myocardial infarction    • Osteoarthritis of knee    • Other chronic pain    • Overactive bladder    • Pure hypercholesterolemia    • Rheumatoid arthritis    • Seasonal allergies    • SOB (shortness of breath)    • Spinal stenosis    • Vitamin B12 deficiency    • Vitamin D deficiency        Past Surgical History:   Procedure Laterality Date   • BACK SURGERY     • CARDIAC CATHETERIZATION     • CARDIAC CATHETERIZATION N/A 06/04/2024    Procedure: Left Heart Cath;  Surgeon: Joe Wagner MD;  Location: [facility] INVASIVE LOC[facility];  Service: Cardiovascular;  Laterality: N/A;   • COLONOSCOPY     • CORONARY STENT PLACEMENT     • JOINT REPLACEMENT Bilateral     left  >20 years   Right 10 years ago   • KNEE SURGERY     • LUMBAR FUSION     • ORTHOPEDIC SURGERY     • OTHER SURGICAL HISTORY      RESECTION OF SWEAT GLAND AND ACELOUS SKIN   • SPINE SURGERY     • TUBAL ABDOMINAL LIGATION Bilateral        Family History   Problem Relation Age of Onset   • Colon cancer Mother    • Stroke Father    • Hypertension Sister    • Kidney disease Sister    •  "Cardiomyopathy Brother    • Stroke Other    • Diabetes type II Daughter    • Hypertension Daughter    • Diabetes type II Son    • Hypertension Son         Social History     Socioeconomic History   • Marital status:    Tobacco Use   • Smoking status: Never   • Smokeless tobacco: Never   Vaping Use   • Vaping status: Never Used   Substance and Sexual Activity   • Alcohol use: Yes     Comment: SOCIALLY- monthly   • Drug use: Never   • Sexual activity: Defer       Review of Systems   Constitutional: Negative.    HENT: Negative.     Respiratory: Negative.     Cardiovascular: Negative.    Endocrine: Negative.    Genitourinary: Negative.    Musculoskeletal: Negative.    Skin: Negative.    Allergic/Immunologic: Negative.    Neurological: Negative.    Hematological: Negative.    Psychiatric/Behavioral: Negative.       Cardiovascular Procedures    ECHO/MUGA:  STRESS TESTS:   CARDIAC CATH:   DEVICES:   HOLTER:   CT/MRI:   VASCULAR:   CARDIOTHORACIC:     Objective  Vitals:    01/24/25 1124 01/24/25 1215   BP: 110/60 (!) 86/62   BP Location: Right arm    Patient Position: Lying    Cuff Size: Adult    Pulse: 84    Resp: 18    Temp: 97 °F (36.1 °C)    TempSrc: Infrared    SpO2: 99%    Weight: 119 kg (262 lb)    Height: 170.2 cm (67\")    PainSc: 0-No pain      Body mass index is 41.04 kg/m².     Physical Exam  Vitals reviewed.   Constitutional:       Appearance: Healthy appearance. Not in distress.   Neck:      Vascular: No JVR. JVD normal.   Pulmonary:      Effort: Pulmonary effort is normal.      Breath sounds: Normal breath sounds. No wheezing. No rhonchi. No rales.   Chest:      Chest wall: Not tender to palpatation.   Cardiovascular:      PMI at left midclavicular line. Normal rate. Regular rhythm. Normal S1. Normal S2.       Murmurs: There is no murmur.      No gallop.  No click. No rub.   Pulses:     Intact distal pulses.   Edema:     Peripheral edema absent.   Abdominal:      General: Bowel sounds are normal.      " Palpations: Abdomen is soft.      Tenderness: There is no abdominal tenderness.   Musculoskeletal: Normal range of motion.         General: No tenderness. Skin:     General: Skin is warm and dry.   Neurological:      General: No focal deficit present.      Mental Status: Alert and oriented to person, place and time.        Diagnostic Data    ECG 12 Lead    Date/Time: 1/24/2025 12:25 PM  Performed by: Mehul Stephens MD    Authorized by: Mehul Stephens MD  Comparison: compared with previous ECG from 9/24/2024  Similar to previous ECG  Rhythm: sinus rhythm  Rate: normal  BPM: 96  QRS axis: normal  Other findings: low voltage    Clinical impression: normal ECG        Assessment and Plan  Diagnoses and all orders for this visit:    Chronic diastolic congestive heart failure- Will hold diuretics and also losartan, BP is low 90.60 and Creatinine has been high few weeks ago- she complaints of SOB on moderate activities,  walking, not better despite diuretics  -     Basic Metabolic Panel    Coronary artery disease involving native coronary artery of native heart without angina pectoris- No chest pain, prior stent to LAD and recent cath patent stent  on ASA and Plavix    Essential hypertension- BP is low 90.60 will hold Losartan and also diuretics    Hyperlipidemia, mixed- On Lipitor 80 mg and Zetia    ERICA (obstructive sleep apnea)- _ No wearing CPAP         Return in about 3 months (around 4/24/2025) for Recheck with Dr. Stephens.    Mehul Stephens MD  01/24/2025

## 2025-01-24 NOTE — PROGRESS NOTES
Venipuncture Blood Specimen Collection  Venipuncture performed in clinic by Mariana Saunders RN with good hemostasis. Patient tolerated the procedure well without complications.   01/24/25   Mariana Saunders RN

## 2025-01-25 LAB
BUN SERPL-MCNC: 21 MG/DL (ref 8–27)
BUN/CREAT SERPL: 16 (ref 12–28)
CALCIUM SERPL-MCNC: 9.4 MG/DL (ref 8.7–10.3)
CHLORIDE SERPL-SCNC: 102 MMOL/L (ref 96–106)
CO2 SERPL-SCNC: 18 MMOL/L (ref 20–29)
CREAT SERPL-MCNC: 1.32 MG/DL (ref 0.57–1)
EGFRCR SERPLBLD CKD-EPI 2021: 43 ML/MIN/1.73
GLUCOSE SERPL-MCNC: 89 MG/DL (ref 70–99)
POTASSIUM SERPL-SCNC: 5 MMOL/L (ref 3.5–5.2)
SODIUM SERPL-SCNC: 139 MMOL/L (ref 134–144)

## 2025-01-25 RX ORDER — DAPAGLIFLOZIN 10 MG/1
10 TABLET, FILM COATED ORAL DAILY
Qty: 90 TABLET | Refills: 2 | Status: SHIPPED | OUTPATIENT
Start: 2025-01-25

## 2025-01-27 ENCOUNTER — TELEPHONE (OUTPATIENT)
Dept: FAMILY MEDICINE CLINIC | Facility: CLINIC | Age: 75
End: 2025-01-27
Payer: MEDICARE

## 2025-01-27 NOTE — TELEPHONE ENCOUNTER
She is on the spironolactone to help raise her potassium now.  The potassium supplement has not been on her active med list here

## 2025-01-27 NOTE — TELEPHONE ENCOUNTER
Caller: Sabina Harkins    Relationship: Self    Best call back number: [phone]    What medication are you requesting: POTACHLORIDE ER 20MEQ    If a prescription is needed, what is your preferred pharmacy and phone number:  [facility] [place]    Additional notes: PT NEEDS TO KNOW IF SHE SHOULD STILL BE TAKING THIS OR NOT. SHE JUST HAS NOTICED THAT IT IS NOT ON HER MED LIST AND NEEDS TO MAKE SURE SHE NEEDS TO TAKE IT    PLEASE CALL

## 2025-01-28 ENCOUNTER — TELEPHONE (OUTPATIENT)
Dept: CARDIOLOGY | Facility: CLINIC | Age: 75
End: 2025-01-28
Payer: MEDICARE

## 2025-01-28 RX ORDER — DAPAGLIFLOZIN 10 MG/1
10 TABLET, FILM COATED ORAL DAILY
COMMUNITY
End: 2025-01-28 | Stop reason: SDUPTHER

## 2025-01-28 RX ORDER — DAPAGLIFLOZIN 10 MG/1
10 TABLET, FILM COATED ORAL DAILY
Qty: 90 TABLET | Refills: 1 | Status: SHIPPED | OUTPATIENT
Start: 2025-01-28

## 2025-01-28 RX ORDER — SPIRONOLACTONE 25 MG/1
25 TABLET ORAL DAILY
Qty: 90 TABLET | Refills: 3 | Status: SHIPPED | OUTPATIENT
Start: 2025-01-28

## 2025-01-28 NOTE — TELEPHONE ENCOUNTER
Spoke with Ms Torin and went over her lab results:  Creatinine is better 1.3. she confirms she did d/c Losartan.  Dr. Stephens wants you to restart your Torsemide and also Aldactone today and in 1 week he wants you to start the Farxiga.   In 2-3 weeks Dr. Stephens wants us to check another BMP and I will call you to remind you to come in for that lab. She verbalized understanding and repeated back to me.

## 2025-01-28 NOTE — TELEPHONE ENCOUNTER
[employer]  Outcome  [facility]  CaseId:[id number];Status:Approved;Review Type:Prior Auth;Coverage Start Date:01/01/2025;Coverage End Date:01/28/2026;  Authorization Expiration Date: 1/27/2026

## 2025-01-28 NOTE — TELEPHONE ENCOUNTER
----- Message from Mehul Stephens sent at 1/25/2025  8:30 AM EST -----  Creatinine is better 1,3 , will d,c Losartan, will hold the Torsemide and also Aldactone for 2 more days, then restart those in addition in 1 week we can start the Farxiga 10 mg, she will need to come for a BMP in 2-3 weeks after starting Farxiga, we have told her not to take the Losartan at all

## 2025-02-13 ENCOUNTER — TELEPHONE (OUTPATIENT)
Dept: CARDIOLOGY | Facility: CLINIC | Age: 75
End: 2025-02-13
Payer: MEDICARE

## 2025-02-13 NOTE — TELEPHONE ENCOUNTER
----- Message from Mariana STEWART sent at 1/28/2025  2:51 PM EST -----  Started Farxiga 2 weeks ago and now needs a BMP.

## 2025-02-13 NOTE — TELEPHONE ENCOUNTER
Left Ms Harkins a message:  [facility]: Started Farxiga 2 weeks ago and now needs a BMP.     Can come 8-11 or 1-4 and do not need to fast.

## 2025-02-18 ENCOUNTER — CLINICAL SUPPORT (OUTPATIENT)
Dept: CARDIOLOGY | Facility: CLINIC | Age: 75
End: 2025-02-18
Payer: MEDICARE

## 2025-02-18 DIAGNOSIS — Z79.899 NEW MEDICATION ADDED: Primary | ICD-10-CM

## 2025-02-19 ENCOUNTER — TELEPHONE (OUTPATIENT)
Dept: CARDIOLOGY | Facility: CLINIC | Age: 75
End: 2025-02-19
Payer: MEDICARE

## 2025-02-19 LAB
BUN SERPL-MCNC: 17 MG/DL (ref 8–27)
BUN/CREAT SERPL: 12 (ref 12–28)
CALCIUM SERPL-MCNC: 9.3 MG/DL (ref 8.7–10.3)
CHLORIDE SERPL-SCNC: 109 MMOL/L (ref 96–106)
CO2 SERPL-SCNC: 21 MMOL/L (ref 20–29)
CREAT SERPL-MCNC: 1.39 MG/DL (ref 0.57–1)
EGFRCR SERPLBLD CKD-EPI 2021: 40 ML/MIN/1.73
GLUCOSE SERPL-MCNC: 90 MG/DL (ref 70–99)
POTASSIUM SERPL-SCNC: 4.4 MMOL/L (ref 3.5–5.2)
SODIUM SERPL-SCNC: 146 MMOL/L (ref 134–144)

## 2025-02-19 NOTE — TELEPHONE ENCOUNTER
----- Message from Mehul Stephens sent at 2/19/2025 12:10 PM EST -----  Creatinine and K are good, keep same meds. Pt understands message

## 2025-02-24 DIAGNOSIS — I25.10 CORONARY ARTERY DISEASE INVOLVING NATIVE CORONARY ARTERY OF NATIVE HEART WITHOUT ANGINA PECTORIS: Chronic | ICD-10-CM

## 2025-02-24 RX ORDER — CLOPIDOGREL BISULFATE 75 MG/1
75 TABLET ORAL DAILY
Qty: 90 TABLET | Refills: 1 | Status: SHIPPED | OUTPATIENT
Start: 2025-02-24

## 2025-02-25 NOTE — PROGRESS NOTES
Venipuncture Blood Specimen Collection  Venipuncture performed in clinic by Yady Phillip MA with good hemostasis. Patient tolerated the procedure well without complications.   02/25/25   Yady Phillip MA

## 2025-03-06 ENCOUNTER — OFFICE VISIT (OUTPATIENT)
Dept: PAIN MEDICINE | Facility: CLINIC | Age: 75
End: 2025-03-06
Payer: MEDICARE

## 2025-03-06 VITALS — BODY MASS INDEX: 41.44 KG/M2 | HEIGHT: 67 IN | WEIGHT: 264 LBS

## 2025-03-06 DIAGNOSIS — M79.18 MYOFASCIAL PAIN: ICD-10-CM

## 2025-03-06 DIAGNOSIS — M25.562 CHRONIC PAIN OF LEFT KNEE: ICD-10-CM

## 2025-03-06 DIAGNOSIS — M70.62 GREATER TROCHANTERIC BURSITIS OF LEFT HIP: Primary | ICD-10-CM

## 2025-03-06 DIAGNOSIS — G89.4 CHRONIC PAIN SYNDROME: ICD-10-CM

## 2025-03-06 DIAGNOSIS — G89.29 CHRONIC PAIN OF LEFT KNEE: ICD-10-CM

## 2025-03-06 RX ORDER — METHOCARBAMOL 500 MG/1
500 TABLET, FILM COATED ORAL 2 TIMES DAILY PRN
Qty: 60 TABLET | Refills: 1 | Status: SHIPPED | OUTPATIENT
Start: 2025-03-06

## 2025-03-06 NOTE — PROGRESS NOTES
Referring Physician: No referring provider defined for this encounter.    Primary Physician: Juan Ramon Valenzuela MD    CHIEF COMPLAINT or REASON FOR VISIT: Knee Pain      Initial history of present illness on 08/16/2024:  Ms. Sabina Harkins is 74 y.o. female who presents as a new patient referral for treatment of chronic bilateral knee pain.  She has previously undergone bilateral TKA 20 years ago of her left knee and 10 years ago for her right knee.  She had excellent relief from these procedures however she has noticed over the last several years increasing pain especially in her left knee.  She is very sensitive to light touch in the left knee does not wear pants very often due to this issue.  She has been evaluated by orthopedics, Dr. Cartagena, who referred for consideration of nonsurgical management.  She is additionally undergone ongoing evaluation with Dr. Quintero for potential revision however her BMI is 40.88.  She is interested in potential therapies.    Interval history:  Patient returns to clinic today after undergoing left knee genicular nerve blocks and ablation.  Unfortunately, she reports minimal relief from this procedure.  She is not complaining  of knee pain but left lateral thigh and left thigh pain today.  This is diffusely tender to palpation.  She is not having any pain below her left knee.  She denies any new injuries or events since her previous appointment.  She is unable to take NSAIDs secondary to kidney dysfunction.  She does take Tylenol without any significant relief.  She does not think she received much relief from her left genicular nerve ablation.  She has undergone multiple therapies of his knee including peripheral nerve stimulator.  Unfortunately, upon removal of one of her peripheral nerve stimulator she did suffer a lead fracture with a retained fragment.  She has been previously given the appropriate documentation regarding MRI  compatibility.        Interventions:  9/10/2024: Left femoral and saphenous nerve PNS 80% relief of medial knee pain  12/5/2024: Left knee genicular nerve block with 100% relief  12/19/2024: Left knee genicular nerve RFA with    Objective Pain Scoring:   BRIEF PAIN INVENTORY:  Total score:   Pain Score    03/06/25 1504   PainSc: 10-Worst pain ever      PHQ-2:    PHQ-9:    Opioid Risk Tool:         Review of Systems:   ROS negative except as otherwise noted     Past Medical History:   Past Medical History:   Diagnosis Date    Allergic rhinitis     Arthritis of back     Bladder disorder     Body mass index (BMI) of 40.0 to 44.9 in adult     CAD (coronary artery disease)     Chest pain     Chronic bilateral low back pain with sciatica     Chronic diastolic heart failure     Chronic pain disorder     Chronic systolic (congestive) heart failure     Colon polyp     Complex dyslipidemia     Coronary artery disease involving native coronary artery of native heart without angina pectoris     Depressive disorder     Diastolic heart failure, stage B     Dyspnea     Essential hypertension     Gait abnormality     GERD without esophagitis     Gout     Heart disease     Hip arthrosis     History of colonic polyps     Hyperglycemia     Hypokalemia     Idiopathic 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 tophus     Increased frequency of urination     Joint pain     Knee pain     LAD (linear IgA dermatosis)     Left ventricular diastolic dysfunction     Long term (current) use of opiate analgesic     Low back pain at multiple sites     Lumbar radiculitis     Mixed hyperlipidemia     Morbid obesity     Multiple vessel coronary artery disease     Myocardial infarction     Osteoarthritis of knee     Other chronic pain     Overactive bladder     Pure hypercholesterolemia     Rheumatoid arthritis     Seasonal allergies     SOB (shortness of breath)     Spinal stenosis     Vitamin B12 deficiency     Vitamin D deficiency          Past  Surgical History:   Past Surgical History:   Procedure Laterality Date    BACK SURGERY      CARDIAC CATHETERIZATION      CARDIAC CATHETERIZATION N/A 06/04/2024    Procedure: Left Heart Cath;  Surgeon: Joe Wagner MD;  Location: [facility] INVASIVE LOC[facility];  Service: Cardiovascular;  Laterality: N/A;    COLONOSCOPY      CORONARY STENT PLACEMENT      JOINT REPLACEMENT Bilateral     left  >20 years   Right 10 years ago    KNEE SURGERY      LUMBAR FUSION      ORTHOPEDIC SURGERY      OTHER SURGICAL HISTORY      RESECTION OF SWEAT GLAND AND ACELOUS SKIN    SPINE SURGERY      TUBAL ABDOMINAL LIGATION Bilateral          Family History   Family History   Problem Relation Age of Onset    Colon cancer Mother     Stroke Father     Hypertension Sister     Kidney disease Sister     Cardiomyopathy Brother     Stroke Other     Diabetes type II Daughter     Hypertension Daughter     Diabetes type II Son     Hypertension Son          Social History   Social History     Socioeconomic History    Marital status:    Tobacco Use    Smoking status: Never    Smokeless tobacco: Never   Vaping Use    Vaping status: Never Used   Substance and Sexual Activity    Alcohol use: Yes     Comment: SOCIALLY- monthly    Drug use: Never    Sexual activity: Defer        Medications:     Current Outpatient Medications:     allopurinol (ZYLOPRIM) 300 MG tablet, Take 1 tablet by mouth Daily. For gout prevention, Disp: 90 tablet, Rfl: 1    aspirin 81 MG EC tablet, Take 1 tablet by mouth Daily., Disp: 90 tablet, Rfl: 1    atorvastatin (LIPITOR) 80 MG tablet, Take 1 tablet by mouth Daily., Disp: 90 tablet, Rfl: 1    benzonatate (TESSALON) 200 MG capsule, Take 1 capsule by mouth 3 (Three) Times a Day As Needed for Cough., Disp: 30 capsule, Rfl: 3    buPROPion XL (Wellbutrin XL) 150 MG 24 hr tablet, Take 1 tablet by mouth Daily. For appetite and mood, Disp: 90 tablet, Rfl: 1    carvedilol (COREG) 25 MG tablet, TAKE 2 TABLETS BY MOUTH TWICE DAILY  "WITH MEALS, Disp: 360 tablet, Rfl: 0    Cholecalciferol (Vitamin D) 50 MCG (2000 UT) capsule, Take 2 caps po daily for vit D, Disp: 180 capsule, Rfl: 1    clopidogrel (PLAVIX) 75 MG tablet, Take 1 tablet by mouth Daily., Disp: 90 tablet, Rfl: 1    Evolocumab (REPATHA) solution auto-injector SureClick injection, Inject 1 mL under the skin into the appropriate area as directed Every 14 (Fourteen) Days., Disp: 6 mL, Rfl: 3    ezetimibe (ZETIA) 10 MG tablet, Take 1 tablet by mouth Daily., Disp: 90 tablet, Rfl: 1    Farxiga 10 MG tablet, Take 10 mg by mouth Daily., Disp: 90 tablet, Rfl: 1    fesoterodine fumarate (TOVIAZ ER) 8 MG tablet sustained-release 24 hour tablet, Take 1 tablet by mouth Daily., Disp: , Rfl:     nitroglycerin (NITROSTAT) 0.4 MG SL tablet, Place 1 tablet under the tongue Every 5 (Five) Minutes As Needed for Chest Pain. Take no more than 3 doses in 15 minutes., Disp: 30 tablet, Rfl: 3    pantoprazole (PROTONIX) 40 MG EC tablet, Take 1 tablet by mouth 2 (Two) Times a Day. For acid reflux, Disp: 180 tablet, Rfl: 1    polyethylene glycol (MIRALAX) 17 GM/SCOOP powder, Take 17 g by mouth Daily., Disp: , Rfl:     promethazine (PHENERGAN) 25 MG tablet, TAKE 1/2 TO 1 (ONE-HALF TO ONE) TABLET BY MOUTH EVERY 4 TO 6 HOURS AS NEEDED FOR NAUSEA CAUTION SEDATION, Disp: , Rfl:     sertraline (ZOLOFT) 50 MG tablet, Take 1 tablet by mouth Daily. For mood, Disp: 90 tablet, Rfl: 1    spironolactone (ALDACTONE) 25 MG tablet, Take 1 tablet by mouth Daily., Disp: 90 tablet, Rfl: 3    torsemide (DEMADEX) 20 MG tablet, Take 1 tablet by mouth Daily., Disp: 90 tablet, Rfl: 1    methocarbamol (ROBAXIN) 500 MG tablet, Take 1 tablet by mouth 2 (Two) Times a Day As Needed for Muscle Spasms., Disp: 60 tablet, Rfl: 1        Physical Exam:     Vitals:    03/06/25 1504   Weight: 120 kg (264 lb)   Height: 170.2 cm (67\")   PainSc: 10-Worst pain ever        General: Alert and oriented, No acute distress.   HEENT: Normocephalic, " atraumatic.   Cardiovascular: No gross edema, obese  Respiratory: Respirations are non-labored    Bilateral knee TKA incisions  Allodynia with light touch in the anterior and medial aspect of left knee    Left thigh diffusely tender to palpation  Left GTB tender to palpation    Sensory Exam: Hypersensitive to light touch in left knee    Neurologic: Cranial Nerves II-XII are grossly intact.   Psychiatric: Cooperative.   Gait: Normal   Assistive Devices: None        Imaging Studies:   No results found for this or any previous visit.        Independent review of radiographic imaging:     Impression & Plan:       08/16/2024: Sabina Harkins is a 74 y.o. female with past medical history significant for CAD, congestive heart failure, HTN, ERICA, GERD, depression, anxiety, hypokalemia, hyperglycemia, DKA who presents to the pain clinic for evaluation and treatment of chronic left greater than right bilateral knee pain.  Evaluation consistent with chronic left knee pain, chronic and right knee pain.  There are certainly neuropathic qualities to her left knee pain with significant allodynia with light touch.  Able to ascertain skin changes due to dark skin tone.  We discussed potential therapies including genicular nerve ablation, peripheral nerve stimulation.  She would like to proceed with Sprint PNS.  9/24/2024: 2 weeks s/p Sprint PNS for left knee pain.  Doing well thus far.  No new complaints.  Will follow-up for lead pull  11/11/2024: Good relief of medial knee pain from peripheral nerve stimulator with Sprint PNS.  Did experience one lead fracture.  Patient was educated appropriately and provided documentation regarding lead fracture.  Will plan for left genicular nerve blocks for ongoing lateral knee pain.  3/6/2025: Minimal relief from left knee genicular nerve RFA.  Symptoms today consistent with left greater trochanter bursitis, myofascial pain of left thigh.  Will start methocarbamol.  Will plan for left GTB.    1.  Greater trochanteric bursitis of left hip    2. Myofascial pain    3. Chronic pain of left knee    4. Chronic pain syndrome              PLAN:  1. Medication Recommendations: Recommend Voltaren topical, NSAIDs, Tylenol.  Can trial turmeric 500 mg twice daily if NSAID contraindicated.  -Start methocarbamol 500 mg 1 to 2 tablets daily as needed; she was informed of the side effects of this medication including dizziness, sedation, drowsiness.  She understands if she experiences any side effects that she should discontinue the medication.    2. Physical Therapy: Continue HEP    3. Psychological: defer    4. Complementary and alternative (CAM) Therapies:     5. Labs/Diagnostic studies: None indicated     6. Imaging: Patient did experience lead fracture upon removal peripheral nerve stimulator on 11/11/2024..  She has been given multiple copies of this documentation.    Below is the recommendation for MRIs.  For 1.5T MRI scanner: Scan for up to 7 minutes for landmarks above the knee and 12-minute deneen for below the knee.  Wait a minimum of 4 minutes before the next imaging session.    First 3T MR scanner: Scan up for 5 minutes for landmarks above the knee and 11 for landmarks below the knee.  Wait a minimum of 4 minutes before the next imaging session.    7. Interventions: Schedule left greater trochanter bursa injection.  We discussed a steroid injection to improve pain.  If greater than 50% relief for at least 2 to 3 months can consider repeat as needed every 3 to 4 months.  Had a discussion with the patient regarding the risk of the procedure including bleeding, infection, damage to surrounding structures.  We discussed the potential adverse effects of corticosteroid injection including flushing of the face, lipodystrophy, skin discoloration, elevated blood glucose, increased blood pressure.  Risks of frequent steroid administration includes weight gain, hormonal changes, mood changes, osteoporosis.     8. Referrals:  None indicated     9. Records:     10. Lifestyle goals:    Follow-up 3 months      [facility]  Adriana Augustin PA-C          Quality Metrics:

## 2025-03-17 DIAGNOSIS — I50.32 CHRONIC DIASTOLIC CONGESTIVE HEART FAILURE: Chronic | ICD-10-CM

## 2025-03-17 DIAGNOSIS — I10 ESSENTIAL HYPERTENSION: Chronic | ICD-10-CM

## 2025-03-17 DIAGNOSIS — I25.10 CORONARY ARTERY DISEASE INVOLVING NATIVE CORONARY ARTERY OF NATIVE HEART WITHOUT ANGINA PECTORIS: Chronic | ICD-10-CM

## 2025-03-17 RX ORDER — CARVEDILOL 25 MG/1
50 TABLET ORAL 2 TIMES DAILY WITH MEALS
Qty: 360 TABLET | Refills: 0 | Status: SHIPPED | OUTPATIENT
Start: 2025-03-17

## 2025-03-18 ENCOUNTER — OFFICE VISIT (OUTPATIENT)
Dept: FAMILY MEDICINE CLINIC | Facility: CLINIC | Age: 75
End: 2025-03-18
Payer: MEDICARE

## 2025-03-18 ENCOUNTER — TELEPHONE (OUTPATIENT)
Dept: PAIN MEDICINE | Facility: CLINIC | Age: 75
End: 2025-03-18
Payer: MEDICARE

## 2025-03-18 VITALS
DIASTOLIC BLOOD PRESSURE: 86 MMHG | SYSTOLIC BLOOD PRESSURE: 112 MMHG | OXYGEN SATURATION: 95 % | BODY MASS INDEX: 40.79 KG/M2 | HEIGHT: 67 IN | HEART RATE: 94 BPM | WEIGHT: 259.9 LBS

## 2025-03-18 DIAGNOSIS — R25.2 MUSCLE CRAMPS: Primary | ICD-10-CM

## 2025-03-18 DIAGNOSIS — N30.00 ACUTE CYSTITIS WITHOUT HEMATURIA: ICD-10-CM

## 2025-03-18 PROCEDURE — 1125F AMNT PAIN NOTED PAIN PRSNT: CPT | Performed by: PHYSICIAN ASSISTANT

## 2025-03-18 PROCEDURE — 3079F DIAST BP 80-89 MM HG: CPT | Performed by: PHYSICIAN ASSISTANT

## 2025-03-18 PROCEDURE — 99213 OFFICE O/P EST LOW 20 MIN: CPT | Performed by: PHYSICIAN ASSISTANT

## 2025-03-18 PROCEDURE — 3044F HG A1C LEVEL LT 7.0%: CPT | Performed by: PHYSICIAN ASSISTANT

## 2025-03-18 PROCEDURE — 3074F SYST BP LT 130 MM HG: CPT | Performed by: PHYSICIAN ASSISTANT

## 2025-03-18 NOTE — PROGRESS NOTES
Office Note     Name: Sabina Harkins    : 1950     MRN: [id number]     Chief Complaint  Leg Pain (Muscle cramps x 1-2 weeks )    Subjective   Patient or patient representative verbalized consent for the use of Ambient Listening during the visit with  Demetria Lanza PA-C for chart documentation. 3/30/2025  15:47 EDT      Sabina Harkins is a 74 y.o. female.     The patient presents for evaluation of leg cramps.    She reports severe leg cramps in the lower extremities, occasionally involving the thigh, occurring without warning, particularly at night, for 1 to 2 weeks. Frequency has increased, causing significant discomfort. Previously prescribed magnesium was discontinued when levels normalized. She has not tried OTC magnesium supplements. Consuming Pedialyte every other day for the past week and increasing water intake. Muscle relaxants ineffective. Scheduled for cortisone injection on 2025. Underwent 3 stent placements with temporary relief. Allergic to GABAPENTIN, which caused hallucinations at higher doses; lower doses ineffective. Tried Lyrica in the past.    Currently taking Macrobid for UTI.    Review of Systems:   Review of Systems   Constitutional:  Positive for fatigue. Negative for chills and fever.   HENT:  Negative for congestion and swollen glands.    Respiratory:  Negative for cough.    Cardiovascular:  Negative for chest pain.   Gastrointestinal:  Negative for nausea and vomiting.   Genitourinary:  Positive for dysuria.   Musculoskeletal:  Positive for myalgias. Negative for neck pain.   Skin:  Negative for rash.   Neurological:  Positive for numbness. Negative for weakness.   All other systems reviewed and are negative.      Past Medical History:   Past Medical History:   Diagnosis Date    Allergic rhinitis     Arthritis of back     Bladder disorder     Body mass index (BMI) of 40.0 to 44.9 in adult     CAD (coronary artery disease)     Chest pain     Chronic bilateral low back  pain with sciatica     Chronic diastolic heart failure     Chronic pain disorder     Chronic systolic (congestive) heart failure     Colon polyp     Complex dyslipidemia     Coronary artery disease involving native coronary artery of native heart without angina pectoris     Depressive disorder     Diastolic heart failure, stage B     Dyspnea     Essential hypertension     Gait abnormality     GERD without esophagitis     Gout     Heart disease     Hip arthrosis     History of colonic polyps     Hyperglycemia     Hypokalemia     Idiopathic 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 tophus     Increased frequency of urination     Joint pain     Knee pain     LAD (linear IgA dermatosis)     Left ventricular diastolic dysfunction     Long term (current) use of opiate analgesic     Low back pain at multiple sites     Lumbar radiculitis     Mixed hyperlipidemia     Morbid obesity     Multiple vessel coronary artery disease     Myocardial infarction     Osteoarthritis of knee     Other chronic pain     Overactive bladder     Pure hypercholesterolemia     Rheumatoid arthritis     Seasonal allergies     SOB (shortness of breath)     Spinal stenosis     Urinary tract infection     Vitamin B12 deficiency     Vitamin D deficiency        Past Surgical History:   Past Surgical History:   Procedure Laterality Date    BACK SURGERY      CARDIAC CATHETERIZATION      CARDIAC CATHETERIZATION N/A 06/04/2024    Procedure: Left Heart Cath;  Surgeon: Joe Wagner MD;  Location: [facility] CATH INVASIVE LOC[facility];  Service: Cardiovascular;  Laterality: N/A;    COLONOSCOPY      CORONARY STENT PLACEMENT      JOINT REPLACEMENT Bilateral     left  >20 years   Right 10 years ago    KNEE SURGERY      LUMBAR FUSION      ORTHOPEDIC SURGERY      OTHER SURGICAL HISTORY      RESECTION OF SWEAT GLAND AND ACELOUS SKIN    SPINE SURGERY      TUBAL ABDOMINAL LIGATION Bilateral        Family History:   Family History   Problem Relation Age of Onset    Colon  cancer Mother     Stroke Father     Hypertension Sister     Kidney disease Sister     Cardiomyopathy Brother     Stroke Other     Diabetes type II Daughter     Hypertension Daughter     Diabetes type II Son     Hypertension Son        Social History:   Social History     Socioeconomic History    Marital status:    Tobacco Use    Smoking status: Never    Smokeless tobacco: Never   Vaping Use    Vaping status: Never Used   Substance and Sexual Activity    Alcohol use: Yes     Comment: SOCIALLY- monthly    Drug use: Never    Sexual activity: Defer       Immunizations:   Immunization History   Administered Date(s) Administered    Arexvy (RSV, Adults 60+ yrs) 11/01/2023    Fluzone High-Dose 65+YRS 12/11/2024    Fluzone High-Dose 65+yrs 11/01/2023    Pneumococcal Conjugate 20-Valent (PCV20) 03/03/2023    Shingrix 12/04/2023, 04/26/2024        Medications:     Current Outpatient Medications:     allopurinol (ZYLOPRIM) 300 MG tablet, Take 1 tablet by mouth Daily. For gout prevention, Disp: 90 tablet, Rfl: 1    aspirin 81 MG EC tablet, Take 1 tablet by mouth Daily., Disp: 90 tablet, Rfl: 1    atorvastatin (LIPITOR) 80 MG tablet, Take 1 tablet by mouth Daily., Disp: 90 tablet, Rfl: 1    benzonatate (TESSALON) 200 MG capsule, Take 1 capsule by mouth 3 (Three) Times a Day As Needed for Cough., Disp: 30 capsule, Rfl: 3    buPROPion XL (Wellbutrin XL) 150 MG 24 hr tablet, Take 1 tablet by mouth Daily. For appetite and mood, Disp: 90 tablet, Rfl: 1    carvedilol (COREG) 25 MG tablet, TAKE 2 TABLETS BY MOUTH TWICE DAILY WITH MEALS, Disp: 360 tablet, Rfl: 0    Cholecalciferol (Vitamin D) 50 MCG (2000 UT) capsule, Take 2 caps po daily for vit D, Disp: 180 capsule, Rfl: 1    clopidogrel (PLAVIX) 75 MG tablet, Take 1 tablet by mouth Daily., Disp: 90 tablet, Rfl: 1    Evolocumab (REPATHA) solution auto-injector SureClick injection, Inject 1 mL under the skin into the appropriate area as directed Every 14 (Fourteen) Days., Disp:  "6 mL, Rfl: 3    ezetimibe (ZETIA) 10 MG tablet, Take 1 tablet by mouth Daily., Disp: 90 tablet, Rfl: 1    Farxiga 10 MG tablet, Take 10 mg by mouth Daily., Disp: 90 tablet, Rfl: 1    fesoterodine fumarate (TOVIAZ ER) 8 MG tablet sustained-release 24 hour tablet, Take 1 tablet by mouth Daily., Disp: , Rfl:     methocarbamol (ROBAXIN) 500 MG tablet, Take 1 tablet by mouth 2 (Two) Times a Day As Needed for Muscle Spasms., Disp: 60 tablet, Rfl: 1    nitroglycerin (NITROSTAT) 0.4 MG SL tablet, Place 1 tablet under the tongue Every 5 (Five) Minutes As Needed for Chest Pain. Take no more than 3 doses in 15 minutes., Disp: 30 tablet, Rfl: 3    pantoprazole (PROTONIX) 40 MG EC tablet, Take 1 tablet by mouth 2 (Two) Times a Day. For acid reflux, Disp: 180 tablet, Rfl: 1    polyethylene glycol (MIRALAX) 17 GM/SCOOP powder, Take 17 g by mouth Daily., Disp: , Rfl:     promethazine (PHENERGAN) 25 MG tablet, TAKE 1/2 TO 1 (ONE-HALF TO ONE) TABLET BY MOUTH EVERY 4 TO 6 HOURS AS NEEDED FOR NAUSEA CAUTION SEDATION, Disp: , Rfl:     sertraline (ZOLOFT) 50 MG tablet, Take 1 tablet by mouth Daily. For mood, Disp: 90 tablet, Rfl: 1    spironolactone (ALDACTONE) 25 MG tablet, Take 1 tablet by mouth Daily., Disp: 90 tablet, Rfl: 3    torsemide (DEMADEX) 20 MG tablet, Take 1 tablet by mouth Daily., Disp: 90 tablet, Rfl: 1    Allergies:   Allergies   Allergen Reactions    Codeine Unknown - High Severity     Pt had surgery and doctor believes she might be allergic to it. Patient is not sure what it does.    Nabumetone Unknown - High Severity    Gabapentin Hallucinations       Objective     Vital Signs  /86   Pulse 94   Ht 170.2 cm (67\")   Wt 118 kg (259 lb 14.4 oz)   SpO2 95%   BMI 40.71 kg/m²   Estimated body mass index is 40.71 kg/m² as calculated from the following:    Height as of this encounter: 170.2 cm (67\").    Weight as of this encounter: 118 kg (259 lb 14.4 oz).         Physical Exam  Vitals and nursing note reviewed. "   Constitutional:       General: She is not in acute distress.     Appearance: Normal appearance. She is not ill-appearing.   HENT:      Head: Normocephalic and atraumatic.   Cardiovascular:      Rate and Rhythm: Normal rate and regular rhythm.      Pulses: Normal pulses.      Heart sounds: Normal heart sounds.   Pulmonary:      Effort: Pulmonary effort is normal. No respiratory distress.      Breath sounds: Normal breath sounds.   Musculoskeletal:      Right lower leg: No edema.      Left lower leg: No edema.   Skin:     General: Skin is warm and dry.   Neurological:      General: No focal deficit present.      Mental Status: She is alert and oriented to person, place, and time.   Psychiatric:         Mood and Affect: Mood normal.         Behavior: Behavior normal.              Results:  No results found for this or any previous visit (from the past 24 hours).   Results      Assessment and Plan       Diagnoses and all orders for this visit:    1. Muscle cramps (Primary)  Assessment & Plan:  Likely related to known back issues but possibly due to magnesium deficiency, potentially exacerbated by Farxiga. Try OTC magnesium supplements for leg cramps.  Offered to draw labs to check levels, but she declines blood work at this time.  Adequate hydration recommended, avoid electrolyte-rich beverages like Pedialyte unless extremely dehydrated due to potentially over replacing electrolytes. Follow up with Dr. Valenzuela and inform pain management if no improvement.      2. Acute cystitis without hematuria  Assessment & Plan:  Taking Macrobid for bacterial infection. Complete course of antibiotics as prescribed.          Follow Up  Return for scheduled f/u or sooner if sxs worsen or persist.    Demetria Lanza PA-C  [facility]

## 2025-03-18 NOTE — TELEPHONE ENCOUNTER
Caller: MS IBARRA    Relationship: PATIENT    Best call back number: [phone] (home)       What is the best time to reach you: ANYTIME    Who are you requesting to speak with (clinical staff, provider,  specific staff member): CLINICAL    Do you know the name of the person who called: PATIENT     What was the call regarding: PATIENT HAS CONCERN WITH MEDICAITON SHE WAS PRESCRIBED. SHE SAID IT DOESNT SEEM TO BE WORKING. METHOCARBAMOL/ROBAXIN 500MG       Is it okay if the provider responds through MyChart: MY CHART WOULD BE FINE

## 2025-03-24 NOTE — TELEPHONE ENCOUNTER
"Surgery/Procedure:  left greater trochanter bursa injection   Surgery/Procedure Date:  03/25/2025  Last visit:   Office Visit with Adriana Augustin PA-C (03/06/2025)   Next visit: 06/27/2025     Reason for call:     Copied from HUB:    \"What was the call regarding: Patient has concern with medication she was prescribed. she said it doesn't seem to be working. Methocarbamol/robaxin 500 mg\"    Patient is scheduled for a bursa injection tomorrow.   "

## 2025-03-25 ENCOUNTER — OUTSIDE FACILITY SERVICE (OUTPATIENT)
Dept: PAIN MEDICINE | Facility: CLINIC | Age: 75
End: 2025-03-25
Payer: MEDICARE

## 2025-03-25 ENCOUNTER — DOCUMENTATION (OUTPATIENT)
Dept: PAIN MEDICINE | Facility: CLINIC | Age: 75
End: 2025-03-25

## 2025-03-25 PROCEDURE — 20610 DRAIN/INJ JOINT/BURSA W/O US: CPT | Performed by: STUDENT IN AN ORGANIZED HEALTH CARE EDUCATION/TRAINING PROGRAM

## 2025-03-25 PROCEDURE — 77002 NEEDLE LOCALIZATION BY XRAY: CPT | Performed by: STUDENT IN AN ORGANIZED HEALTH CARE EDUCATION/TRAINING PROGRAM

## 2025-03-25 NOTE — PROGRESS NOTES
[facility]  [address] [place] [address]    PROCEDURE: Fluoroscopy guided LEFT Greater trochanteric bursa injection     PRE-OP DIAGNOSIS: Greater trochanteric bursitis    POST-OP DIAGNOSIS: Greater trochanteric bursitis    ANTIPLATELET/ANTICOAGULANT STOP DATE: n/a   ANTIPLATELET/ANTICOAGULANT RESTART DATE: n/a     CONSENT: Risks, benefits and options were explained to the patient, all questions were answered and written informed consent was obtained.     ANESTHESIA: Local only     PROCEDURE NOTE: After timeout was performed, the patient was placed in the supine position with all pressure points padded and the selected side upward. All proceduralists donned sterile gloves with masks and surgical hats. The area of maximal tenderness was identified and marked. The skin was sterilely prepped with chlorhexidine.  Tender greater trochanter was identified under AP fluoroscopy.  Then a 25 gauge 3.5 inch spinal needle was inserted in the lateral thigh and midline and advanced medially under intermittent fluoroscopy until the target greater trochanteric bone was contacted. The needle was slightly withdrawn 1-2 mm and an injectate containing methylprednisolone 40 mg steroid and 4 ml of bupivacaine 0.5% local anesthetic was administered. The needle was then flushed and removed. Pressure was held and a dressing placed.     EBL: None   COMPLICATIONS: None     The patient tolerated the procedure well. Vital signs were stable. Sensory and motor exam was unchanged from baseline. The patient was observed in recovery and was discharged after meeting established criteria.    FOLLOW UP: As scheduled     ADDITIONAL NOTES:     Codes:    Codes:   23461  75380

## 2025-03-30 PROBLEM — R25.2 MUSCLE CRAMPS: Status: ACTIVE | Noted: 2025-03-30

## 2025-03-31 NOTE — ASSESSMENT & PLAN NOTE
Likely related to known back issues but possibly due to magnesium deficiency, potentially exacerbated by Farxiga. Try OTC magnesium supplements for leg cramps.  Offered to draw labs to check levels, but she declines blood work at this time.  Adequate hydration recommended, avoid electrolyte-rich beverages like Pedialyte unless extremely dehydrated due to potentially over replacing electrolytes. Follow up with Dr. Valenzuela and inform pain management if no improvement.

## 2025-04-23 ENCOUNTER — TELEPHONE (OUTPATIENT)
Dept: PAIN MEDICINE | Facility: CLINIC | Age: 75
End: 2025-04-23
Payer: MEDICARE

## 2025-04-23 NOTE — TELEPHONE ENCOUNTER
Caller: Sabina Harkins    Relationship: Self    Best call back number: [phone]    What is the best time to reach you: ANYTIME    Who are you requesting to speak with (clinical staff, provider,  specific staff member): CLINICAL STAFF    What was the call regarding:  CT SCAN TOMORROW AND THE PATIENT HAS CLINICAL QUESTIONS REGARDING IMPLANT    Is it okay if the provider responds through [employer]:  MESSAGE IN [employer] OR CALL PLEASE

## 2025-04-28 ENCOUNTER — OFFICE VISIT (OUTPATIENT)
Dept: CARDIOLOGY | Facility: CLINIC | Age: 75
End: 2025-04-28
Payer: MEDICARE

## 2025-04-28 VITALS
WEIGHT: 256 LBS | SYSTOLIC BLOOD PRESSURE: 144 MMHG | DIASTOLIC BLOOD PRESSURE: 97 MMHG | TEMPERATURE: 98 F | HEIGHT: 67 IN | OXYGEN SATURATION: 96 % | BODY MASS INDEX: 40.18 KG/M2 | RESPIRATION RATE: 18 BRPM | HEART RATE: 56 BPM

## 2025-04-28 DIAGNOSIS — I10 ESSENTIAL HYPERTENSION: Chronic | ICD-10-CM

## 2025-04-28 DIAGNOSIS — G47.33 OSA (OBSTRUCTIVE SLEEP APNEA): Chronic | ICD-10-CM

## 2025-04-28 DIAGNOSIS — I50.32 CHRONIC DIASTOLIC CONGESTIVE HEART FAILURE: Primary | Chronic | ICD-10-CM

## 2025-04-28 DIAGNOSIS — E78.2 HYPERLIPIDEMIA, MIXED: ICD-10-CM

## 2025-04-28 DIAGNOSIS — I25.10 CORONARY ARTERY DISEASE INVOLVING NATIVE CORONARY ARTERY OF NATIVE HEART WITHOUT ANGINA PECTORIS: Chronic | ICD-10-CM

## 2025-04-28 DIAGNOSIS — R06.02 SOB (SHORTNESS OF BREATH): ICD-10-CM

## 2025-04-28 PROCEDURE — 1160F RVW MEDS BY RX/DR IN RCRD: CPT | Performed by: INTERNAL MEDICINE

## 2025-04-28 PROCEDURE — 99214 OFFICE O/P EST MOD 30 MIN: CPT | Performed by: INTERNAL MEDICINE

## 2025-04-28 PROCEDURE — 3078F DIAST BP <80 MM HG: CPT | Performed by: INTERNAL MEDICINE

## 2025-04-28 PROCEDURE — 1159F MED LIST DOCD IN RCRD: CPT | Performed by: INTERNAL MEDICINE

## 2025-04-28 PROCEDURE — 93000 ELECTROCARDIOGRAM COMPLETE: CPT | Performed by: INTERNAL MEDICINE

## 2025-04-28 PROCEDURE — 3074F SYST BP LT 130 MM HG: CPT | Performed by: INTERNAL MEDICINE

## 2025-04-28 RX ORDER — NITROGLYCERIN 0.4 MG/1
0.4 TABLET SUBLINGUAL
Qty: 30 TABLET | Refills: 3 | Status: SHIPPED | OUTPATIENT
Start: 2025-04-28

## 2025-04-28 NOTE — PROGRESS NOTES
[facility]  [facility] CARDIOLOGY  [address] [place] [address]  Dept: [phone]  Dept Fax: [phone]    Sabina Harkins  1950    Follow Up Office Visit Note    History of Present Illness:  Sabina Harkins is a 74 y.o. female who presents to the clinic for CAD- She states had has chest pain at resting for 5 to 10 minutes twice  a month, her last cath was near  normal, she has prior stent to LAD> on ASA and Plavix, EKG sinus HR 96    The following portions of the patient's history were reviewed and updated as appropriate: allergies, current medications, past family history, past medical history, past social history, past surgical history, and problem list.    Medications:  allopurinol  aspirin  atorvastatin  benzonatate  buPROPion XL  carvedilol  clopidogrel  Evolocumab solution auto-injector  ezetimibe  Farxiga tablet  fesoterodine fumarate tablet sustained-release 24 hour  methocarbamol  nitroglycerin  pantoprazole  polyethylene glycol  promethazine  sertraline  spironolactone  torsemide  Vitamin D capsule    Subjective  Allergies   Allergen Reactions   • Codeine Unknown - High Severity     Pt had surgery and doctor believes she might be allergic to it. Patient is not sure what it does.   • Nabumetone Unknown - High Severity   • Gabapentin Hallucinations        Past Medical History:   Diagnosis Date   • Allergic rhinitis    • Arthritis of back    • Bladder disorder    • Body mass index (BMI) of 40.0 to 44.9 in adult    • CAD (coronary artery disease)    • Chest pain    • Chronic bilateral low back pain with sciatica    • Chronic diastolic heart failure    • Chronic pain disorder    • Chronic systolic (congestive) heart failure    • Colon polyp    • Complex dyslipidemia    • Coronary artery disease involving native coronary artery of native heart without angina pectoris    • Depressive disorder    • Diastolic heart failure, stage B    • Dyspnea    • Essential hypertension     • Gait abnormality    • GERD without esophagitis    • Gout    • Heart disease    • Hip arthrosis    • History of colonic polyps    • Hyperglycemia    • Hypokalemia    • Idiopathic 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 tophus    • Increased frequency of urination    • Joint pain    • Knee pain    • LAD (linear IgA dermatosis)    • Left ventricular diastolic dysfunction    • Long term (current) use of opiate analgesic    • Low back pain at multiple sites    • Lumbar radiculitis    • Mixed hyperlipidemia    • Morbid obesity    • Multiple vessel coronary artery disease    • Myocardial infarction    • Osteoarthritis of knee    • Other chronic pain    • Overactive bladder    • Pure hypercholesterolemia    • Rheumatoid arthritis    • Seasonal allergies    • SOB (shortness of breath)    • Spinal stenosis    • Urinary tract infection    • Vitamin B12 deficiency    • Vitamin D deficiency        Past Surgical History:   Procedure Laterality Date   • BACK SURGERY     • CARDIAC CATHETERIZATION     • CARDIAC CATHETERIZATION N/A 06/04/2024    Procedure: Left Heart Cath;  Surgeon: Joe Wagner MD;  Location: [facility] INVASIVE LOCATION;  Service: Cardiovascular;  Laterality: N/A;   • COLONOSCOPY     • CORONARY STENT PLACEMENT     • JOINT REPLACEMENT Bilateral     left  >20 years   Right 10 years ago   • KNEE SURGERY     • LUMBAR FUSION     • ORTHOPEDIC SURGERY     • OTHER SURGICAL HISTORY      RESECTION OF SWEAT GLAND AND ACELOUS SKIN   • SPINE SURGERY     • TUBAL ABDOMINAL LIGATION Bilateral        Family History   Problem Relation Age of Onset   • Colon cancer Mother    • Stroke Father    • Hypertension Sister    • Kidney disease Sister    • Cardiomyopathy Brother    • Stroke Other    • Diabetes type II Daughter    • Hypertension Daughter    • Diabetes type II Son    • Hypertension Son         Social History     Socioeconomic History   • Marital status:    Tobacco Use   • Smoking status: Never   • Smokeless  "tobacco: Never   Vaping Use   • Vaping status: Never Used   Substance and Sexual Activity   • Alcohol use: Yes     Comment: SOCIALLY- monthly   • Drug use: Never   • Sexual activity: Defer       Review of Systems   Respiratory:  Positive for shortness of breath.    All other systems reviewed and are negative.      Cardiovascular Procedures    ECHO/MUGA:  STRESS TESTS:   CARDIAC CATH:   DEVICES:   HOLTER:   CT/MRI:   VASCULAR:   CARDIOTHORACIC:     Objective  Vitals:    04/28/25 1414 04/28/25 1415 04/28/25 1416 04/28/25 1417   BP: 103/77 139/88 94/67 144/97   BP Location: Right arm Right arm Right arm Right arm   Patient Position: Lying  Comment: dizzy Standing  Comment: 1 min dizzy Sitting  Comment: 3 min could not stand pain in knee and dizzy Standing  Comment: 5 min stood back up for time and pain in knee very dizzy   Cuff Size: Adult Adult Adult Adult   Pulse: 80 66 (!) 47 56   Resp: 18      Temp: 98 °F (36.7 °C)      TempSrc: Infrared      SpO2: 96%      Weight: 116 kg (256 lb)      Height: 170.2 cm (67\")      PainSc: 10-Worst pain ever 10-Worst pain ever 10-Worst pain ever    PainLoc: Knee  Knee      Body mass index is 40.1 kg/m².     Physical Exam  Vitals reviewed.   Constitutional:       Appearance: Healthy appearance. Not in distress.   Neck:      Vascular: No JVR. JVD normal.   Pulmonary:      Effort: Pulmonary effort is normal.      Breath sounds: Normal breath sounds. No wheezing. No rhonchi. No rales.   Chest:      Chest wall: Not tender to palpatation.   Cardiovascular:      PMI at left midclavicular line. Normal rate. Regular rhythm. Normal S1. Normal S2.       Murmurs: There is no murmur.      No gallop.  No click. No rub.   Pulses:     Intact distal pulses.   Edema:     Peripheral edema absent.   Abdominal:      General: Bowel sounds are normal.      Palpations: Abdomen is soft.      Tenderness: There is no abdominal tenderness.   Musculoskeletal: Normal range of motion.         General: No " tenderness. Skin:     General: Skin is warm and dry.   Neurological:      General: No focal deficit present.      Mental Status: Alert and oriented to person, place and time.        Diagnostic Data    ECG 12 Lead    Date/Time: 4/28/2025 2:46 PM  Performed by: Mehul Stephens MD    Authorized by: Mehul Stephens MD  Comparison: compared with previous ECG from 1/24/2025  Similar to previous ECG  Rhythm: sinus rhythm  Rate: normal  BPM: 86  Q waves: II and aVF    QRS axis: normal    Clinical impression: abnormal EKG      Assessment and Plan  Diagnoses and all orders for this visit:    Chronic diastolic congestive heart failure- On  Torsemide 20 mg, Aldactone 25mg and also Farxiga 10 mg, feels better, still SOB.. will get a BMP    Coronary artery disease involving native coronary artery of native heart without angina pectoris-= has some CP at resting, prior stent to LAD , last cath patient stents     Essential hypertension- BP is 110.60 only on Aldactone 25mg and Torsemide 20 mg    Hyperlipidemia, mixed- On Lipitor 80 mg and also Zetia    SOB (shortness of breath)- Multifactorial diastolic heart failure- Obesity, ERICA    ERICA (obstructive sleep apnea)- Does not uses CPAP         No follow-ups on file.    Mehul Stephens MD  04/28/2025

## 2025-04-29 ENCOUNTER — RESULTS FOLLOW-UP (OUTPATIENT)
Dept: CARDIOLOGY | Facility: CLINIC | Age: 75
End: 2025-04-29
Payer: MEDICARE

## 2025-04-29 LAB
BUN SERPL-MCNC: 19 MG/DL (ref 8–27)
BUN/CREAT SERPL: 15 (ref 12–28)
CALCIUM SERPL-MCNC: 9.2 MG/DL (ref 8.7–10.3)
CHLORIDE SERPL-SCNC: 107 MMOL/L (ref 96–106)
CO2 SERPL-SCNC: 17 MMOL/L (ref 20–29)
CREAT SERPL-MCNC: 1.3 MG/DL (ref 0.57–1)
EGFRCR SERPLBLD CKD-EPI 2021: 44 ML/MIN/1.73
GLUCOSE SERPL-MCNC: 96 MG/DL (ref 70–99)
POTASSIUM SERPL-SCNC: 4.1 MMOL/L (ref 3.5–5.2)
SODIUM SERPL-SCNC: 141 MMOL/L (ref 134–144)

## 2025-05-29 ENCOUNTER — TELEPHONE (OUTPATIENT)
Dept: PAIN MEDICINE | Facility: CLINIC | Age: 75
End: 2025-05-29
Payer: MEDICARE

## 2025-05-29 NOTE — TELEPHONE ENCOUNTER
PATIENT IN 10 OUT OF 10 PAIN, PAIN RADIATING FROM KNEE ON LEFT SIDE, UP THE SIDE, UNBEARABLE. APPT MOVED FROM 6/30 TO 5/30/25, INFORMED PATIENT WE WOULD CALL HER BACK TO CONFIRM AFTER DISCUSSING WITH CLINICAL POOL

## 2025-05-30 ENCOUNTER — OFFICE VISIT (OUTPATIENT)
Dept: PAIN MEDICINE | Facility: CLINIC | Age: 75
End: 2025-05-30
Payer: MEDICARE

## 2025-05-30 VITALS — BODY MASS INDEX: 41.44 KG/M2 | HEIGHT: 67 IN | WEIGHT: 264 LBS

## 2025-05-30 DIAGNOSIS — M79.2 NEUROPATHIC PAIN: ICD-10-CM

## 2025-05-30 DIAGNOSIS — M79.18 MYOFASCIAL PAIN: ICD-10-CM

## 2025-05-30 DIAGNOSIS — G89.29 CHRONIC PAIN OF LEFT KNEE: ICD-10-CM

## 2025-05-30 DIAGNOSIS — M25.562 CHRONIC PAIN OF LEFT KNEE: ICD-10-CM

## 2025-05-30 DIAGNOSIS — G89.4 CHRONIC PAIN SYNDROME: Primary | ICD-10-CM

## 2025-05-30 DIAGNOSIS — G89.4 CHRONIC PAIN SYNDROME: ICD-10-CM

## 2025-05-30 DIAGNOSIS — M70.62 GREATER TROCHANTERIC BURSITIS OF LEFT HIP: Primary | ICD-10-CM

## 2025-05-30 NOTE — PROGRESS NOTES
Referring Physician: No referring provider defined for this encounter.    Primary Physician: Juan Ramon Valenzuela MD    CHIEF COMPLAINT or REASON FOR VISIT: Follow-up (Post LEFT Greater trochanteric bursa injection ) and Greater trochanteric bursitis of left hip      Initial history of present illness on 08/16/2024:  Ms. Sabina Harkins is 75 y.o. female who presents as a new patient referral for treatment of chronic bilateral knee pain.  She has previously undergone bilateral TKA 20 years ago of her left knee and 10 years ago for her right knee.  She had excellent relief from these procedures however she has noticed over the last several years increasing pain especially in her left knee.  She is very sensitive to light touch in the left knee does not wear pants very often due to this issue.  She has been evaluated by orthopedics, Dr. Cartagena, who referred for consideration of nonsurgical management.  She is additionally undergone ongoing evaluation with Dr. Quintero for potential revision however her BMI is 40.88.  She is interested in potential therapies.    Interval history:  Patient returns to clinic today after undergoing a left greater trochanter bursa steroid injection.  Unfortunately, this did not provide her with any significant pain relief.  She continues to complain of chronic left knee pain.  This is significantly exacerbated with standing and ambulation.  Additionally, she does have soreness along the lateral side of her left thigh.  She denies any groin/back pain.  No new injuries or events.  She has tried Tylenol, meloxicam, muscle relaxers, and tramadol without relief.  She has undergone multiple therapies for the left knee including peripheral nerve stimulator.  Unfortunately, upon removal of one of her peripheral nerve stimulator she did suffer a lead fracture with a retained fragment.  She has been previously given the appropriate documentation regarding MRI  compatibility.        Interventions:  9/10/2024: Left femoral and saphenous nerve PNS 80% relief of medial knee pain  12/5/2024: Left knee genicular nerve block with 100% relief  12/19/2024: Left knee genicular nerve RFA with minimal relief  3/25/2025: Left GTB with minimal relief    Objective Pain Scoring:   BRIEF PAIN INVENTORY:  Total score:   Pain Score    05/30/25 1217   PainSc: 10-Worst pain ever   PainLoc: Hip      PHQ-2:    PHQ-9:    Opioid Risk Tool:         Review of Systems:   ROS negative except as otherwise noted     Past Medical History:   Past Medical History:   Diagnosis Date    Allergic rhinitis     Arthritis of back     Bladder disorder     Body mass index (BMI) of 40.0 to 44.9 in adult     CAD (coronary artery disease)     Chest pain     Chronic bilateral low back pain with sciatica     Chronic diastolic heart failure     Chronic pain disorder     Chronic systolic (congestive) heart failure     Colon polyp     Complex dyslipidemia     Coronary artery disease involving native coronary artery of native heart without angina pectoris     Depressive disorder     Diastolic heart failure, stage B     Dyspnea     Essential hypertension     Gait abnormality     GERD without esophagitis     Gout     Heart disease     Hip arthrosis     History of colonic polyps     Hyperglycemia     Hypokalemia     Idiopathic 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 tophus     Increased frequency of urination     Joint pain     Knee pain     LAD (linear IgA dermatosis)     Left ventricular diastolic dysfunction     Long term (current) use of opiate analgesic     Low back pain at multiple sites     Lumbar radiculitis     Mixed hyperlipidemia     Morbid obesity     Multiple vessel coronary artery disease     Myocardial infarction     Osteoarthritis of knee     Other chronic pain     Overactive bladder     Pure hypercholesterolemia     Rheumatoid arthritis     Seasonal allergies     SOB (shortness of breath)     Spinal stenosis      Urinary tract infection     Vitamin B12 deficiency     Vitamin D deficiency          Past Surgical History:   Past Surgical History:   Procedure Laterality Date    BACK SURGERY      CARDIAC CATHETERIZATION      CARDIAC CATHETERIZATION N/A 06/04/2024    Procedure: Left Heart Cath;  Surgeon: Joe Wagner MD;  Location: [facility] CATH INVASIVE LOCATION;  Service: Cardiovascular;  Laterality: N/A;    COLONOSCOPY      CORONARY STENT PLACEMENT      JOINT REPLACEMENT Bilateral     left  >20 years   Right 10 years ago    KNEE SURGERY      LUMBAR FUSION      ORTHOPEDIC SURGERY      OTHER SURGICAL HISTORY      RESECTION OF SWEAT GLAND AND ACELOUS SKIN    SPINE SURGERY      TUBAL ABDOMINAL LIGATION Bilateral          Family History   Family History   Problem Relation Age of Onset    Colon cancer Mother     Stroke Father     Hypertension Sister     Kidney disease Sister     Cardiomyopathy Brother     Stroke Other     Diabetes type II Daughter     Hypertension Daughter     Diabetes type II Son     Hypertension Son          Social History   Social History     Socioeconomic History    Marital status:    Tobacco Use    Smoking status: Never    Smokeless tobacco: Never   Vaping Use    Vaping status: Never Used   Substance and Sexual Activity    Alcohol use: Yes     Comment: SOCIALLY- monthly    Drug use: Never    Sexual activity: Defer        Medications:     Current Outpatient Medications:     allopurinol (ZYLOPRIM) 300 MG tablet, Take 1 tablet by mouth Daily. For gout prevention, Disp: 90 tablet, Rfl: 1    aspirin 81 MG EC tablet, Take 1 tablet by mouth Daily., Disp: 90 tablet, Rfl: 1    atorvastatin (LIPITOR) 80 MG tablet, Take 1 tablet by mouth Daily., Disp: 90 tablet, Rfl: 1    benzonatate (TESSALON) 200 MG capsule, Take 1 capsule by mouth 3 (Three) Times a Day As Needed for Cough. (Patient taking differently: Take 1 capsule by mouth As Needed for Cough.), Disp: 30 capsule, Rfl: 3    buPROPion XL (Wellbutrin XL)  "150 MG 24 hr tablet, Take 1 tablet by mouth Daily. For appetite and mood, Disp: 90 tablet, Rfl: 1    carvedilol (COREG) 25 MG tablet, TAKE 2 TABLETS BY MOUTH TWICE DAILY WITH MEALS, Disp: 360 tablet, Rfl: 0    Cholecalciferol (Vitamin D) 50 MCG (2000 UT) capsule, Take 2 caps po daily for vit D, Disp: 180 capsule, Rfl: 1    clopidogrel (PLAVIX) 75 MG tablet, Take 1 tablet by mouth Daily., Disp: 90 tablet, Rfl: 1    Evolocumab (REPATHA) solution auto-injector SureClick injection, Inject 1 mL under the skin into the appropriate area as directed Every 14 (Fourteen) Days., Disp: 6 mL, Rfl: 3    ezetimibe (ZETIA) 10 MG tablet, Take 1 tablet by mouth Daily., Disp: 90 tablet, Rfl: 1    Farxiga 10 MG tablet, Take 10 mg by mouth Daily., Disp: 90 tablet, Rfl: 1    fesoterodine fumarate (TOVIAZ ER) 8 MG tablet sustained-release 24 hour tablet, Take 1 tablet by mouth Daily., Disp: , Rfl:     nitroglycerin (NITROSTAT) 0.4 MG SL tablet, Place 1 tablet under the tongue Every 5 (Five) Minutes As Needed for Chest Pain. Take no more than 3 doses in 15 minutes., Disp: 30 tablet, Rfl: 3    pantoprazole (PROTONIX) 40 MG EC tablet, Take 1 tablet by mouth 2 (Two) Times a Day. For acid reflux, Disp: 180 tablet, Rfl: 1    polyethylene glycol (MIRALAX) 17 GM/SCOOP powder, Take 17 g by mouth Daily., Disp: , Rfl:     promethazine (PHENERGAN) 25 MG tablet, TAKE 1/2 TO 1 (ONE-HALF TO ONE) TABLET BY MOUTH EVERY 4 TO 6 HOURS AS NEEDED FOR NAUSEA CAUTION SEDATION, Disp: , Rfl:     sertraline (ZOLOFT) 50 MG tablet, Take 1 tablet by mouth Daily. For mood, Disp: 90 tablet, Rfl: 1    spironolactone (ALDACTONE) 25 MG tablet, Take 1 tablet by mouth Daily., Disp: 90 tablet, Rfl: 3    torsemide (DEMADEX) 20 MG tablet, Take 1 tablet by mouth Daily., Disp: 90 tablet, Rfl: 1        Physical Exam:     Vitals:    05/30/25 1217   Weight: 120 kg (264 lb)   Height: 170.2 cm (67.01\")   PainSc: 10-Worst pain ever   PainLoc: Hip        General: Alert and oriented, No " acute distress.   HEENT: Normocephalic, atraumatic.   Cardiovascular: No gross edema, obese  Respiratory: Respirations are non-labored    Bilateral knee TKA incisions  Allodynia with light touch in the anterior and medial aspect of left knee    Left thigh diffusely tender to palpation  Left GTB tender to palpation    Sensory Exam: Hypersensitive to light touch in left knee    Neurologic: Cranial Nerves II-XII are grossly intact.   Psychiatric: Cooperative.   Gait: Normal   Assistive Devices: None        Imaging Studies:   No results found for this or any previous visit.        Independent review of radiographic imaging:     Impression & Plan:       08/16/2024: Sabina Harkins is a 75 y.o. female with past medical history significant for CAD, congestive heart failure, HTN, ERICA, GERD, depression, anxiety, hypokalemia, hyperglycemia, DKA who presents to the pain clinic for evaluation and treatment of chronic left greater than right bilateral knee pain.  Evaluation consistent with chronic left knee pain, chronic and right knee pain.  There are certainly neuropathic qualities to her left knee pain with significant allodynia with light touch.  Able to ascertain skin changes due to dark skin tone.  We discussed potential therapies including genicular nerve ablation, peripheral nerve stimulation.  She would like to proceed with Sprint PNS.  9/24/2024: 2 weeks s/p Sprint PNS for left knee pain.  Doing well thus far.  No new complaints.  Will follow-up for lead pull  11/11/2024: Good relief of medial knee pain from peripheral nerve stimulator with Sprint PNS.  Did experience one lead fracture.  Patient was educated appropriately and provided documentation regarding lead fracture.  Will plan for left genicular nerve blocks for ongoing lateral knee pain.  3/6/2025: Minimal relief from left knee genicular nerve RFA.  Symptoms today consistent with left greater trochanter bursitis, myofascial pain of left thigh.  Will start  methocarbamol.  Will plan for left GTB.  5/30/2025: Continued left knee pain.  Will plan on starting Butrans 7.5 mcg/h patches.  There is certainly some type of neuropathic component to her pain.  May contemplate dorsal root ganglion stimulator trial    1. Greater trochanteric bursitis of left hip    2. Myofascial pain    3. Chronic pain of left knee    4. Chronic pain syndrome    5. Neuropathic pain                PLAN:  1. Medication Recommendations: Recommend Voltaren topical, NSAIDs, Tylenol.  Can trial turmeric 500 mg twice daily if NSAID contraindicated.  - Informed patient that she should no longer take tramadol.  She voiced understanding and states that she rarely takes this medication and has not had a recent prescription.  - Will plan on starting Butrans 7.5 mcg/h transdermal patches, 4 patches, #0 refills.  Patient's chart does specify allergy to codeine.  Chart also implies that she is unsure what reaction she has to codeine.  She states she had a surgery and the doctor believes that she may be allergic to it.   As part of this patient's treatment plan, patient will be prescribed controlled substances.  The patient has been made aware of appropriate use of such medications, including potential risk of somnolent, limited ability to drive and/or work safely, and potential for dependence or overdose.  He has been made clear that his medications refused by this patient only, without concomitant use of alcohol or other substances as prescribed.  Controlled substance status of medication discussed with patient, discussed risk of medication including abuse potential and diversion potential and need to follow-up for reevaluation appointment in order to receive further refills.  Brown was reviewed and compliant.     2. Physical Therapy: Continue HEP    3. Psychological: defer    4. Complementary and alternative (CAM) Therapies:     5. Labs/Diagnostic studies: Consider compliance UDS if medication is  continued    6. Imaging: Patient did experience lead fracture upon removal peripheral nerve stimulator on 11/11/2024..  She has been given multiple copies of this documentation.    Below is the recommendation for MRIs.  For 1.5T MRI scanner: Scan for up to 7 minutes for landmarks above the knee and 12-minute deneen for below the knee.  Wait a minimum of 4 minutes before the next imaging session.    First 3T MR scanner: Scan up for 5 minutes for landmarks above the knee and 11 for landmarks below the knee.  Wait a minimum of 4 minutes before the next imaging session.    7. Interventions:     8. Referrals: None indicated     9. Records: Brown reviewed and compliant    10. Lifestyle goals:    Follow-up 3 months      [facility]  Adriana Augustin PA-C          Quality Metrics:

## 2025-06-02 ENCOUNTER — TELEPHONE (OUTPATIENT)
Dept: PAIN MEDICINE | Facility: CLINIC | Age: 75
End: 2025-06-02
Payer: MEDICARE

## 2025-06-02 RX ORDER — BUPRENORPHINE 7.5 UG/H
1 PATCH TRANSDERMAL
Qty: 4 PATCH | Refills: 0 | Status: SHIPPED | OUTPATIENT
Start: 2025-06-02

## 2025-06-02 NOTE — TELEPHONE ENCOUNTER
Start Butrans  Butrans 7.5 mcg/h transdermal patches, 4 patches, #0 refills  Brown reviewed and compliant  Controlled substance agreement signed in office  Appropriate follow-up visit scheduled

## 2025-06-02 NOTE — TELEPHONE ENCOUNTER
Informed patient that her Butrans patches were sent to her pharmacy today. Patient expressed no further needs.

## 2025-06-02 NOTE — TELEPHONE ENCOUNTER
PATIENT CALLED AND STATED THAT THE PATCHES WERE DENIED BY INSURANCE - PLEASE REACH OUT AND ADVISE

## 2025-06-02 NOTE — TELEPHONE ENCOUNTER
attempted to contact patient to discuss medication management.  No answer.  Left voicemail to call back.

## 2025-06-04 ENCOUNTER — TELEPHONE (OUTPATIENT)
Dept: PAIN MEDICINE | Facility: CLINIC | Age: 75
End: 2025-06-04

## 2025-06-04 RX ORDER — DAPAGLIFLOZIN 10 MG/1
10 TABLET, FILM COATED ORAL DAILY
Qty: 90 TABLET | Refills: 1 | Status: SHIPPED | OUTPATIENT
Start: 2025-06-04

## 2025-06-04 NOTE — TELEPHONE ENCOUNTER
Caller: [email or web address] vázquez    Relationship: Self    Best call back number:     Requested Prescriptions:   Buprenorphine (BUTRANS) patch weekly transdermal patch     Pharmacy where request should be sent:      Last office visit with prescribing clinician: 8/16/2024   Last telemedicine visit with prescribing clinician: Visit date not found   Next office visit with prescribing clinician: 6/20/2025     Additional details provided by patient: PATIENT SPOKE WITH OUR OFFICE ON 6/2/25 AND WAS TOLD THEY WERE APPROVED BUT WHEN SHE WENT TO PHARMACY THEY WERENT.  SHE HAS NEVER GOT PATCHES BEFORE      Does the patient have less than a 3 day supply:  [x] Yes  [] No    Would you like a call back once the refill request has been completed: [] Yes [] No    If the office needs to give you a call back, can they leave a voicemail: [] Yes [] No    Roni Carlos Rep   06/04/25 14:59 EDT

## 2025-06-20 ENCOUNTER — OFFICE VISIT (OUTPATIENT)
Dept: PAIN MEDICINE | Facility: CLINIC | Age: 75
End: 2025-06-20
Payer: MEDICARE

## 2025-06-20 VITALS — WEIGHT: 259 LBS | BODY MASS INDEX: 40.65 KG/M2 | HEIGHT: 67 IN

## 2025-06-20 DIAGNOSIS — M79.2 NEUROPATHIC PAIN: ICD-10-CM

## 2025-06-20 DIAGNOSIS — M79.18 MYOFASCIAL PAIN: ICD-10-CM

## 2025-06-20 DIAGNOSIS — M25.562 CHRONIC PAIN OF LEFT KNEE: ICD-10-CM

## 2025-06-20 DIAGNOSIS — M54.16 LUMBAR RADICULOPATHY: ICD-10-CM

## 2025-06-20 DIAGNOSIS — G89.4 CHRONIC PAIN SYNDROME: Primary | ICD-10-CM

## 2025-06-20 DIAGNOSIS — G89.29 CHRONIC PAIN OF LEFT KNEE: ICD-10-CM

## 2025-06-20 DIAGNOSIS — M70.62 GREATER TROCHANTERIC BURSITIS OF LEFT HIP: ICD-10-CM

## 2025-06-20 RX ORDER — BUPRENORPHINE 10 UG/H
1 PATCH TRANSDERMAL
Qty: 4 PATCH | Refills: 0 | Status: SHIPPED | OUTPATIENT
Start: 2025-06-20

## 2025-06-20 NOTE — PROGRESS NOTES
Referring Physician: No referring provider defined for this encounter.    Primary Physician: Juan Ramon Valenzuela MD    CHIEF COMPLAINT or REASON FOR VISIT: Follow-up (Knee pain)      Initial history of present illness on 08/16/2024:  Ms. Sabina Harkins is 75 y.o. female who presents as a new patient referral for treatment of chronic bilateral knee pain.  She has previously undergone bilateral TKA 20 years ago of her left knee and 10 years ago for her right knee.  She had excellent relief from these procedures however she has noticed over the last several years increasing pain especially in her left knee.  She is very sensitive to light touch in the left knee does not wear pants very often due to this issue.  She has been evaluated by orthopedics, Dr. Cartagena, who referred for consideration of nonsurgical management.  She is additionally undergone ongoing evaluation with Dr. Quintero for potential revision however her BMI is 40.88.  She is interested in potential therapies.    Interval history:  Patient returns to clinic after starting Butrans 7.5 mcg.  She has noticed some benefit but it wore off after through the week.  Is complaining of some radiating pain down her left thigh.  Reports a history of spine surgery.        Interventions:  9/10/2024: Left femoral and saphenous nerve PNS 80% relief of medial knee pain  12/5/2024: Left knee genicular nerve block with 100% relief  12/19/2024: Left knee genicular nerve RFA with minimal relief  3/25/2025: Left GTB with minimal relief    Objective Pain Scoring:   BRIEF PAIN INVENTORY:  Total score:   Pain Score    06/20/25 1139   PainSc: 9    PainLoc: Knee      PHQ-2:    PHQ-9:    Opioid Risk Tool:         Review of Systems:   ROS negative except as otherwise noted     Past Medical History:   Past Medical History:   Diagnosis Date    Allergic rhinitis     Arthritis of back     Bladder disorder     Body mass index (BMI) of 40.0 to 44.9 in adult     CAD (coronary artery  disease)     Chest pain     Chronic bilateral low back pain with sciatica     Chronic diastolic heart failure     Chronic pain disorder     Chronic systolic (congestive) heart failure     Colon polyp     Complex dyslipidemia     Coronary artery disease involving native coronary artery of native heart without angina pectoris     Depressive disorder     Diastolic heart failure, stage B     Dyspnea     Essential hypertension     Gait abnormality     GERD without esophagitis     Gout     Heart disease     Hip arthrosis     History of colonic polyps     Hyperglycemia     Hypokalemia     Idiopathic 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 tophus     Increased frequency of urination     Joint pain     Knee pain     LAD (linear IgA dermatosis)     Left ventricular diastolic dysfunction     Long term (current) use of opiate analgesic     Low back pain at multiple sites     Lumbar radiculitis     Mixed hyperlipidemia     Morbid obesity     Multiple vessel coronary artery disease     Myocardial infarction     Osteoarthritis of knee     Other chronic pain     Overactive bladder     Pure hypercholesterolemia     Rheumatoid arthritis     Seasonal allergies     SOB (shortness of breath)     Spinal stenosis     Urinary tract infection     Vitamin B12 deficiency     Vitamin D deficiency          Past Surgical History:   Past Surgical History:   Procedure Laterality Date    BACK SURGERY      CARDIAC CATHETERIZATION      CARDIAC CATHETERIZATION N/A 06/04/2024    Procedure: Left Heart Cath;  Surgeon: Joe Wagner MD;  Location: [facility] CATH INVASIVE LOCATION;  Service: Cardiovascular;  Laterality: N/A;    COLONOSCOPY      CORONARY STENT PLACEMENT      JOINT REPLACEMENT Bilateral     left  >20 years   Right 10 years ago    KNEE SURGERY      LUMBAR FUSION      ORTHOPEDIC SURGERY      OTHER SURGICAL HISTORY      RESECTION OF SWEAT GLAND AND ACELOUS SKIN    SPINE SURGERY      TUBAL ABDOMINAL LIGATION Bilateral          Family History    Family History   Problem Relation Age of Onset    Colon cancer Mother     Stroke Father     Hypertension Sister     Kidney disease Sister     Cardiomyopathy Brother     Stroke Other     Diabetes type II Daughter     Hypertension Daughter     Diabetes type II Son     Hypertension Son          Social History   Social History     Socioeconomic History    Marital status:    Tobacco Use    Smoking status: Never    Smokeless tobacco: Never   Vaping Use    Vaping status: Never Used   Substance and Sexual Activity    Alcohol use: Yes     Comment: SOCIALLY- monthly    Drug use: Never    Sexual activity: Defer        Medications:     Current Outpatient Medications:     allopurinol (ZYLOPRIM) 300 MG tablet, Take 1 tablet by mouth Daily. For gout prevention, Disp: 90 tablet, Rfl: 1    aspirin 81 MG EC tablet, Take 1 tablet by mouth Daily., Disp: 90 tablet, Rfl: 1    atorvastatin (LIPITOR) 80 MG tablet, Take 1 tablet by mouth Daily., Disp: 90 tablet, Rfl: 1    benzonatate (TESSALON) 200 MG capsule, Take 1 capsule by mouth 3 (Three) Times a Day As Needed for Cough. (Patient taking differently: Take 1 capsule by mouth As Needed for Cough.), Disp: 30 capsule, Rfl: 3    buPROPion XL (Wellbutrin XL) 150 MG 24 hr tablet, Take 1 tablet by mouth Daily. For appetite and mood, Disp: 90 tablet, Rfl: 1    carvedilol (COREG) 25 MG tablet, TAKE 2 TABLETS BY MOUTH TWICE DAILY WITH MEALS, Disp: 360 tablet, Rfl: 0    Cholecalciferol (Vitamin D) 50 MCG (2000 UT) capsule, Take 2 caps po daily for vit D, Disp: 180 capsule, Rfl: 1    clopidogrel (PLAVIX) 75 MG tablet, Take 1 tablet by mouth Daily., Disp: 90 tablet, Rfl: 1    Evolocumab (REPATHA) solution auto-injector SureClick injection, Inject 1 mL under the skin into the appropriate area as directed Every 14 (Fourteen) Days., Disp: 6 mL, Rfl: 3    ezetimibe (ZETIA) 10 MG tablet, Take 1 tablet by mouth Daily., Disp: 90 tablet, Rfl: 1    Farxiga 10 MG tablet, Take 10 mg by mouth Daily.,  "Disp: 90 tablet, Rfl: 1    fesoterodine fumarate (TOVIAZ ER) 8 MG tablet sustained-release 24 hour tablet, Take 1 tablet by mouth Daily., Disp: , Rfl:     nitroglycerin (NITROSTAT) 0.4 MG SL tablet, Place 1 tablet under the tongue Every 5 (Five) Minutes As Needed for Chest Pain. Take no more than 3 doses in 15 minutes., Disp: 30 tablet, Rfl: 3    pantoprazole (PROTONIX) 40 MG EC tablet, Take 1 tablet by mouth 2 (Two) Times a Day. For acid reflux, Disp: 180 tablet, Rfl: 1    polyethylene glycol (MIRALAX) 17 GM/SCOOP powder, Take 17 g by mouth Daily., Disp: , Rfl:     promethazine (PHENERGAN) 25 MG tablet, TAKE 1/2 TO 1 (ONE-HALF TO ONE) TABLET BY MOUTH EVERY 4 TO 6 HOURS AS NEEDED FOR NAUSEA CAUTION SEDATION, Disp: , Rfl:     sertraline (ZOLOFT) 50 MG tablet, Take 1 tablet by mouth Daily. For mood, Disp: 90 tablet, Rfl: 1    spironolactone (ALDACTONE) 25 MG tablet, Take 1 tablet by mouth Daily., Disp: 90 tablet, Rfl: 3    torsemide (DEMADEX) 20 MG tablet, Take 1 tablet by mouth Daily., Disp: 90 tablet, Rfl: 1        Physical Exam:     Vitals:    06/20/25 1139   Weight: 117 kg (259 lb)   Height: 170.2 cm (67\")   PainSc: 9    PainLoc: Knee        General: Alert and oriented, No acute distress.   HEENT: Normocephalic, atraumatic.   Cardiovascular: No gross edema, obese  Respiratory: Respirations are non-labored    Sensory Exam: Hypersensitive to light touch in left knee    Neurologic: Cranial Nerves II-XII are grossly intact.   Psychiatric: Cooperative.   Gait: Normal   Assistive Devices: None        Imaging Studies:   No results found for this or any previous visit.        Independent review of radiographic imaging:     Impression & Plan:       08/16/2024: Sabina Harkins is a 75 y.o. female with past medical history significant for CAD, congestive heart failure, HTN, ERICA, GERD, depression, anxiety, hypokalemia, hyperglycemia, DKA who presents to the pain clinic for evaluation and treatment of chronic left greater than " right bilateral knee pain.  Evaluation consistent with chronic left knee pain, chronic and right knee pain.  There are certainly neuropathic qualities to her left knee pain with significant allodynia with light touch.  Able to ascertain skin changes due to dark skin tone.  We discussed potential therapies including genicular nerve ablation, peripheral nerve stimulation.  She would like to proceed with Sprint PNS.  9/24/2024: 2 weeks s/p Sprint PNS for left knee pain.  Doing well thus far.  No new complaints.  Will follow-up for lead pull  11/11/2024: Good relief of medial knee pain from peripheral nerve stimulator with Sprint PNS.  Did experience one lead fracture.  Patient was educated appropriately and provided documentation regarding lead fracture.  Will plan for left genicular nerve blocks for ongoing lateral knee pain.  3/6/2025: Minimal relief from left knee genicular nerve RFA.  Symptoms today consistent with left greater trochanter bursitis, myofascial pain of left thigh.  Will start methocarbamol.  Will plan for left GTB.  5/30/2025: Continued left knee pain.  Will plan on starting Butrans 7.5 mcg/h patches.  There is certainly some type of neuropathic component to her pain.  May contemplate dorsal root ganglion stimulator trial  6/20/2025: Will increase Butrans and order lumbar MRI to assess for possible radiculopathy    1. Chronic pain syndrome    2. Neuropathic pain    3. Chronic pain of left knee    4. Myofascial pain    5. Greater trochanteric bursitis of left hip    6. Lumbar radiculopathy                  PLAN:  1. Medication Recommendations:  - Increase Butrans to 10 mcg/h for patches no refills  -As part of this patient's treatment plan, patient may be prescribed controlled substances. The patient has been made aware of appropriate use of such medications, including potential risk of somnolence, limited ability to drive and /or work safely, and potential for dependence or overdose. It has also been  made clear that these medications are for use by this patient only, without concomitant use of alcohol or other substances unless prescribed. Controlled substance status of medication discussed with patient, discussed risks of medication including abuse potential and diversion potential and need to follow up for reevaluation appointment in order to receive further refills.  Brown was reviewed and compliant.       2. Physical Therapy: Continue HEP    3. Psychological: defer    4. Complementary and alternative (CAM) Therapies:     5. Labs/Diagnostic studies: Consider compliance UDS    6. Imaging: Obtain lumbar MRI without contrast    7. Interventions:     8. Referrals: None indicated     9. Records:     10. Lifestyle goals:    Follow-up 1 month    Dr. Karel Win MD  [facility]        Quality Metrics:

## 2025-06-27 ENCOUNTER — TELEPHONE (OUTPATIENT)
Dept: PAIN MEDICINE | Facility: CLINIC | Age: 75
End: 2025-06-27
Payer: MEDICARE

## 2025-06-27 NOTE — TELEPHONE ENCOUNTER
Caller: YORDY  Relationship to Patient:  SELF  Phone Number: [phone]  Reason For Call:  PATIENT CALLING FOR AN UPDATE ON HER MRI REFERRAL, REFERRAL STATES [facility] AND OPEN MRI BUT DOESN'T SHOW IT WAS SENT

## 2025-06-30 ENCOUNTER — OFFICE VISIT (OUTPATIENT)
Dept: FAMILY MEDICINE CLINIC | Facility: CLINIC | Age: 75
End: 2025-06-30
Payer: MEDICARE

## 2025-06-30 VITALS
HEIGHT: 67 IN | HEART RATE: 94 BPM | SYSTOLIC BLOOD PRESSURE: 114 MMHG | WEIGHT: 261.2 LBS | BODY MASS INDEX: 41 KG/M2 | DIASTOLIC BLOOD PRESSURE: 82 MMHG | OXYGEN SATURATION: 98 %

## 2025-06-30 DIAGNOSIS — M1A.0790 IDIOPATHIC CHRONIC GOUT OF ANKLE WITHOUT TOPHUS, UNSPECIFIED LATERALITY: ICD-10-CM

## 2025-06-30 DIAGNOSIS — Z13.820 ENCOUNTER FOR OSTEOPOROSIS SCREENING IN ASYMPTOMATIC POSTMENOPAUSAL PATIENT: ICD-10-CM

## 2025-06-30 DIAGNOSIS — Z12.31 SCREENING MAMMOGRAM FOR BREAST CANCER: ICD-10-CM

## 2025-06-30 DIAGNOSIS — Z78.0 ENCOUNTER FOR OSTEOPOROSIS SCREENING IN ASYMPTOMATIC POSTMENOPAUSAL PATIENT: ICD-10-CM

## 2025-06-30 DIAGNOSIS — I50.32 CHRONIC DIASTOLIC CONGESTIVE HEART FAILURE: Chronic | ICD-10-CM

## 2025-06-30 DIAGNOSIS — R73.9 HYPERGLYCEMIA: ICD-10-CM

## 2025-06-30 DIAGNOSIS — N32.81 OVERACTIVE BLADDER: Chronic | ICD-10-CM

## 2025-06-30 DIAGNOSIS — M1A.0720 IDIOPATHIC CHRONIC GOUT OF LEFT ANKLE WITHOUT TOPHUS: Chronic | ICD-10-CM

## 2025-06-30 DIAGNOSIS — E66.813 CLASS 3 SEVERE OBESITY DUE TO EXCESS CALORIES WITH SERIOUS COMORBIDITY AND BODY MASS INDEX (BMI) OF 40.0 TO 44.9 IN ADULT: ICD-10-CM

## 2025-06-30 DIAGNOSIS — K21.9 GASTROESOPHAGEAL REFLUX DISEASE WITHOUT ESOPHAGITIS: ICD-10-CM

## 2025-06-30 DIAGNOSIS — I25.10 CORONARY ARTERY DISEASE INVOLVING NATIVE CORONARY ARTERY OF NATIVE HEART WITHOUT ANGINA PECTORIS: Chronic | ICD-10-CM

## 2025-06-30 DIAGNOSIS — R25.2 MUSCLE CRAMPS: ICD-10-CM

## 2025-06-30 DIAGNOSIS — G47.33 OSA (OBSTRUCTIVE SLEEP APNEA): Chronic | ICD-10-CM

## 2025-06-30 DIAGNOSIS — F32.A DEPRESSIVE DISORDER: ICD-10-CM

## 2025-06-30 DIAGNOSIS — N18.31 STAGE 3A CHRONIC KIDNEY DISEASE: ICD-10-CM

## 2025-06-30 DIAGNOSIS — Z00.00 GENERAL MEDICAL EXAM: Primary | ICD-10-CM

## 2025-06-30 DIAGNOSIS — I10 ESSENTIAL HYPERTENSION: Chronic | ICD-10-CM

## 2025-06-30 DIAGNOSIS — E78.2 HYPERLIPIDEMIA, MIXED: ICD-10-CM

## 2025-06-30 PROCEDURE — 99214 OFFICE O/P EST MOD 30 MIN: CPT | Performed by: FAMILY MEDICINE

## 2025-06-30 PROCEDURE — 3079F DIAST BP 80-89 MM HG: CPT | Performed by: FAMILY MEDICINE

## 2025-06-30 PROCEDURE — G2211 COMPLEX E/M VISIT ADD ON: HCPCS | Performed by: FAMILY MEDICINE

## 2025-06-30 PROCEDURE — 1160F RVW MEDS BY RX/DR IN RCRD: CPT | Performed by: FAMILY MEDICINE

## 2025-06-30 PROCEDURE — 1125F AMNT PAIN NOTED PAIN PRSNT: CPT | Performed by: FAMILY MEDICINE

## 2025-06-30 PROCEDURE — 3074F SYST BP LT 130 MM HG: CPT | Performed by: FAMILY MEDICINE

## 2025-06-30 PROCEDURE — 1159F MED LIST DOCD IN RCRD: CPT | Performed by: FAMILY MEDICINE

## 2025-06-30 PROCEDURE — 3044F HG A1C LEVEL LT 7.0%: CPT | Performed by: FAMILY MEDICINE

## 2025-06-30 PROCEDURE — G0439 PPPS, SUBSEQ VISIT: HCPCS | Performed by: FAMILY MEDICINE

## 2025-06-30 RX ORDER — BUPROPION HYDROCHLORIDE 150 MG/1
150 TABLET ORAL DAILY
Qty: 90 TABLET | Refills: 1 | Status: SHIPPED | OUTPATIENT
Start: 2025-06-30

## 2025-06-30 RX ORDER — EZETIMIBE 10 MG/1
10 TABLET ORAL DAILY
Qty: 90 TABLET | Refills: 1 | Status: SHIPPED | OUTPATIENT
Start: 2025-06-30

## 2025-06-30 RX ORDER — PANTOPRAZOLE SODIUM 40 MG/1
40 TABLET, DELAYED RELEASE ORAL 2 TIMES DAILY
Qty: 180 TABLET | Refills: 1 | Status: SHIPPED | OUTPATIENT
Start: 2025-06-30

## 2025-06-30 RX ORDER — SPIRONOLACTONE 25 MG/1
25 TABLET ORAL DAILY
Qty: 90 TABLET | Refills: 1 | Status: SHIPPED | OUTPATIENT
Start: 2025-06-30

## 2025-06-30 RX ORDER — ALLOPURINOL 300 MG/1
300 TABLET ORAL DAILY
Qty: 90 TABLET | Refills: 1 | Status: SHIPPED | OUTPATIENT
Start: 2025-06-30

## 2025-06-30 RX ORDER — TORSEMIDE 20 MG/1
20 TABLET ORAL DAILY
Qty: 90 TABLET | Refills: 1 | Status: SHIPPED | OUTPATIENT
Start: 2025-06-30

## 2025-06-30 RX ORDER — ASPIRIN 81 MG/1
81 TABLET ORAL DAILY
Qty: 90 TABLET | Refills: 1 | Status: SHIPPED | OUTPATIENT
Start: 2025-06-30

## 2025-06-30 NOTE — ASSESSMENT & PLAN NOTE
Orders:    pantoprazole (PROTONIX) 40 MG EC tablet; Take 1 tablet by mouth 2 (Two) Times a Day. For acid reflux

## 2025-06-30 NOTE — ASSESSMENT & PLAN NOTE
Doing well with current regimen.  Medications refilled.  Awaiting recheck on renal function and electrolytes with lab work      Ongoing cardiology follow-up.

## 2025-06-30 NOTE — ASSESSMENT & PLAN NOTE
{Hyperlipidemia A/P Block (Optional):[id number]}    Orders:    CBC & Differential    Comprehensive Metabolic Panel    Lipid Panel    TSH    T4, Free    CK

## 2025-06-30 NOTE — ASSESSMENT & PLAN NOTE
Orders:    allopurinol (ZYLOPRIM) 300 MG tablet; Take 1 tablet by mouth Daily. For gout prevention

## 2025-06-30 NOTE — ASSESSMENT & PLAN NOTE
Coronary artery disease is unchanged.  Continue current treatment regimen.  Cardiac status will be reassessed in 6 months.

## 2025-06-30 NOTE — ASSESSMENT & PLAN NOTE
Orders:    CBC & Differential    Comprehensive Metabolic Panel    TSH    T4, Free    Magnesium

## 2025-06-30 NOTE — ASSESSMENT & PLAN NOTE
Patient's (Body mass index is 40.91 kg/m².) indicates that they are morbidly/severely obese (BMI > 40 or > 35 with obesity - related health condition) with health conditions that include hypertension, diabetes mellitus, dyslipidemias, and GERD . Weight is unchanged. BMI  is above average; BMI management plan is completed. We discussed portion control and increasing exercise.

## 2025-06-30 NOTE — ASSESSMENT & PLAN NOTE
Encouraged better efforts with hydration/water intake and avoiding caffeine    Will recheck electrolytes and magnesium with CK and thyroid studies when she returns for fasting labs

## 2025-06-30 NOTE — ASSESSMENT & PLAN NOTE
{CHF (Optional):83727}        Orders:    ezetimibe (ZETIA) 10 MG tablet; Take 1 tablet by mouth Daily.    torsemide (DEMADEX) 20 MG tablet; Take 1 tablet by mouth Daily.    spironolactone (ALDACTONE) 25 MG tablet; Take 1 tablet by mouth Daily.

## 2025-06-30 NOTE — PROGRESS NOTES
Subjective   The ABCs of the Annual Wellness Visit  Medicare Wellness Visit      Sabina Harkins is a 75 y.o. patient who presents for a Medicare Wellness Visit.    The following portions of the patient's history were reviewed and   updated as appropriate: allergies, current medications, past family history, past medical history, past social history, past surgical history, and problem list.    Compared to one year ago, the patient's physical   health is the same.  Compared to one year ago, the patient's mental   health is the same.    Recent Hospitalizations:  She was not admitted to the hospital during the last year.     Current Medical Providers:  Patient Care Team:  Juan Ramon Valenzuela MD as PCP - General (Family Medicine)  Juan Ramon Valenzuela MD as Consulting Physician (Family Medicine)  Carlos Chou MD (Urology)  Joe Wagner MD as Consulting Physician (Cardiology)  Mehul Stephens MD as Consulting Physician (Cardiology)  Carlos Cartagena MD as Consulting Physician (Orthopedic Surgery)  Karel Win MD as Consulting Physician (Pain Medicine)    Outpatient Medications Prior to Visit   Medication Sig Dispense Refill    atorvastatin (LIPITOR) 80 MG tablet Take 1 tablet by mouth Daily. 90 tablet 1    benzonatate (TESSALON) 200 MG capsule Take 1 capsule by mouth 3 (Three) Times a Day As Needed for Cough. (Patient taking differently: Take 1 capsule by mouth As Needed for Cough.) 30 capsule 3    Buprenorphine (Butrans) 10 MCG/HR patch weekly Place 1 patch on the skin as directed by provider Every 7 (Seven) Days. 4 patch 0    carvedilol (COREG) 25 MG tablet TAKE 2 TABLETS BY MOUTH TWICE DAILY WITH MEALS 360 tablet 0    Cholecalciferol (Vitamin D) 50 MCG (2000 UT) capsule Take 2 caps po daily for vit D 180 capsule 1    clopidogrel (PLAVIX) 75 MG tablet Take 1 tablet by mouth Daily. 90 tablet 1    Evolocumab (REPATHA) solution auto-injector SureClick injection Inject 1 mL under the skin  into the appropriate area as directed Every 14 (Fourteen) Days. 6 mL 3    Farxiga 10 MG tablet Take 10 mg by mouth Daily. 90 tablet 1    fesoterodine fumarate (TOVIAZ ER) 8 MG tablet sustained-release 24 hour tablet Take 1 tablet by mouth Daily.      nitroglycerin (NITROSTAT) 0.4 MG SL tablet Place 1 tablet under the tongue Every 5 (Five) Minutes As Needed for Chest Pain. Take no more than 3 doses in 15 minutes. 30 tablet 3    polyethylene glycol (MIRALAX) 17 GM/SCOOP powder Take 17 g by mouth Daily.      promethazine (PHENERGAN) 25 MG tablet TAKE 1/2 TO 1 (ONE-HALF TO ONE) TABLET BY MOUTH EVERY 4 TO 6 HOURS AS NEEDED FOR NAUSEA CAUTION SEDATION      allopurinol (ZYLOPRIM) 300 MG tablet Take 1 tablet by mouth Daily. For gout prevention 90 tablet 1    aspirin 81 MG EC tablet Take 1 tablet by mouth Daily. 90 tablet 1    buPROPion XL (Wellbutrin XL) 150 MG 24 hr tablet Take 1 tablet by mouth Daily. For appetite and mood 90 tablet 1    ezetimibe (ZETIA) 10 MG tablet Take 1 tablet by mouth Daily. 90 tablet 1    pantoprazole (PROTONIX) 40 MG EC tablet Take 1 tablet by mouth 2 (Two) Times a Day. For acid reflux 180 tablet 1    sertraline (ZOLOFT) 50 MG tablet Take 1 tablet by mouth Daily. For mood 90 tablet 1    spironolactone (ALDACTONE) 25 MG tablet Take 1 tablet by mouth Daily. 90 tablet 3    torsemide (DEMADEX) 20 MG tablet Take 1 tablet by mouth Daily. 90 tablet 1     No facility-administered medications prior to visit.     Opioid medication/s are on active medication list.  and I have evaluated her active treatment plan and pain score trends (see table).  Vitals:    06/30/25 1407   PainSc: 8    PainLoc: Knee     I have reviewed the chart for potential of high risk medication and harmful drug interactions in the elderly.        Aspirin is on active medication list. Aspirin use is indicated based on review of current medical condition/s. Pros and cons of this therapy have been discussed today. Benefits of this  "medication outweigh potential harm.  Patient has been encouraged to continue taking this medication.  .      Patient Active Problem List   Diagnosis    Coronary artery disease involving native coronary artery of native heart without angina pectoris    Chronic diastolic congestive heart failure    Essential hypertension    ERICA (obstructive sleep apnea)    Gastroesophageal reflux disease    Depressive disorder    Anxiety state    Vitamin D deficiency    Idiopathic chronic gout of ankle without tophus    Overactive bladder    Chronic bilateral low back pain without sciatica    Generalized osteoarthritis of multiple sites    Gait instability    Hypokalemia    General medical exam    Vitamin B12 deficiency    Hyperglycemia    Stage 3a chronic kidney disease    Hyperlipidemia, mixed    Class 3 severe obesity due to excess calories with serious comorbidity and body mass index (BMI) of 40.0 to 44.9 in adult    Bilateral chronic knee pain    Ganglion cyst of joint of finger of right hand    SOB (shortness of breath)    Influenza B    Muscle cramps    Urinary tract infection     Advance Care Planning Advance Directive is not on file.  ACP discussion was held with the patient during this visit. Patient does not have an advance directive, information provided.            Objective   Vitals:    06/30/25 1407   BP: 114/82   BP Location: Left arm   Patient Position: Sitting   Cuff Size: Large Adult   Pulse: 94   SpO2: 98%   Weight: 118 kg (261 lb 3.2 oz)   Height: 170.2 cm (67\")   PainSc: 8    PainLoc: Knee       Estimated body mass index is 40.91 kg/m² as calculated from the following:    Height as of this encounter: 170.2 cm (67\").    Weight as of this encounter: 118 kg (261 lb 3.2 oz).                Does the patient have evidence of cognitive impairment? No                                                                                                Health  Risk Assessment    Smoking Status:  Social History     Tobacco Use "   Smoking Status Never   Smokeless Tobacco Never     Alcohol Consumption:  Social History     Substance and Sexual Activity   Alcohol Use Yes    Comment: SOCIALLY- monthly       Fall Risk Screen  MANNYADI Fall Risk Assessment was completed, and patient is at LOW risk for falls.Assessment completed on:2025    Depression Screening   Little interest or pleasure in doing things? Not at all   Feeling down, depressed, or hopeless? Several days   PHQ-2 Total Score 1      Health Habits and Functional and Cognitive Screenin/23/2025    11:44 AM   Functional & Cognitive Status   Do you have difficulty preparing food and eating? No   Do you have difficulty bathing yourself, getting dressed or grooming yourself? No   Do you have difficulty using the toilet? No   Do you have difficulty moving around from place to place? No   Do you have trouble with steps or getting out of a bed or a chair? No   Current Diet Other   Dental Exam Not up to date   Eye Exam Up to date   Exercise (times per week) 0 times per week   Current Exercises Include No Regular Exercise   Do you need help using the phone?  No   Are you deaf or do you have serious difficulty hearing?  No   Do you need help to go to places out of walking distance? Yes   Do you need help shopping? Yes   Do you need help preparing meals?  No   Do you need help with housework?  No   Do you need help with laundry? No   Do you need help taking your medications? No   Do you need help managing money? No   Do you ever drive or ride in a car without wearing a seat belt? No   Have you felt unusual fatigue (could be tiredness), stress, anger or loneliness in the last month? No    Who do you live with? Child   If you need help, do you have trouble finding someone available to you? No   Have you been bothered in the last four weeks by sexual problems? No   Do you have difficulty concentrating, remembering or making decisions? No       Data saved with a previous flowsheet row  definition           Age-appropriate Screening Schedule:  Refer to the list below for future screening recommendations based on patient's age, sex and/or medical conditions. Orders for these recommended tests are listed in the plan section. The patient has been provided with a written plan.    Health Maintenance List  Health Maintenance   Topic Date Due    DXA SCAN  04/04/2025    MAMMOGRAM  04/04/2025    INFLUENZA VACCINE  07/01/2025    LIPID PANEL  01/17/2026    ANNUAL WELLNESS VISIT  06/30/2026    COLORECTAL CANCER SCREENING  03/08/2029    HEPATITIS C SCREENING  Completed    Pneumococcal Vaccine 50+  Completed    COVID-19 Vaccine  Discontinued    HEMOGLOBIN A1C  Discontinued    TDAP/TD VACCINES  Discontinued    ZOSTER VACCINE  Discontinued                                                                                                                                                CMS Preventative Services Quick Reference  Risk Factors Identified During Encounter  Fall Risk-High or Moderate: Discussed Fall Prevention in the home    The above risks/problems have been discussed with the patient.  Pertinent information has been shared with the patient in the After Visit Summary.  An After Visit Summary and PPPS were made available to the patient.    Follow Up:   Next Medicare Wellness visit to be scheduled in 1 year.         Additional E&M Note during same encounter follows:  Patient has additional, significant, and separately identifiable condition(s)/problem(s) that require work above and beyond the Medicare Wellness Visit     Chief Complaint  HTN, CKD, Gout, OAB, Medication refills     Chronic back/knee/hip pain    R thumb nodule enlarging and becoming symptomatic    Subjective   HPI  Sabina is also being seen today for additional medical problem/s.  HTN, CKD, Gout, OAB, Medication refills     Chronic back/knee/hip pain    R thumb nodule enlarging and becoming symptomatic      Subjective    History of Present  Illness:  Sabina Harkins is a 74 y.o. female who presents today for follow-up visit and medication refills    Still with chronic back and L hip/knee pain. Has undergone workup with interventional pain management and is on butrans patches with pain reduction most days only down to 8/10    No chest pain or chest pressure.    She has done well with Toviaz for overactive bladder symptoms.  She did fail Botox bladder injections, Ditropan, Ditropan XL, Detrol, and Detrol LA.      She does continue to have ongoing problems with chronic back and osteoarthritic pain.  She is off NSAIDs given concerns with chronic kidney disease and also given her current medications from cardiology.  We did try tramadol in the past but this was ineffective.  Gabapentin caused problems with hallucinations.  She was on Lyrica in the past from neurosurgery and she feels this may have helped but she wants to wait on restarting anything today and understands this is a controlled substance if she decides she wants to try Lyrica again she would need an office visit, UDS, and controlled substance agreement completed with me.    No gout flareups on allopurinol.    Mild depression doing well with combination of Zoloft and Wellbutrin.     GERD controlled with twice daily Protonix after she failed once daily dosing and on PPI treatment.     Ongoing cardiology follow-up for management of her coronary artery disease and hyperlipidemia.  She was unable to afford Farxiga and Repatha injections but does continue on her current regimen for chronic congestive heart failure and hyperlipidemia with hypertension.     Arthritic workup with her labs in June 2023 did return negative for autoimmune and rheumatoid arthritis.    Ready to setup past-due mammogram and dexa    Colonoscopy 3/2024 with Dr Arenas at [facility] [place] with [place]ps.  Due for repeat in 5 yrs (March 2029)      Review of Systems   Constitutional:  Positive for fatigue. Negative for activity change, appetite change,  "chills and fever.   HENT:  Negative for ear pain, hearing loss and trouble swallowing.    Eyes:  Negative for pain and visual disturbance.   Respiratory:  Negative for cough, chest tightness, shortness of breath and wheezing.    Cardiovascular:  Negative for chest pain, palpitations and leg swelling.   Gastrointestinal:  Negative for abdominal pain and blood in stool.   Genitourinary:  Negative for difficulty urinating.   Musculoskeletal:  Positive for arthralgias and back pain. Negative for joint swelling.   Skin:  Negative for rash.   Neurological:  Positive for weakness. Negative for dizziness and light-headedness.   Psychiatric/Behavioral:  Negative for agitation, behavioral problems, dysphoric mood and sleep disturbance.               Objective   Vital Signs:  /82 (BP Location: Left arm, Patient Position: Sitting, Cuff Size: Large Adult)   Pulse 94   Ht 170.2 cm (67\")   Wt 118 kg (261 lb 3.2 oz)   SpO2 98%   BMI 40.91 kg/m²   Physical Exam  Constitutional:       Appearance: She is obese.   HENT:      Head: Normocephalic.      Right Ear: External ear normal.      Left Ear: External ear normal.      Nose: Nose normal.   Eyes:      Pupils: Pupils are equal, round, and reactive to light.   Cardiovascular:      Rate and Rhythm: Normal rate and regular rhythm.      Heart sounds: Normal heart sounds. No murmur heard.     No friction rub. No gallop.   Pulmonary:      Effort: Pulmonary effort is normal.      Breath sounds: Normal breath sounds.   Musculoskeletal:      Comments: Chronic back and hip/knee pain on L side, using cane for support.  Has ortho consult to help with possible underlying knee/hip pain contributing tomorrow    Failed injections with pain management.    On buprenorphine patches.     R hand with thumb nodule enlarging.  ?bone cyst vs ganglion cyst - will discuss with ortho about tx or hand sgy referral    Skin:     General: Skin is warm and dry.   Neurological:      General: No focal " deficit present.      Mental Status: She is alert and oriented to person, place, and time.      Motor: Weakness present.      Coordination: Coordination abnormal.      Gait: Gait abnormal.   Psychiatric:         Mood and Affect: Mood normal.         Behavior: Behavior normal.         Thought Content: Thought content normal.                    Assessment and Plan     Diagnoses and all orders for this visit:    1. General medical exam (Primary)  Assessment & Plan:  Discussed together health maintenance and screening along with vaccination options and healthy diet and exercise habits as part of the preventative counseling at their physical exam today.     Orders:  -     CBC & Differential  -     Comprehensive Metabolic Panel  -     Lipid Panel  -     TSH  -     T4, Free    2. Stage 3a chronic kidney disease  Assessment & Plan:  Awaiting recheck on renal function when she returns to get fasting lab work done      Orders:  -     CBC & Differential  -     Comprehensive Metabolic Panel  -     Lipid Panel  -     TSH  -     T4, Free    3. Coronary artery disease involving native coronary artery of native heart without angina pectoris  Assessment & Plan:  Coronary artery disease is unchanged.  Continue current treatment regimen.  Cardiac status will be reassessed in 6 months.        Orders:  -     CBC & Differential  -     Comprehensive Metabolic Panel  -     Lipid Panel  -     TSH  -     T4, Free  -     aspirin 81 MG EC tablet; Take 1 tablet by mouth Daily.  Dispense: 90 tablet; Refill: 1  -     ezetimibe (ZETIA) 10 MG tablet; Take 1 tablet by mouth Daily.  Dispense: 90 tablet; Refill: 1    4. Essential hypertension  Assessment & Plan:  Hypertension is improving with treatment.  Continue current treatment regimen.  Blood pressure will be reassessed at the next regular appointment.    Orders:  -     CBC & Differential  -     Comprehensive Metabolic Panel  -     Lipid Panel  -     TSH  -     T4, Free  -     Magnesium    5.  Hyperlipidemia, mixed  Assessment & Plan:   Lipid abnormalities are improving with treatment    Plan:  Continue same medication/s without change.      Discussed medication dosage, use, side effects, and goals of treatment in detail.    Counseled patient on lifestyle modifications to help control hyperlipidemia.     Patient Treatment Goals:   LDL goal is under 100    Followup at the next regular appointment.    Orders:  -     CBC & Differential  -     Comprehensive Metabolic Panel  -     Lipid Panel  -     TSH  -     T4, Free  -     CK    6. Idiopathic chronic gout of left ankle without tophus  Assessment & Plan:  Continue allopurinol.  We will check uric acid with labs    Orders:  -     CBC & Differential  -     Comprehensive Metabolic Panel  -     Uric acid    7. ERICA (obstructive sleep apnea)  Assessment & Plan:  Encouraged CPAP use      8. Overactive bladder  Assessment & Plan:  Continues treatment with Toviaz    She does continue to follow-up with Dr. Chou as well      9. Chronic diastolic congestive heart failure  Assessment & Plan:  Doing well with current regimen.  Medications refilled.  Awaiting recheck on renal function and electrolytes with lab work      Ongoing cardiology follow-up.        Orders:  -     ezetimibe (ZETIA) 10 MG tablet; Take 1 tablet by mouth Daily.  Dispense: 90 tablet; Refill: 1  -     torsemide (DEMADEX) 20 MG tablet; Take 1 tablet by mouth Daily.  Dispense: 90 tablet; Refill: 1  -     spironolactone (ALDACTONE) 25 MG tablet; Take 1 tablet by mouth Daily.  Dispense: 90 tablet; Refill: 1    10. Screening mammogram for breast cancer  -     Mammo Screening Digital Tomosynthesis Bilateral With CAD; Future    11. Encounter for osteoporosis screening in asymptomatic postmenopausal patient  -     DEXA Bone Density Axial; Future    12. Muscle cramps  Assessment & Plan:  Encouraged better efforts with hydration/water intake and avoiding caffeine    Will recheck electrolytes and magnesium with  CK and thyroid studies when she returns for fasting labs    Orders:  -     CBC & Differential  -     Comprehensive Metabolic Panel  -     TSH  -     T4, Free  -     Magnesium    13. Hyperglycemia  Assessment & Plan:  Awaiting recheck on A1c with labs    Orders:  -     Hemoglobin A1c    14. Idiopathic chronic gout of ankle without tophus, unspecified laterality  Assessment & Plan:  Continue allopurinol.  We will check uric acid with labs    Orders:  -     allopurinol (ZYLOPRIM) 300 MG tablet; Take 1 tablet by mouth Daily. For gout prevention  Dispense: 90 tablet; Refill: 1    15. Depressive disorder  Assessment & Plan:  Patient's depression is recurrent and is mild without psychosis. Their depression is currently in partial remission and the condition is improving with treatment. This will be reassessed at the next regular appointment. F/U as described:patient will continue current medication therapy.    Orders:  -     buPROPion XL (Wellbutrin XL) 150 MG 24 hr tablet; Take 1 tablet by mouth Daily. For appetite and mood  Dispense: 90 tablet; Refill: 1  -     sertraline (ZOLOFT) 50 MG tablet; Take 1 tablet by mouth Daily. For mood  Dispense: 90 tablet; Refill: 1    16. Gastroesophageal reflux disease without esophagitis  Assessment & Plan:  Controlled with Protonix    Orders:  -     pantoprazole (PROTONIX) 40 MG EC tablet; Take 1 tablet by mouth 2 (Two) Times a Day. For acid reflux  Dispense: 180 tablet; Refill: 1    17. Class 3 severe obesity due to excess calories with serious comorbidity and body mass index (BMI) of 40.0 to 44.9 in adult  Assessment & Plan:  Patient's (Body mass index is 40.91 kg/m².) indicates that they are morbidly/severely obese (BMI > 40 or > 35 with obesity - related health condition) with health conditions that include hypertension, diabetes mellitus, dyslipidemias, and GERD . Weight is unchanged. BMI  is above average; BMI management plan is completed. We discussed portion control and  increasing exercise.                 Follow Up   Return in about 6 months (around 12/30/2025) for Med recheck.  Patient was given instructions and counseling regarding her condition or for health maintenance advice. Please see specific information pulled into the AVS if appropriate.

## 2025-06-30 NOTE — ASSESSMENT & PLAN NOTE
{Coronary Artery Disease (OPTIONAL):23636}    Orders:    CBC & Differential    Comprehensive Metabolic Panel    Lipid Panel    TSH    T4, Free    aspirin 81 MG EC tablet; Take 1 tablet by mouth Daily.    ezetimibe (ZETIA) 10 MG tablet; Take 1 tablet by mouth Daily.

## 2025-06-30 NOTE — ASSESSMENT & PLAN NOTE
{Hypertension is (optional):5827807592}    Orders:    CBC & Differential    Comprehensive Metabolic Panel    Lipid Panel    TSH    T4, Free    Magnesium

## 2025-06-30 NOTE — ASSESSMENT & PLAN NOTE
Patient's (Body mass index is 40.91 kg/m².) indicates that they are {obesity categories w/o overweight:6372577540} with health conditions that include {obesity comorbidities:27087} . Weight is {new/improving/stable/worsenin}. BMI  {[facility]59411}. We discussed {obesity treatment:03406}.

## 2025-06-30 NOTE — ASSESSMENT & PLAN NOTE
{Renal Disease A/P (Optional):45795}    Orders:    CBC & Differential    Comprehensive Metabolic Panel    Lipid Panel    TSH    T4, Free

## 2025-06-30 NOTE — ASSESSMENT & PLAN NOTE
Orders:    CBC & Differential    Comprehensive Metabolic Panel    Lipid Panel    TSH    T4, Free

## 2025-06-30 NOTE — ASSESSMENT & PLAN NOTE
{Depression A/P Block (Optional):03880}    Orders:    buPROPion XL (Wellbutrin XL) 150 MG 24 hr tablet; Take 1 tablet by mouth Daily. For appetite and mood    sertraline (ZOLOFT) 50 MG tablet; Take 1 tablet by mouth Daily. For mood

## 2025-07-01 ENCOUNTER — OFFICE VISIT (OUTPATIENT)
Dept: ORTHOPEDIC SURGERY | Facility: CLINIC | Age: 75
End: 2025-07-01
Payer: MEDICARE

## 2025-07-01 VITALS
BODY MASS INDEX: 40.97 KG/M2 | WEIGHT: 261 LBS | SYSTOLIC BLOOD PRESSURE: 124 MMHG | DIASTOLIC BLOOD PRESSURE: 86 MMHG | HEIGHT: 67 IN

## 2025-07-01 DIAGNOSIS — M16.12 PRIMARY OSTEOARTHRITIS OF LEFT HIP: ICD-10-CM

## 2025-07-01 DIAGNOSIS — M25.552 LEFT HIP PAIN: Primary | ICD-10-CM

## 2025-07-01 DIAGNOSIS — Z96.653 STATUS POST BILATERAL KNEE REPLACEMENTS: ICD-10-CM

## 2025-07-01 NOTE — PROGRESS NOTES
[facility] Office Visit     Office Visit       Date: 07/01/2025   Patient Name: Sabina Harkins  MRN: [id number]  YOB: 1950    Referring Physician: No ref. provider found     Chief Complaint:   Chief Complaint   Patient presents with    Left Hip - Pain       History of Present Illness:   Sabina Harkins is a 75 y.o. female who presents with left hip pain for several  year(s). Onset atraumatic and gradual in nature. Pain is localized to is radiating down leg and is a 8/10 on the pain scale.Pain is described as dull, aching, burning, throbbing, stabbing, and shooting. Associated symptoms include pain.  The pain is worse with any movement of the joint; nothing  improve the pain. Previous treatments have included: cane/walker and NSAIDS .. Although some transient relief was reported with these interventions, these conservative measures have failed and symptoms have persisted. The patient is limited in daily activities and has had a significant decrease in quality of life as a result. She denies fevers, chills, or constitutional symptoms.    Subjective   Review of Systems: Review of Systems   Constitutional:  Negative for chills, fever, unexpected weight gain and unexpected weight loss.   HENT:  Negative for congestion, postnasal drip and rhinorrhea.    Eyes:  Negative for blurred vision.   Respiratory:  Negative for shortness of breath.    Cardiovascular:  Negative for leg swelling.   Gastrointestinal:  Negative for abdominal pain, nausea and vomiting.   Genitourinary:  Negative for difficulty urinating.   Musculoskeletal:  Positive for arthralgias. Negative for gait problem, joint swelling and myalgias.   Skin:  Negative for skin lesions and wound.   Neurological:  Negative for dizziness, weakness, light-headedness and numbness.   Hematological:  Does not bruise/bleed easily.   Psychiatric/Behavioral:  Negative for depressed mood.         I have reviewed the  following portions of the patient's history:History of Present Illness and review of systems.    Past Medical History:   Past Medical History:   Diagnosis Date    Allergic rhinitis     Arthritis of back     Bladder disorder     Body mass index (BMI) of 40.0 to 44.9 in adult     CAD (coronary artery disease)     Chest pain     Chronic bilateral low back pain with sciatica     Chronic diastolic heart failure     Chronic pain disorder     Chronic systolic (congestive) heart failure     Colon polyp     Complex dyslipidemia     Coronary artery disease involving native coronary artery of native heart without angina pectoris     Depressive disorder     Diastolic heart failure, stage B     Dyspnea     Essential hypertension     Gait abnormality     GERD without esophagitis     Gout     Heart disease     Hip arthrosis     History of colonic polyps     Hyperglycemia     Hypokalemia     Idiopathic 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 tophus     Increased frequency of urination     Joint pain     Knee pain     LAD (linear IgA dermatosis)     Left ventricular diastolic dysfunction     Long term (current) use of opiate analgesic     Low back pain at multiple sites     Lumbar radiculitis     Mixed hyperlipidemia     Morbid obesity     Multiple vessel coronary artery disease     Myocardial infarction     Osteoarthritis of knee     Other chronic pain     Overactive bladder     Pure hypercholesterolemia     Rheumatoid arthritis     Seasonal allergies     SOB (shortness of breath)     Spinal stenosis     Urinary tract infection     Vitamin B12 deficiency     Vitamin D deficiency        Past Surgical History:   Past Surgical History:   Procedure Laterality Date    BACK SURGERY      CARDIAC CATHETERIZATION      CARDIAC CATHETERIZATION N/A 06/04/2024    Procedure: Left Heart Cath;  Surgeon: Joe Wagner MD;  Location: [facility] CATH INVASIVE LOCATION;  Service: Cardiovascular;  Laterality: N/A;    COLONOSCOPY      CORONARY STENT  PLACEMENT      JOINT REPLACEMENT Bilateral     left  >20 years   Right 10 years ago    KNEE SURGERY      LUMBAR FUSION      ORTHOPEDIC SURGERY      OTHER SURGICAL HISTORY      RESECTION OF SWEAT GLAND AND ACELOUS SKIN    SPINE SURGERY      TUBAL ABDOMINAL LIGATION Bilateral        Family History:   Family History   Problem Relation Age of Onset    Colon cancer Mother     Stroke Father     Hypertension Sister     Kidney disease Sister     Cardiomyopathy Brother     Stroke Other     Diabetes type II Daughter     Hypertension Daughter     Diabetes type II Son     Hypertension Son        Social History:   Social History     Socioeconomic History    Marital status:    Tobacco Use    Smoking status: Never    Smokeless tobacco: Never   Vaping Use    Vaping status: Never Used   Substance and Sexual Activity    Alcohol use: Yes     Comment: SOCIALLY- monthly    Drug use: Never    Sexual activity: Defer       Medications:   Current Outpatient Medications:     allopurinol (ZYLOPRIM) 300 MG tablet, Take 1 tablet by mouth Daily. For gout prevention, Disp: 90 tablet, Rfl: 1    aspirin 81 MG EC tablet, Take 1 tablet by mouth Daily., Disp: 90 tablet, Rfl: 1    atorvastatin (LIPITOR) 80 MG tablet, Take 1 tablet by mouth Daily., Disp: 90 tablet, Rfl: 1    benzonatate (TESSALON) 200 MG capsule, Take 1 capsule by mouth 3 (Three) Times a Day As Needed for Cough. (Patient taking differently: Take 1 capsule by mouth As Needed for Cough.), Disp: 30 capsule, Rfl: 3    Buprenorphine (Butrans) 10 MCG/HR patch weekly, Place 1 patch on the skin as directed by provider Every 7 (Seven) Days., Disp: 4 patch, Rfl: 0    buPROPion XL (Wellbutrin XL) 150 MG 24 hr tablet, Take 1 tablet by mouth Daily. For appetite and mood, Disp: 90 tablet, Rfl: 1    carvedilol (COREG) 25 MG tablet, TAKE 2 TABLETS BY MOUTH TWICE DAILY WITH MEALS, Disp: 360 tablet, Rfl: 0    Cholecalciferol (Vitamin D) 50 MCG (2000 UT) capsule, Take 2 caps po daily for vit D,  Disp: 180 capsule, Rfl: 1    clopidogrel (PLAVIX) 75 MG tablet, Take 1 tablet by mouth Daily., Disp: 90 tablet, Rfl: 1    Evolocumab (REPATHA) solution auto-injector SureClick injection, Inject 1 mL under the skin into the appropriate area as directed Every 14 (Fourteen) Days., Disp: 6 mL, Rfl: 3    ezetimibe (ZETIA) 10 MG tablet, Take 1 tablet by mouth Daily., Disp: 90 tablet, Rfl: 1    Farxiga 10 MG tablet, Take 10 mg by mouth Daily., Disp: 90 tablet, Rfl: 1    fesoterodine fumarate (TOVIAZ ER) 8 MG tablet sustained-release 24 hour tablet, Take 1 tablet by mouth Daily., Disp: , Rfl:     nitroglycerin (NITROSTAT) 0.4 MG SL tablet, Place 1 tablet under the tongue Every 5 (Five) Minutes As Needed for Chest Pain. Take no more than 3 doses in 15 minutes., Disp: 30 tablet, Rfl: 3    pantoprazole (PROTONIX) 40 MG EC tablet, Take 1 tablet by mouth 2 (Two) Times a Day. For acid reflux, Disp: 180 tablet, Rfl: 1    polyethylene glycol (MIRALAX) 17 GM/SCOOP powder, Take 17 g by mouth Daily., Disp: , Rfl:     promethazine (PHENERGAN) 25 MG tablet, TAKE 1/2 TO 1 (ONE-HALF TO ONE) TABLET BY MOUTH EVERY 4 TO 6 HOURS AS NEEDED FOR NAUSEA CAUTION SEDATION, Disp: , Rfl:     sertraline (ZOLOFT) 50 MG tablet, Take 1 tablet by mouth Daily. For mood, Disp: 90 tablet, Rfl: 1    spironolactone (ALDACTONE) 25 MG tablet, Take 1 tablet by mouth Daily., Disp: 90 tablet, Rfl: 1    torsemide (DEMADEX) 20 MG tablet, Take 1 tablet by mouth Daily., Disp: 90 tablet, Rfl: 1    Allergies:   Allergies   Allergen Reactions    Codeine Unknown - High Severity     Pt had surgery and doctor believes she might be allergic to it. Patient is not sure what it does.    Nabumetone Unknown - High Severity    Gabapentin Hallucinations       I reviewed the patient's chief complaint, history of present illness, review of systems, past medical history, surgical history, family history, social history, medications and allergy list.     Objective    Vital Signs:  "  Vitals:    07/01/25 1011   BP: 124/86   Weight: 118 kg (261 lb)   Height: 170.2 cm (67\")     Body mass index is 40.88 kg/m².   Class 3 Severe Obesity (BMI >=40). Obesity-related health conditions include the following: hypertension, coronary heart disease, diabetes mellitus, and impaired fasting glucose. Obesity is newly identified. BMI is is above average; BMI management plan is completed. We discussed portion control and increasing exercise.      Patient reports that she is a non-smoker and has not ever been a smoker.  This behavior was applauded and she was encouraged to continue in smoking cessation.  We will continue to monitor at subsequent visits.    Ortho Exam:  Gait and Station: Appearance: normal gait, no limp, and ambulating with no assistive devices.   Cardiovascular System: Arterial Pulses Right: dorsalis pedis pulse normal and posterior tibialis pulse normal. Arterial Pulses Left: dorsalis pedis pulse normal and posterior tibialis pulse normal. Edema Right: none. Edema Left: none.   Lumbar Spine: Inspection: normal alignment. Bony Palpation: no tenderness of the spinous process, the transverse process, the paraspinals, or the coccyx and tenderness of the sacrum. Special Tests: seated straight leg raising test positive.   Hip/Pelvis Appearance: Inspection: normal axial alignment.   Hips: Bony Palpation Left: no tenderness of the iliac crest, the ASIS, the PSIS, the pubic tubercle, the sciatic notch, the ischial tuberosity, the SI joint, or the greater trochanter. Soft Tissue Palpation Left: no tenderness of the hip flexor muscles or the hip adductor muscles and tenderness of the biceps femoris muscle, the semitendinous muscle, the semimembranous muscle, and the piriformis. Active Range of Motion Left: normal, flexion normal, extension normal, internal rotation normal, and external rotation normal. Passive Range of Motion Left: no flexion contracture, hamstring tightness popliteal angle, or pain with " motion and normal, flexion normal, extension normal, internal rotation normal, and external rotation normal. Special Tests Left: Rodríguez-Fabere test positive; negative FADIR, axial load. Strength Left: normal 5/5.   Skin: Left Lower Extremity: normal.   Neurologic: Sensation on the Left: T12 normal, L1 normal, L2 normal, L3 normal, L4 normal, S2 normal, and S3,4,5 normal.    Results Review:   Imaging Results (Last 24 Hours)       Procedure Component Value Units Date/Time    XR Hip With or Without Pelvis 2 - 3 View Left - In process [[id number]] Resulted: 07/01/25 1002     Updated: 07/01/25 1011    This result has not been signed. Information might be incomplete.          I personally viewed and interpreted radiographs of the left hip from 7/1/2025.  No acute fracture or dislocation.  Degenerative changes noted in the hip joint.    Procedures    Assessment / Plan    Assessment/Plan:   Diagnoses and all orders for this visit:    1. Left hip pain (Primary)  -     XR Hip With or Without Pelvis 2 - 3 View Left    2. Primary osteoarthritis of left hip    3. Status post bilateral knee replacements    Plan:  No significant intra-articular hip pain despite radiographs showing hip arthritis.  Pain felt not likely to be coming from the hip.  Does have bilateral knee pain that she complains of most today.  Has previously been checked out by a joint replacement specialist given her history of total knee arthroplasty.  Recommendations today include continuing with lumbar spine MRI and recommendations as per Dr. Win.  If knee pain continues, can be reevaluated by joint replacement specialist given 2025-year since surgery.  Follow-up with me as needed.    Previous studies reviewed: Radiographs left hip 7/1/2025.  Radiographs bilateral knees 7/24/2024.  BMP 4/28/2025.    Follow Up:   Return if symptoms worsen or fail to improve.      Christiano Hay MD  [facility] [place] [facility]

## 2025-07-03 LAB
ALBUMIN SERPL-MCNC: 3.9 G/DL (ref 3.5–5.2)
ALBUMIN/GLOB SERPL: 1.4 G/DL
ALP SERPL-CCNC: 129 U/L (ref 39–117)
ALT SERPL-CCNC: 15 U/L (ref 1–33)
AST SERPL-CCNC: 21 U/L (ref 1–32)
BASOPHILS # BLD AUTO: 0.05 10*3/MM3 (ref 0–0.2)
BASOPHILS NFR BLD AUTO: 0.6 % (ref 0–1.5)
BILIRUB SERPL-MCNC: 0.7 MG/DL (ref 0–1.2)
BUN SERPL-MCNC: 14 MG/DL (ref 8–23)
BUN/CREAT SERPL: 11.9 (ref 7–25)
CALCIUM SERPL-MCNC: 9 MG/DL (ref 8.6–10.5)
CHLORIDE SERPL-SCNC: 102 MMOL/L (ref 98–107)
CHOLEST SERPL-MCNC: 128 MG/DL (ref 0–200)
CK SERPL-CCNC: 89 U/L (ref 20–180)
CO2 SERPL-SCNC: 23 MMOL/L (ref 22–29)
CREAT SERPL-MCNC: 1.18 MG/DL (ref 0.57–1)
EGFRCR SERPLBLD CKD-EPI 2021: 48.9 ML/MIN/1.73
EOSINOPHIL # BLD AUTO: 0.12 10*3/MM3 (ref 0–0.4)
EOSINOPHIL NFR BLD AUTO: 1.5 % (ref 0.3–6.2)
ERYTHROCYTE [DISTWIDTH] IN BLOOD BY AUTOMATED COUNT: 13 % (ref 12.3–15.4)
GLOBULIN SER CALC-MCNC: 2.7 GM/DL
GLUCOSE SERPL-MCNC: 95 MG/DL (ref 65–99)
HBA1C MFR BLD: 5.6 % (ref 4.8–5.6)
HCT VFR BLD AUTO: 47.1 % (ref 34–46.6)
HDLC SERPL-MCNC: 62 MG/DL (ref 40–60)
HGB BLD-MCNC: 15.6 G/DL (ref 12–15.9)
IMM GRANULOCYTES # BLD AUTO: 0.03 10*3/MM3 (ref 0–0.05)
IMM GRANULOCYTES NFR BLD AUTO: 0.4 % (ref 0–0.5)
LDLC SERPL CALC-MCNC: 51 MG/DL (ref 0–100)
LYMPHOCYTES # BLD AUTO: 2.28 10*3/MM3 (ref 0.7–3.1)
LYMPHOCYTES NFR BLD AUTO: 29.4 % (ref 19.6–45.3)
MAGNESIUM SERPL-MCNC: 1.8 MG/DL (ref 1.6–2.4)
MCH RBC QN AUTO: 31.1 PG (ref 26.6–33)
MCHC RBC AUTO-ENTMCNC: 33.1 G/DL (ref 31.5–35.7)
MCV RBC AUTO: 94 FL (ref 79–97)
MONOCYTES # BLD AUTO: 0.62 10*3/MM3 (ref 0.1–0.9)
MONOCYTES NFR BLD AUTO: 8 % (ref 5–12)
NEUTROPHILS # BLD AUTO: 4.66 10*3/MM3 (ref 1.7–7)
NEUTROPHILS NFR BLD AUTO: 60.1 % (ref 42.7–76)
NRBC BLD AUTO-RTO: 0 /100 WBC (ref 0–0.2)
PLATELET # BLD AUTO: 212 10*3/MM3 (ref 140–450)
POTASSIUM SERPL-SCNC: 4.2 MMOL/L (ref 3.5–5.2)
PROT SERPL-MCNC: 6.6 G/DL (ref 6–8.5)
RBC # BLD AUTO: 5.01 10*6/MM3 (ref 3.77–5.28)
SODIUM SERPL-SCNC: 138 MMOL/L (ref 136–145)
T4 FREE SERPL-MCNC: 1.26 NG/DL (ref 0.92–1.68)
TRIGL SERPL-MCNC: 77 MG/DL (ref 0–150)
TSH SERPL DL<=0.005 MIU/L-ACNC: 0.93 UIU/ML (ref 0.27–4.2)
URATE SERPL-MCNC: 3.2 MG/DL (ref 2.4–5.7)
VLDLC SERPL CALC-MCNC: 15 MG/DL (ref 5–40)
WBC # BLD AUTO: 7.76 10*3/MM3 (ref 3.4–10.8)

## 2025-07-07 ENCOUNTER — TELEPHONE (OUTPATIENT)
Dept: PAIN MEDICINE | Facility: CLINIC | Age: 75
End: 2025-07-07
Payer: MEDICARE

## 2025-07-07 NOTE — TELEPHONE ENCOUNTER
Caller: Sabina Harkins    Relationship: Self    Best call back number: [phone]     What is the best time to reach you: ANYTIME    Who are you requesting to speak with (clinical staff, provider,  specific staff member): CLINICAL    What was the call regarding: PATIENT CALLING TO INFORM MRI LUMBAR SPINE WO CONTRAST  IS SCHEDULED FOR 7/9/25 AT [facility] AND OPEN MRI

## 2025-07-07 NOTE — TELEPHONE ENCOUNTER
Called patient to discuss upcoming lumbar MRI.  She has previously undergone peripheral nerve stimulator with [employer].  Unfortunately when these leads were removed she did suffer from a lead fracture with a retained fragment.  She has been given appropriate information regarding MRI compatibility.  She has already informed the facility where she is receiving her MRI about this and has the appropriate documentation supporting MRI compatibility.  No new questions or complaints.

## 2025-07-16 ENCOUNTER — OFFICE VISIT (OUTPATIENT)
Dept: PAIN MEDICINE | Facility: CLINIC | Age: 75
End: 2025-07-16
Payer: MEDICARE

## 2025-07-16 ENCOUNTER — LAB (OUTPATIENT)
Dept: LAB | Facility: HOSPITAL | Age: 75
End: 2025-07-16
Payer: MEDICARE

## 2025-07-16 VITALS — WEIGHT: 254.7 LBS | BODY MASS INDEX: 39.98 KG/M2 | HEIGHT: 67 IN

## 2025-07-16 DIAGNOSIS — G89.4 CHRONIC PAIN SYNDROME: ICD-10-CM

## 2025-07-16 DIAGNOSIS — M54.16 LUMBAR RADICULOPATHY: Primary | ICD-10-CM

## 2025-07-16 DIAGNOSIS — Z51.81 THERAPEUTIC DRUG MONITORING: ICD-10-CM

## 2025-07-16 DIAGNOSIS — M70.62 GREATER TROCHANTERIC BURSITIS OF LEFT HIP: ICD-10-CM

## 2025-07-16 DIAGNOSIS — M25.562 CHRONIC PAIN OF LEFT KNEE: ICD-10-CM

## 2025-07-16 DIAGNOSIS — M79.2 NEUROPATHIC PAIN: ICD-10-CM

## 2025-07-16 DIAGNOSIS — M79.18 MYOFASCIAL PAIN: ICD-10-CM

## 2025-07-16 DIAGNOSIS — M96.1 POSTLAMINECTOMY SYNDROME: ICD-10-CM

## 2025-07-16 DIAGNOSIS — G89.29 CHRONIC PAIN OF LEFT KNEE: ICD-10-CM

## 2025-07-16 LAB
AMPHET+METHAMPHET UR QL: NEGATIVE
AMPHETAMINES UR QL: NEGATIVE
BARBITURATES UR QL SCN: NEGATIVE
BENZODIAZ UR QL SCN: NEGATIVE
BUPRENORPHINE SERPL-MCNC: NEGATIVE NG/ML
CANNABINOIDS SERPL QL: NEGATIVE
COCAINE UR QL: NEGATIVE
FENTANYL UR-MCNC: NEGATIVE NG/ML
METHADONE UR QL SCN: NEGATIVE
OPIATES UR QL: NEGATIVE
OXYCODONE UR QL SCN: NEGATIVE
PCP UR QL SCN: NEGATIVE
TRICYCLICS UR QL SCN: NEGATIVE

## 2025-07-16 PROCEDURE — 80307 DRUG TEST PRSMV CHEM ANLYZR: CPT

## 2025-07-16 RX ORDER — BUPRENORPHINE 15 UG/H
1 PATCH TRANSDERMAL
Qty: 4 PATCH | Refills: 0 | Status: SHIPPED | OUTPATIENT
Start: 2025-07-16

## 2025-07-16 NOTE — PROGRESS NOTES
Referring Physician: No referring provider defined for this encounter.    Primary Physician: Juan Ramon Valenzuela MD    CHIEF COMPLAINT or REASON FOR VISIT: Med Management and Follow-up      Initial history of present illness on 08/16/2024:  Ms. Sabina Harkins is 75 y.o. female who presents as a new patient referral for treatment of chronic bilateral knee pain.  She has previously undergone bilateral TKA 20 years ago of her left knee and 10 years ago for her right knee.  She had excellent relief from these procedures however she has noticed over the last several years increasing pain especially in her left knee.  She is very sensitive to light touch in the left knee does not wear pants very often due to this issue.  She has been evaluated by orthopedics, Dr. Cartagena, who referred for consideration of nonsurgical management.  She is additionally undergone ongoing evaluation with Dr. Quintero for potential revision however her BMI is 40.88.  She is interested in potential therapies.    Interval history:  Patient returns to clinic today after undergoing a lumbar MRI.  Additionally, she is in the process of uptitrating buprenorphine transdermal patches.  She continues to complain of chronic knee pain as well as pain radiating down her left leg.  Pain will radiate down to the left shin.  No new injuries or events.  Is tolerating Butrans patches without side effect.  She is getting some relief from these patches.        Interventions:  9/10/2024: Left femoral and saphenous nerve PNS 80% relief of medial knee pain  12/5/2024: Left knee genicular nerve block with 100% relief  12/19/2024: Left knee genicular nerve RFA with minimal relief  3/25/2025: Left GTB with minimal relief    Objective Pain Scoring:   BRIEF PAIN INVENTORY:  Total score:   Pain Score    07/16/25 1228   PainSc: 9    PainLoc: Knee        PHQ-2:    PHQ-9:    Opioid Risk Tool:         Review of Systems:   ROS negative except as otherwise noted     Past  Medical History:   Past Medical History:   Diagnosis Date    Allergic rhinitis     Arthritis of back     Bladder disorder     Body mass index (BMI) of 40.0 to 44.9 in adult     CAD (coronary artery disease)     Chest pain     Chronic bilateral low back pain with sciatica     Chronic diastolic heart failure     Chronic pain disorder     Chronic systolic (congestive) heart failure     Colon polyp     Complex dyslipidemia     Coronary artery disease involving native coronary artery of native heart without angina pectoris     Depressive disorder     Diastolic heart failure, stage B     Dyspnea     Essential hypertension     Gait abnormality     GERD without esophagitis     Gout     Heart disease     Hip arthrosis     History of colonic polyps     Hyperglycemia     Hypokalemia     Idiopathic 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 tophus     Increased frequency of urination     Joint pain     Knee pain     LAD (linear IgA dermatosis)     Left ventricular diastolic dysfunction     Long term (current) use of opiate analgesic     Low back pain at multiple sites     Lumbar radiculitis     Mixed hyperlipidemia     Morbid obesity     Multiple vessel coronary artery disease     Myocardial infarction     Osteoarthritis of knee     Other chronic pain     Overactive bladder     Pure hypercholesterolemia     Rheumatoid arthritis     Seasonal allergies     SOB (shortness of breath)     Spinal stenosis     Urinary tract infection     Vitamin B12 deficiency     Vitamin D deficiency          Past Surgical History:   Past Surgical History:   Procedure Laterality Date    BACK SURGERY      CARDIAC CATHETERIZATION      CARDIAC CATHETERIZATION N/A 06/04/2024    Procedure: Left Heart Cath;  Surgeon: Joe Wagner MD;  Location: [facility] CATH INVASIVE LOCATION;  Service: Cardiovascular;  Laterality: N/A;    COLONOSCOPY      CORONARY STENT PLACEMENT      JOINT REPLACEMENT Bilateral     left  >20 years   Right 10 years ago    KNEE SURGERY       LUMBAR FUSION      ORTHOPEDIC SURGERY      OTHER SURGICAL HISTORY      RESECTION OF SWEAT GLAND AND ACELOUS SKIN    SPINE SURGERY      TUBAL ABDOMINAL LIGATION Bilateral          Family History   Family History   Problem Relation Age of Onset    Colon cancer Mother     Stroke Father     Hypertension Sister     Kidney disease Sister     Cardiomyopathy Brother     Stroke Other     Diabetes type II Daughter     Hypertension Daughter     Diabetes type II Son     Hypertension Son          Social History   Social History     Socioeconomic History    Marital status:    Tobacco Use    Smoking status: Never    Smokeless tobacco: Never   Vaping Use    Vaping status: Never Used   Substance and Sexual Activity    Alcohol use: Not Currently     Comment: SOCIALLY- monthly    Drug use: Never    Sexual activity: Defer        Medications:     Current Outpatient Medications:     allopurinol (ZYLOPRIM) 300 MG tablet, Take 1 tablet by mouth Daily. For gout prevention, Disp: 90 tablet, Rfl: 1    aspirin 81 MG EC tablet, Take 1 tablet by mouth Daily., Disp: 90 tablet, Rfl: 1    atorvastatin (LIPITOR) 80 MG tablet, Take 1 tablet by mouth Daily., Disp: 90 tablet, Rfl: 1    benzonatate (TESSALON) 200 MG capsule, Take 1 capsule by mouth 3 (Three) Times a Day As Needed for Cough. (Patient taking differently: Take 1 capsule by mouth As Needed for Cough.), Disp: 30 capsule, Rfl: 3    Buprenorphine (Butrans) 10 MCG/HR patch weekly, Place 1 patch on the skin as directed by provider Every 7 (Seven) Days., Disp: 4 patch, Rfl: 0    buPROPion XL (Wellbutrin XL) 150 MG 24 hr tablet, Take 1 tablet by mouth Daily. For appetite and mood, Disp: 90 tablet, Rfl: 1    carvedilol (COREG) 25 MG tablet, TAKE 2 TABLETS BY MOUTH TWICE DAILY WITH MEALS, Disp: 360 tablet, Rfl: 0    Cholecalciferol (Vitamin D) 50 MCG (2000 UT) capsule, Take 2 caps po daily for vit D, Disp: 180 capsule, Rfl: 1    clopidogrel (PLAVIX) 75 MG tablet, Take 1 tablet by mouth  "Daily., Disp: 90 tablet, Rfl: 1    Evolocumab (REPATHA) solution auto-injector SureClick injection, Inject 1 mL under the skin into the appropriate area as directed Every 14 (Fourteen) Days., Disp: 6 mL, Rfl: 3    ezetimibe (ZETIA) 10 MG tablet, Take 1 tablet by mouth Daily., Disp: 90 tablet, Rfl: 1    Farxiga 10 MG tablet, Take 10 mg by mouth Daily., Disp: 90 tablet, Rfl: 1    fesoterodine fumarate (TOVIAZ ER) 8 MG tablet sustained-release 24 hour tablet, Take 1 tablet by mouth Daily., Disp: , Rfl:     nitroglycerin (NITROSTAT) 0.4 MG SL tablet, Place 1 tablet under the tongue Every 5 (Five) Minutes As Needed for Chest Pain. Take no more than 3 doses in 15 minutes., Disp: 30 tablet, Rfl: 3    pantoprazole (PROTONIX) 40 MG EC tablet, Take 1 tablet by mouth 2 (Two) Times a Day. For acid reflux, Disp: 180 tablet, Rfl: 1    polyethylene glycol (MIRALAX) 17 GM/SCOOP powder, Take 17 g by mouth Daily., Disp: , Rfl:     promethazine (PHENERGAN) 25 MG tablet, TAKE 1/2 TO 1 (ONE-HALF TO ONE) TABLET BY MOUTH EVERY 4 TO 6 HOURS AS NEEDED FOR NAUSEA CAUTION SEDATION, Disp: , Rfl:     sertraline (ZOLOFT) 50 MG tablet, Take 1 tablet by mouth Daily. For mood, Disp: 90 tablet, Rfl: 1    spironolactone (ALDACTONE) 25 MG tablet, Take 1 tablet by mouth Daily., Disp: 90 tablet, Rfl: 1    torsemide (DEMADEX) 20 MG tablet, Take 1 tablet by mouth Daily., Disp: 90 tablet, Rfl: 1        Physical Exam:     Vitals:    07/16/25 1228   Weight: 116 kg (254 lb 11.2 oz)   Height: 170.2 cm (67\")   PainSc: 9    PainLoc: Knee        General: Alert and oriented, No acute distress.   HEENT: Normocephalic, atraumatic.   Cardiovascular: No gross edema, obese  Respiratory: Respirations are non-labored    Sensory Exam: Hypersensitive to light touch in left knee    Neurologic: Cranial Nerves II-XII are grossly intact.   Psychiatric: Cooperative.   Gait: Normal   Assistive Devices: None        Imaging Studies:   No results found for this or any previous " visit.        Independent review of radiographic imaging:   Lumbar MRI dated 7/9/2025 reviewed on 7/16/2025 demonstrates: Previous L4, L5 lumbar fusion; S1 hemangioma; no significant spinal canal stenosis; left L3/4 NFS; facet arthropathy; severe paraspinal multifidus atrophy; degenerative endplate changes at L4, L5    Impression & Plan:       08/16/2024: Sabina Harkins is a 75 y.o. female with past medical history significant for CAD, congestive heart failure, HTN, ERICA, GERD, depression, anxiety, hypokalemia, hyperglycemia, DKA who presents to the pain clinic for evaluation and treatment of chronic left greater than right bilateral knee pain.  Evaluation consistent with chronic left knee pain, chronic and right knee pain.  There are certainly neuropathic qualities to her left knee pain with significant allodynia with light touch.  Able to ascertain skin changes due to dark skin tone.  We discussed potential therapies including genicular nerve ablation, peripheral nerve stimulation.  She would like to proceed with Sprint PNS.  9/24/2024: 2 weeks s/p Sprint PNS for left knee pain.  Doing well thus far.  No new complaints.  Will follow-up for lead pull  11/11/2024: Good relief of medial knee pain from peripheral nerve stimulator with Sprint PNS.  Did experience one lead fracture.  Patient was educated appropriately and provided documentation regarding lead fracture.  Will plan for left genicular nerve blocks for ongoing lateral knee pain.  3/6/2025: Minimal relief from left knee genicular nerve RFA.  Symptoms today consistent with left greater trochanter bursitis, myofascial pain of left thigh.  Will start methocarbamol.  Will plan for left GTB.  5/30/2025: Continued left knee pain.  Will plan on starting Butrans 7.5 mcg/h patches.  There is certainly some type of neuropathic component to her pain.  May contemplate dorsal root ganglion stimulator trial  6/20/2025: Will increase Butrans and order lumbar MRI to assess for  possible radiculopathy  7/16/2025: Increase Butrans to 15 mcg/h.  Obtain compliance UDS.  Will plan for LESI.  May consider SCS.  Lumbar MRI reviewed    1. Lumbar radiculopathy    2. Greater trochanteric bursitis of left hip    3. Myofascial pain    4. Chronic pain of left knee    5. Neuropathic pain    6. Chronic pain syndrome                    PLAN:  1. Medication Recommendations:  - Increase Butrans to 15 mcg/h #4 patches no refills  -Patient was informed to finish all 10 mcg/h patches before starting 15 mcg/h patches  -As part of this patient's treatment plan, patient may be prescribed controlled substances. The patient has been made aware of appropriate use of such medications, including potential risk of somnolence, limited ability to drive and /or work safely, and potential for dependence or overdose. It has also been made clear that these medications are for use by this patient only, without concomitant use of alcohol or other substances unless prescribed. Controlled substance status of medication discussed with patient, discussed risks of medication including abuse potential and diversion potential and need to follow up for reevaluation appointment in order to receive further refills.  Brown was reviewed and compliant.       2. Physical Therapy: Continue HEP    3. Psychological: Consider psych clearance for SCS trial    4. Complementary and alternative (CAM) Therapies:     5. Labs/Diagnostic studies: Obtain compliance UDS    6. Imaging: Lumbar MRI reviewed and interpreted    7. Interventions: Schedule left paramedian lumbar interlaminar epidural steroid injection; possible entry at L3/4.  Will obtain blood thinner clearance to hold Plavix 7 days prior to procedure.  Safe to resume 24 hours after procedure.  I had an in-depth discussion with the patient regarding the risk of procedure including bleeding, infection, damage to surrounding structures, paralysis.  We discussed the potential adverse effects of  corticosteroid injection including flushing of the face, lipodystrophy, skin discoloration, elevated blood glucose, increased blood pressure.  Risks of frequent steroid administration includes weight gain, hormonal changes, mood changes, and osteoporosis.   - We did discuss spinal cord stimulator trial today    8. Referrals: None indicated     9. Records: Brown reviewed and compliant    10. Lifestyle goals:    Follow-up 1 month    Adriana Augustin PA-C  [facility]        Quality Metrics:

## 2025-07-30 ENCOUNTER — TELEPHONE (OUTPATIENT)
Dept: PAIN MEDICINE | Facility: CLINIC | Age: 75
End: 2025-07-30
Payer: MEDICARE

## 2025-07-30 NOTE — TELEPHONE ENCOUNTER
Called patient, informed her that DR Stephens has granted permission for her to hold her Plavix 7 days before her procedure with Dr Win on 8/7/2025. Patient voiced understanding.

## 2025-08-04 ENCOUNTER — OFFICE VISIT (OUTPATIENT)
Dept: FAMILY MEDICINE CLINIC | Facility: CLINIC | Age: 75
End: 2025-08-04
Payer: MEDICARE

## 2025-08-04 VITALS
BODY MASS INDEX: 39.98 KG/M2 | HEART RATE: 88 BPM | WEIGHT: 254.7 LBS | DIASTOLIC BLOOD PRESSURE: 80 MMHG | OXYGEN SATURATION: 97 % | HEIGHT: 67 IN | SYSTOLIC BLOOD PRESSURE: 122 MMHG

## 2025-08-04 DIAGNOSIS — G89.29 BILATERAL CHRONIC KNEE PAIN: ICD-10-CM

## 2025-08-04 DIAGNOSIS — M25.562 BILATERAL CHRONIC KNEE PAIN: ICD-10-CM

## 2025-08-04 DIAGNOSIS — M25.561 BILATERAL CHRONIC KNEE PAIN: ICD-10-CM

## 2025-08-04 DIAGNOSIS — G89.29 CHRONIC BILATERAL LOW BACK PAIN WITHOUT SCIATICA: Primary | Chronic | ICD-10-CM

## 2025-08-04 DIAGNOSIS — M54.50 CHRONIC BILATERAL LOW BACK PAIN WITHOUT SCIATICA: Primary | Chronic | ICD-10-CM

## 2025-08-04 PROCEDURE — 3044F HG A1C LEVEL LT 7.0%: CPT | Performed by: PHYSICIAN ASSISTANT

## 2025-08-04 PROCEDURE — 3079F DIAST BP 80-89 MM HG: CPT | Performed by: PHYSICIAN ASSISTANT

## 2025-08-04 PROCEDURE — 1125F AMNT PAIN NOTED PAIN PRSNT: CPT | Performed by: PHYSICIAN ASSISTANT

## 2025-08-04 PROCEDURE — 99213 OFFICE O/P EST LOW 20 MIN: CPT | Performed by: PHYSICIAN ASSISTANT

## 2025-08-04 PROCEDURE — 3074F SYST BP LT 130 MM HG: CPT | Performed by: PHYSICIAN ASSISTANT

## 2025-08-04 RX ORDER — NITROFURANTOIN 25; 75 MG/1; MG/1
1 CAPSULE ORAL EVERY 12 HOURS SCHEDULED
COMMUNITY
Start: 2025-08-01

## 2025-08-05 DIAGNOSIS — I10 ESSENTIAL HYPERTENSION: Chronic | ICD-10-CM

## 2025-08-05 DIAGNOSIS — I25.10 CORONARY ARTERY DISEASE INVOLVING NATIVE CORONARY ARTERY OF NATIVE HEART WITHOUT ANGINA PECTORIS: Chronic | ICD-10-CM

## 2025-08-05 DIAGNOSIS — I50.32 CHRONIC DIASTOLIC CONGESTIVE HEART FAILURE: Chronic | ICD-10-CM

## 2025-08-05 RX ORDER — CARVEDILOL 25 MG/1
50 TABLET ORAL 2 TIMES DAILY WITH MEALS
Qty: 360 TABLET | Refills: 1 | Status: SHIPPED | OUTPATIENT
Start: 2025-08-05

## 2025-08-07 ENCOUNTER — OUTSIDE FACILITY SERVICE (OUTPATIENT)
Dept: PAIN MEDICINE | Facility: CLINIC | Age: 75
End: 2025-08-07
Payer: MEDICARE

## 2025-08-07 ENCOUNTER — DOCUMENTATION (OUTPATIENT)
Dept: PAIN MEDICINE | Facility: CLINIC | Age: 75
End: 2025-08-07

## 2025-08-07 PROCEDURE — 62323 NJX INTERLAMINAR LMBR/SAC: CPT | Performed by: STUDENT IN AN ORGANIZED HEALTH CARE EDUCATION/TRAINING PROGRAM

## (undated) DEVICE — GLIDESHEATH SLENDER STAINLESS STEEL KIT: Brand: GLIDESHEATH SLENDER

## (undated) DEVICE — MODEL BT2000 P/N 700287-012KIT CONTENTS: MANIFOLD WITH SALINE AND CONTRAST PORTS, SALINE TUBING WITH SPIKE AND HAND SYRINGE, TRANSDUCER: Brand: BT2000 AUTOMATED MANIFOLD KIT

## (undated) DEVICE — ST INF PRI SMRTSTE 20DRP 2VLV 24ML 117

## (undated) DEVICE — ADULT, W/LG. BACK PAD, RADIOTRANSPARENT ELEMENT AND LEAD WIRE COMPATIBLE W/: Brand: DEFIBRILLATION ELECTRODES

## (undated) DEVICE — CVR PROB ULTRASND/TRANSD W/GEL 7X11IN STRL

## (undated) DEVICE — PK CATH CARD 10

## (undated) DEVICE — CATH DIAG EXPO .045 FL3.5 5F 100CM

## (undated) DEVICE — ST EXT IV SMRTSTE 2VLV FIX M LL 6ML 41

## (undated) DEVICE — TR BAND RADIAL ARTERY COMPRESSION DEVICE: Brand: TR BAND

## (undated) DEVICE — GW PERIPH GUIDERIGHT STD/EXCHNG/J/TIP SS 0.035IN 5X260CM

## (undated) DEVICE — CATH DIAG EXPO M/ PK 5F FL4/FR4 PIG

## (undated) DEVICE — MODEL AT P65, P/N 701554-001KIT CONTENTS: HAND CONTROLLER, 3-WAY HIGH-PRESSURE STOPCOCK WITH ROTATING END AND PREMIUM HIGH-PRESSURE TUBING: Brand: ANGIOTOUCH® KIT